# Patient Record
Sex: FEMALE | Race: WHITE | NOT HISPANIC OR LATINO | Employment: UNEMPLOYED | URBAN - METROPOLITAN AREA
[De-identification: names, ages, dates, MRNs, and addresses within clinical notes are randomized per-mention and may not be internally consistent; named-entity substitution may affect disease eponyms.]

---

## 2017-02-10 ENCOUNTER — HOSPITAL ENCOUNTER (EMERGENCY)
Facility: OTHER | Age: 57
Discharge: HOME | End: 2017-02-10
Attending: EMERGENCY MEDICINE

## 2017-02-10 VITALS
RESPIRATION RATE: 18 BRPM | HEART RATE: 70 BPM | DIASTOLIC BLOOD PRESSURE: 80 MMHG | OXYGEN SATURATION: 100 % | WEIGHT: 190 LBS | TEMPERATURE: 98 F | BODY MASS INDEX: 30.67 KG/M2 | SYSTOLIC BLOOD PRESSURE: 130 MMHG

## 2017-02-10 DIAGNOSIS — J32.1 FRONTAL SINUSITIS, UNSPECIFIED CHRONICITY: ICD-10-CM

## 2017-02-10 DIAGNOSIS — J01.00 ACUTE NON-RECURRENT MAXILLARY SINUSITIS: ICD-10-CM

## 2017-02-10 DIAGNOSIS — J06.9 VIRAL UPPER RESPIRATORY TRACT INFECTION: Primary | ICD-10-CM

## 2017-02-10 PROCEDURE — 96372 THER/PROPH/DIAG INJ SC/IM: CPT

## 2017-02-10 PROCEDURE — 63600175 PHARM REV CODE 636 W HCPCS: Performed by: EMERGENCY MEDICINE

## 2017-02-10 PROCEDURE — 99283 EMERGENCY DEPT VISIT LOW MDM: CPT

## 2017-02-10 RX ORDER — PROMETHAZINE HYDROCHLORIDE AND CODEINE PHOSPHATE 6.25; 1 MG/5ML; MG/5ML
5 SOLUTION ORAL EVERY 4 HOURS PRN
Qty: 118 ML | Refills: 0 | Status: SHIPPED | OUTPATIENT
Start: 2017-02-10 | End: 2017-02-10

## 2017-02-10 RX ORDER — AZITHROMYCIN 250 MG/1
TABLET, FILM COATED ORAL
Qty: 6 TABLET | Refills: 0 | Status: SHIPPED | OUTPATIENT
Start: 2017-02-10 | End: 2018-07-20

## 2017-02-10 RX ORDER — PROMETHAZINE HYDROCHLORIDE AND CODEINE PHOSPHATE 6.25; 1 MG/5ML; MG/5ML
5 SOLUTION ORAL EVERY 4 HOURS PRN
Qty: 118 ML | Refills: 0 | Status: SHIPPED | OUTPATIENT
Start: 2017-02-10 | End: 2017-02-20

## 2017-02-10 RX ORDER — DEXAMETHASONE SODIUM PHOSPHATE 4 MG/ML
12 INJECTION, SOLUTION INTRA-ARTICULAR; INTRALESIONAL; INTRAMUSCULAR; INTRAVENOUS; SOFT TISSUE
Status: COMPLETED | OUTPATIENT
Start: 2017-02-10 | End: 2017-02-10

## 2017-02-10 RX ADMIN — DEXAMETHASONE SODIUM PHOSPHATE 12 MG: 4 INJECTION, SOLUTION INTRAMUSCULAR; INTRAVENOUS at 03:02

## 2017-02-10 NOTE — DISCHARGE INSTRUCTIONS
Viral Upper Respiratory Illness (Adult)  You have a viral upper respiratory illness (URI), which is another term for the common cold. This illness is contagious during the first few days. It is spread through the air by coughing and sneezing. It may also be spread by direct contact (touching the sick person and then touching your own eyes, nose, or mouth). Frequent handwashing will decrease risk of spread. Most viral illnesses go away within 7 to 10 days with rest and simple home remedies. Sometimes the illness may last for several weeks. Antibiotics will not kill a virus, and they are generally not prescribed for this condition.    Home care  · If symptoms are severe, rest at home for the first 2 to 3 days. When you resume activity, don't let yourself get too tired.  · Avoid being exposed to cigarette smoke (yours or others).  · You may use acetaminophen or ibuprofen to control pain and fever, unless another medicine was prescribed. (Note: If you have chronic liver or kidney disease, have ever had a stomach ulcer or gastrointestinal bleeding, or are taking blood-thinning medicines, talk with your healthcare provider before using these medicines.) Aspirin should never be given to anyone under 18 years of age who is ill with a viral infection or fever. It may cause severe liver or brain damage.  · Your appetite may be poor, so a light diet is fine. Avoid dehydration by drinking 6 to 8 glasses of fluids per day (water, soft drinks, juices, tea, or soup). Extra fluids will help loosen secretions in the nose and lungs.  · Over-the-counter cold medicines will not shorten the length of time youre sick, but they may be helpful for the following symptoms: cough, sore throat, and nasal and sinus congestion. (Note: Do not use decongestants if you have high blood pressure.)  Follow-up care  Follow up with your healthcare provider, or as advised.  When to seek medical advice  Call your healthcare provider right away if  any of these occur:  · Cough with lots of colored sputum (mucus)  · Severe headache; face, neck, or ear pain  · Difficulty swallowing due to throat pain  · Fever of 100.4°F (38°C)  Call 911, or get immediate medical care  Call emergency services right away if any of these occur:  · Chest pain, shortness of breath, wheezing, or difficulty breathing  · Coughing up blood  · Inability to swallow due to throat pain  Date Last Reviewed: 9/13/2015 © 2000-2016 [employer]. [address] [place] [address]. All rights reserved. This information is not intended as a substitute for professional medical care. Always follow your healthcare professional's instructions.

## 2017-02-10 NOTE — ED AVS SNAPSHOT
[facility] EMERGENCY DEPARTMENT  [address] [place] [address]               Alyce CATES Book   2/10/2017 11:02 AM   ED    Description:  Female : 1960   Department:  [facility] Emergency Department           Your Care was Coordinated By:     Provider Role From To    Haleigh Brower MD Attending Provider 02/10/17 6667 --      Reason for Visit     URI           Diagnoses this Visit        Comments    Viral upper respiratory tract infection    -  Primary     Acute non-recurrent maxillary sinusitis         Frontal sinusitis, unspecified chronicity           ED Disposition     ED Disposition Condition Comment    Discharge             To Do List           Follow-up Information     Follow up with Primary Doctor No In 1 week(s).       These Medications        Disp Refills Start End    promethazine-codeine 6.25-10 mg/5 ml (PHENERGAN WITH CODEINE) 6.25-10 mg/5 mL syrup 118 mL 0 2/10/2017 2017    Take 5 mLs by mouth every 4 (four) hours as needed for Cough. - Oral    azithromycin (ZITHROMAX Z-LATONYA) 250 MG tablet 6 tablet 0 2/10/2017     2 tablets by mouth on day 1, then 1 tablet daily until gone.      [facility] On Call     [facility] On Call Nurse Care Line -  Assistance  Registered nurses in the [facility] On Call Center provide clinical advisement, health education, appointment booking, and other advisory services.  Call for this free service at [phone].             Medications           Message regarding Medications     Verify the changes and/or additions to your medication regime listed below are the same as discussed with your clinician today.  If any of these changes or additions are incorrect, please notify your healthcare provider.        START taking these NEW medications        Refills    promethazine-codeine 6.25-10 mg/5 ml (PHENERGAN WITH CODEINE) 6.25-10 mg/5 mL syrup 0    Sig: Take 5 mLs by mouth every 4 (four) hours as needed for Cough.    Class: Print    Route: Oral     azithromycin (ZITHROMAX Z-LATONYA) 250 MG tablet 0    Si tablets by mouth on day 1, then 1 tablet daily until gone.    Class: Print      These medications were administered today        Dose Freq    dexamethasone injection 12 mg 12 mg ED 1 Time    Sig: Inject 3 mLs (12 mg total) into the muscle ED 1 Time.    Class: Normal    Route: Intramuscular      STOP taking these medications     hydrocodone-acetaminophen 5-325mg (NORCO) 5-325 mg per tablet Take 1 tablet by mouth 4 (four) times daily as needed for Pain.           Verify that the below list of medications is an accurate representation of the medications you are currently taking.  If none reported, the list may be blank. If incorrect, please contact your healthcare provider. Carry this list with you in case of emergency.           Current Medications     azithromycin (ZITHROMAX Z-LATONYA) 250 MG tablet 2 tablets by mouth on day 1, then 1 tablet daily until gone.    gabapentin (NEURONTIN) 300 MG capsule Take 300 mg by mouth 3 (three) times daily.    ibuprofen (ADVIL,MOTRIN) 800 MG tablet Take 1 tablet (800 mg total) by mouth 3 (three) times daily as needed for Pain.    promethazine-codeine 6.25-10 mg/5 ml (PHENERGAN WITH CODEINE) 6.25-10 mg/5 mL syrup Take 5 mLs by mouth every 4 (four) hours as needed for Cough.           Clinical Reference Information           Your Vitals Were     BP Pulse Temp Resp Weight SpO2    122/87 111 98.1 °F (36.7 °C) (Temporal) 20 86.2 kg (190 lb) 97%    BMI                30.67 kg/m2          Allergies as of 2/10/2017     No Known Allergies      Immunizations Administered on Date of Encounter - 2/10/2017     None      ED Micro, Lab, POCT     None      ED Imaging Orders     None        Discharge Instructions           Viral Upper Respiratory Illness (Adult)  You have a viral upper respiratory illness (URI), which is another term for the common cold. This illness is contagious during the first few days. It is spread through the air by  coughing and sneezing. It may also be spread by direct contact (touching the sick person and then touching your own eyes, nose, or mouth). Frequent handwashing will decrease risk of spread. Most viral illnesses go away within 7 to 10 days with rest and simple home remedies. Sometimes the illness may last for several weeks. Antibiotics will not kill a virus, and they are generally not prescribed for this condition.    Home care  · If symptoms are severe, rest at home for the first 2 to 3 days. When you resume activity, don't let yourself get too tired.  · Avoid being exposed to cigarette smoke (yours or others).  · You may use acetaminophen or ibuprofen to control pain and fever, unless another medicine was prescribed. (Note: If you have chronic liver or kidney disease, have ever had a stomach ulcer or gastrointestinal bleeding, or are taking blood-thinning medicines, talk with your healthcare provider before using these medicines.) Aspirin should never be given to anyone under 18 years of age who is ill with a viral infection or fever. It may cause severe liver or brain damage.  · Your appetite may be poor, so a light diet is fine. Avoid dehydration by drinking 6 to 8 glasses of fluids per day (water, soft drinks, juices, tea, or soup). Extra fluids will help loosen secretions in the nose and lungs.  · Over-the-counter cold medicines will not shorten the length of time youre sick, but they may be helpful for the following symptoms: cough, sore throat, and nasal and sinus congestion. (Note: Do not use decongestants if you have high blood pressure.)  Follow-up care  Follow up with your healthcare provider, or as advised.  When to seek medical advice  Call your healthcare provider right away if any of these occur:  · Cough with lots of colored sputum (mucus)  · Severe headache; face, neck, or ear pain  · Difficulty swallowing due to throat pain  · Fever of 100.4°F (38°C)  Call 911, or get immediate medical care  Call  emergency services right away if any of these occur:  · Chest pain, shortness of breath, wheezing, or difficulty breathing  · Coughing up blood  · Inability to swallow due to throat pain  Date Last Reviewed: 9/13/2015  © 6999-9400 [employer]. [address] [place] [address], [place] [address]. All rights reserved. This information is not intended as a substitute for professional medical care. Always follow your healthcare professional's instructions.          [facility] Sign-Up     Activating your [facility] account is as easy as 1-2-3!     1) Visit [email or web address], select Sign Up Now, enter this activation code and your date of birth, then select Next.  [email or web address]  Expires: 3/27/2017  3:37 PM      2) Create a username and password to use when you visit [facility] in the future and select a security question in case you lose your password and select Next.    3) Enter your e-mail address and click Sign Up!    Additional Information  If you have questions, please e-mail [email or web address] ([email or web address]) or call [phone] to talk to our [facility] staff. Remember, [facility] is NOT to be used for urgent needs. For medical emergencies, dial 911.          [facility] Emergency Department complies with applicable Federal civil rights laws and does not discriminate on the basis of race, color, national origin, age, disability, or sex.        Language Assistance Services     ATTENTION: Language assistance services are available, free of charge. Please call [phone].      ATENCIÓN: Si habla español, tiene a gray disposición servicios gratuitos de asistencia lingüística. Llame al [phone].     CHÚ Ý: N?u b?n nói Ti?ng Vi?t, có các d?ch v? h? tr? ngôn ng? mi?n phí dành cho b?n. G?i s? [phone].

## 2017-02-10 NOTE — ED NOTES
Appearance: Pt awake, alert & oriented to person, place & time. Pt in no acute distress at present time.  Skin: Skin warm, dry & intact. Mucous membranes moist. Skin turgor normal.  Respiratory: Respirations even, non-labored. + dry coughing, expiratory wheezing noted to the left side   Neurologic: Pt moving all extremities without difficulty. Sensation intact.   Peripheral Vascular: All peripheral pulses present.

## 2017-02-10 NOTE — ED TRIAGE NOTES
Pt reports had heart cath where an ablation was done, at [facility] last week ago, pt reports has been having sore throat, cough, dry, and chest congestion since Friday, + loss of voice noted.

## 2017-02-11 NOTE — ED PROVIDER NOTES
Encounter Date: 2/10/2017       History     Chief Complaint   Patient presents with    URI     sore throat cough      Review of patient's allergies indicates:  No Known Allergies  HPI Comments: 56-year-old female reports runny nose, cough productive of sputum, sore throat, hoarseness of voice, does nasal drip, and sinus pressure for greater than 1 week.  Denies fever    The history is provided by the patient.     Past Medical History   Diagnosis Date    Hyperlipemia      No past medical history pertinent negatives.  Past Surgical History   Procedure Laterality Date    Hysterectomy      Appendectomy       History reviewed. No pertinent family history.  Social History   Substance Use Topics    Smoking status: Never Smoker    Smokeless tobacco: None    Alcohol use No     Review of Systems   Constitutional: Negative for chills and fever.   HENT: Positive for congestion, postnasal drip, rhinorrhea, sinus pressure and sore throat. Negative for trouble swallowing.    Respiratory: Positive for cough. Negative for shortness of breath.    Cardiovascular: Positive for chest pain (with cough). Negative for palpitations.   Gastrointestinal: Positive for nausea and vomiting (posttussive). Negative for abdominal pain.   Skin: Negative for color change and wound.       Physical Exam   Initial Vitals   BP Pulse Resp Temp SpO2   02/10/17 1104 02/10/17 1104 02/10/17 1104 02/10/17 1104 02/10/17 1104   125/73 96 18 98.1 °F (36.7 °C) 98 %     Physical Exam    Nursing note and vitals reviewed.  Constitutional: Vital signs are normal. She appears well-developed and well-nourished. She is cooperative.   HENT:   Head: Normocephalic and atraumatic.   Nose: Mucosal edema and rhinorrhea present.   Mouth/Throat: Uvula is midline and oropharynx is clear and moist.   Neck:   moderate hoarseness of voice noted    Cardiovascular: Normal rate, regular rhythm and normal heart sounds.   Pulses:       Radial pulses are 2+ on the right side, and  2+ on the left side.   Pulmonary/Chest: Effort normal and breath sounds normal. She has no wheezes.   Neurological: She is alert and oriented to person, place, and time. Gait normal.   Skin: Skin is warm and dry.         ED Course   Procedures  Labs Reviewed - No data to display         Patient was given Decadron 12 mg IM in the ED.                   ED Course     Clinical Impression:   The primary encounter diagnosis was Viral upper respiratory tract infection. Diagnoses of Acute non-recurrent maxillary sinusitis and Frontal sinusitis, unspecified chronicity were also pertinent to this visit.    Disposition:   Disposition: Discharged  Condition: Stable       Haleigh Brower MD  02/10/17 1956

## 2017-06-14 LAB
CHOL/HDLC RATIO: 4.2
CHOLESTEROL, TOTAL: 184
HDLC SERPL-MCNC: 44 MG/DL
LDLC SERPL CALC-MCNC: 116 MG/DL
TRIGL SERPL-MCNC: 120 MG/DL

## 2018-01-10 LAB — HIV: NEGATIVE

## 2018-07-20 ENCOUNTER — OFFICE VISIT (OUTPATIENT)
Dept: FAMILY MEDICINE | Facility: CLINIC | Age: 58
End: 2018-07-20
Payer: COMMERCIAL

## 2018-07-20 ENCOUNTER — TELEPHONE (OUTPATIENT)
Dept: FAMILY MEDICINE | Facility: CLINIC | Age: 58
End: 2018-07-20

## 2018-07-20 VITALS
BODY MASS INDEX: 31.82 KG/M2 | TEMPERATURE: 99 F | OXYGEN SATURATION: 96 % | HEIGHT: 66 IN | HEART RATE: 100 BPM | DIASTOLIC BLOOD PRESSURE: 80 MMHG | RESPIRATION RATE: 16 BRPM | WEIGHT: 198 LBS | SYSTOLIC BLOOD PRESSURE: 136 MMHG

## 2018-07-20 DIAGNOSIS — F41.9 ANXIETY: ICD-10-CM

## 2018-07-20 DIAGNOSIS — M25.562 ACUTE PAIN OF LEFT KNEE: Primary | ICD-10-CM

## 2018-07-20 DIAGNOSIS — I10 HYPERTENSION, WELL CONTROLLED: ICD-10-CM

## 2018-07-20 DIAGNOSIS — G56.02 LEFT CARPAL TUNNEL SYNDROME: ICD-10-CM

## 2018-07-20 PROCEDURE — 99999 PR PBB SHADOW E&M-EST. PATIENT-LVL III: CPT | Mod: PBBFAC,,, | Performed by: FAMILY MEDICINE

## 2018-07-20 PROCEDURE — 99204 OFFICE O/P NEW MOD 45 MIN: CPT | Mod: S$GLB,,, | Performed by: FAMILY MEDICINE

## 2018-07-20 PROCEDURE — 3008F BODY MASS INDEX DOCD: CPT | Mod: CPTII,S$GLB,, | Performed by: FAMILY MEDICINE

## 2018-07-20 RX ORDER — FLUOXETINE HYDROCHLORIDE 20 MG/1
20 CAPSULE ORAL EVERY MORNING
Qty: 30 CAPSULE | Refills: 2 | Status: SHIPPED | OUTPATIENT
Start: 2018-07-20 | End: 2018-08-20 | Stop reason: SDUPTHER

## 2018-07-20 RX ORDER — HYDROCHLOROTHIAZIDE 12.5 MG/1
12.5 TABLET ORAL DAILY
COMMUNITY
End: 2018-08-17 | Stop reason: SDUPTHER

## 2018-07-20 RX ORDER — GLUCOSAMINE/CHONDRO SU A 500-400 MG
1 TABLET ORAL DAILY
COMMUNITY
End: 2019-02-04

## 2018-07-20 RX ORDER — METHOCARBAMOL 500 MG/1
500 TABLET, FILM COATED ORAL
COMMUNITY
End: 2018-08-17 | Stop reason: SDUPTHER

## 2018-07-20 RX ORDER — FLUTICASONE PROPIONATE 50 MCG
2 SPRAY, SUSPENSION (ML) NASAL DAILY
COMMUNITY

## 2018-07-20 RX ORDER — MELOXICAM 15 MG/1
15 TABLET ORAL DAILY
Qty: 30 TABLET | Refills: 0 | Status: SHIPPED | OUTPATIENT
Start: 2018-07-20 | End: 2018-08-20 | Stop reason: SDUPTHER

## 2018-07-20 RX ORDER — AMITRIPTYLINE HYDROCHLORIDE 50 MG/1
50 TABLET, FILM COATED ORAL
COMMUNITY
End: 2018-08-17 | Stop reason: SDUPTHER

## 2018-07-20 RX ORDER — METOPROLOL TARTRATE 25 MG/1
25 TABLET, FILM COATED ORAL
COMMUNITY
End: 2018-11-20 | Stop reason: SDUPTHER

## 2018-07-20 NOTE — TELEPHONE ENCOUNTER
Pt seen today, needs return to work note, she is off on weekends, return on Monday; informed pt letter will be at

## 2018-07-20 NOTE — TELEPHONE ENCOUNTER
----- Message from Latasha Mix sent at 7/20/2018  2:57 PM CDT -----  Contact: self  Patient had an appointment today at 2:00 pm today and would like to get a doctors excuse. She said she can come in and pick it up later today. Please call back at [phone]

## 2018-07-20 NOTE — PROGRESS NOTES
Subjective:       Patient ID: Alyce Zurita is a 58 y.o. female.    Chief Complaint: Fluid Retention (follow up hctz est care )    HPI    Pt is here today to establish care    SVT s/p ablation ~ 2 years ago. Pt is sxs free since then, but her HR remains around 100-120bpm.    Htn - Chronic - stable on current medications.     AR - chronic - on flonase works well.    Sleep disturbance - a/w anxiety - pt has had chronic anxiety  That has been ongoing for years, and never had this evaluted.     R knee pain - Onset 1 month ago of tripping and falling over her small dog and landing on her L     L carpal tunnel - a/w tingling, pain and numbness x 1 year that is intermittent. A/w weakness. L hand dominant.     Anxiety - pt has chronic anxiety for years, which has never been assessed. She is interested in seeking out medication    She has tried CBT many years ago which was unsuccessful.        Outpatient Prescriptions Marked as Taking for the 7/20/18 encounter (Office Visit) with Bryson Davila MD   Medication Sig Dispense Refill    amitriptyline (ELAVIL) 50 MG tablet Take 50 mg by mouth. Take 3 tablets nightly      cholecalciferol, vitamin D3, (VITAMIN D3 ORAL) Take by mouth 2 (two) times daily.      esomeprazole magnesium (NEXIUM ORAL) Take by mouth once daily.      fluticasone (FLONASE) 50 mcg/actuation nasal spray 2 sprays by Each Nare route once daily.      gabapentin (NEURONTIN) 300 MG capsule Take 300 mg by mouth. Take 2 tablet in the morning and 2 tablet nightly      glucosamine-chondroitin 500-400 mg tablet Take 1 tablet by mouth once daily.      HYDROCHLOROTHIAZIDE ORAL Take 12.5 mg by mouth once daily.      methocarbamol (ROBAXIN) 500 MG Tab Take 500 mg by mouth. Take 3 tablet nightly      metoprolol tartrate (LOPRESSOR) 25 MG tablet Take 25 mg by mouth. Take 1/2 tablet daily      POTASSIUM ORAL Take 20 mg by mouth as needed.      [DISCONTINUED] ibuprofen (ADVIL,MOTRIN) 800 MG tablet Take 1 tablet  (800 mg total) by mouth 3 (three) times daily as needed for Pain. (Patient taking differently: Take 800 mg by mouth as needed for Pain. ) 20 tablet 0       Past Medical History:   Diagnosis Date    Allergy     Arthritis     GERD (gastroesophageal reflux disease)     Hyperlipemia     Kidney problem     Meningitis     3 months old    Tachycardia        Family History   Problem Relation Age of Onset    Arthritis Mother     Glaucoma Mother     Asthma Daughter         reports that she has never smoked. She has never used smokeless tobacco. She reports that she does not drink alcohol.    Review of Systems   Constitutional: Negative for chills and fever.   HENT: Negative for congestion, ear pain, hearing loss, rhinorrhea and sore throat.    Eyes: Negative for pain and discharge.   Respiratory: Negative for cough and shortness of breath.    Cardiovascular: Negative for chest pain and palpitations.   Gastrointestinal: Negative for diarrhea, nausea and vomiting.   Genitourinary: Negative for difficulty urinating, dysuria and frequency.   Musculoskeletal: Positive for arthralgias and joint swelling.   Allergic/Immunologic: Negative for environmental allergies and food allergies.   Neurological: Positive for weakness and numbness. Negative for seizures.   Psychiatric/Behavioral: Negative for dysphoric mood, self-injury and suicidal ideas. The patient is nervous/anxious.        Objective:     Vitals:    07/20/18 1345   BP: 136/80   Pulse: 100   Resp: 16   Temp: 98.6 °F (37 °C)        Physical Exam   Constitutional: She appears well-developed. No distress.   HENT:   Head: Normocephalic and atraumatic.   Eyes: Conjunctivae are normal. No scleral icterus.   Pulmonary/Chest: Effort normal.   Musculoskeletal:        Right knee: She exhibits normal range of motion, no swelling, no effusion, no ecchymosis and no deformity.        Left knee: She exhibits decreased range of motion, swelling and effusion. She exhibits no  ecchymosis, no deformity and no laceration. Tenderness found.   + Phalen's   Neurological: She is alert.   Skin: She is not diaphoretic.   Psychiatric: Her behavior is normal. Thought content normal. Her mood appears anxious. Her speech is rapid and/or pressured. Cognition and memory are normal.   Vitals reviewed.      Assessment:       1. Acute pain of left knee    2. Left carpal tunnel syndrome    3. Hypertension, well controlled    4. Anxiety        Plan:       Alyce was seen today for fluid retention.    Diagnoses and all orders for this visit:    Acute pain of left knee  -     meloxicam (MOBIC) 15 MG tablet; Take 1 tablet (15 mg total) by mouth once daily. For knee pain  With effusion. No infection suspected. Will try meloxicam. Will inject drain if not improved by time we f//u.    Left carpal tunnel syndrome  -     Nerve conduction test; Future  Pt needs official eval. Will order NCS.     Hypertension, well controlled  - Chronic - stable     Pt is doing well on current therapy and is requesting a refill. No side effects noted. Will continue current therapy.         Anxiety  -     FLUoxetine (PROZAC) 20 MG capsule; Take 1 capsule (20 mg total) by mouth every morning.  Pt counseled on potential avenues to treat their anxiety/depression including medication, CBT, and exercise.    Pt opted for trial of medications. I explained potential side effects including worsening mood or SI, and instructed the patient to stop the medication immediately and to contact our office if these sxs occur.    -  I also explained that SSRIs/SNRIs that are used to treat anxiety/depression, take up to 8 weeks for full efficacy and I encouraged compliance through this period.     - I also advised patient that once medication was started, that it should not be stopped abruptly. Pt asked to notify me if they want to stop themedication for any reason so that I can explain how to wean off of the medication safely.      Pt has declined a  trial of CBT.            Follow-up in about 4 weeks (around 8/17/2018) for anxiety, R knee pain with effusion.      Pt verbalized understanding and agreed with our plan.

## 2018-07-26 DIAGNOSIS — Z12.39 BREAST CANCER SCREENING: ICD-10-CM

## 2018-08-02 ENCOUNTER — TELEPHONE (OUTPATIENT)
Dept: ADMINISTRATIVE | Facility: HOSPITAL | Age: 58
End: 2018-08-02

## 2018-08-04 ENCOUNTER — PROCEDURE VISIT (OUTPATIENT)
Dept: NEUROLOGY | Facility: CLINIC | Age: 58
End: 2018-08-04
Payer: COMMERCIAL

## 2018-08-04 VITALS — TEMPERATURE: 95 F | BODY MASS INDEX: 31.66 KG/M2 | HEIGHT: 66 IN | WEIGHT: 197 LBS

## 2018-08-04 DIAGNOSIS — G56.02 LEFT CARPAL TUNNEL SYNDROME: ICD-10-CM

## 2018-08-04 PROCEDURE — 99205 OFFICE O/P NEW HI 60 MIN: CPT | Mod: 25,S$GLB,, | Performed by: NEUROLOGICAL SURGERY

## 2018-08-04 PROCEDURE — 95911 NRV CNDJ TEST 9-10 STUDIES: CPT | Mod: S$GLB,,, | Performed by: NEUROLOGICAL SURGERY

## 2018-08-04 PROCEDURE — 95886 MUSC TEST DONE W/N TEST COMP: CPT | Mod: S$GLB,,, | Performed by: NEUROLOGICAL SURGERY

## 2018-08-04 NOTE — PROCEDURES
Chief Complaint   Patient presents with    Other Misc     EMG        Carpal Tunnel Syndrome  Patient presents for evaluation of pain in hands, hand paresthesias and possible carpal tunnel syndrome. Onset of the symptoms was several years ago. Current symptoms include: pain involving the fingers, wrist, and shoulder, tingling/numbness involving the fingers and weakness involving the left thumb. Aggravating factors: repetitive activity: using hand tools and painting, work related repetitive activity: painting, worse at night or first thing in the morning and worse with activity. Symptoms have gradually worsened. Evaluation to date: none. Treatment to date: OTC analgesics, which has been not very effective and wrist splints used for several months, which has been not very effective.  She presents to clinic today for EMG/nerve conduction studies to begin evaluation of her symptoms.    PAST MEDICAL HISTORY:  Past Medical History:   Diagnosis Date    Allergy     Arthritis     GERD (gastroesophageal reflux disease)     Hyperlipemia     Kidney problem     Meningitis     3 months old    Tachycardia        PAST SURGICAL HISTORY:  Past Surgical History:   Procedure Laterality Date    APPENDECTOMY      Heart Procedure       HYSTERECTOMY      KIDNEY SURGERY         SOCIAL HISTORY:  Social History     Social History    Marital status:      Spouse name: N/A    Number of children: N/A    Years of education: N/A     Occupational History    Not on file.     Social History Main Topics    Smoking status: Never Smoker    Smokeless tobacco: Never Used    Alcohol use No    Drug use: Unknown    Sexual activity: Yes     Partners: Male     Birth control/ protection: None      Comment: 7/20/18  with same partner for 40 years      Other Topics Concern    Not on file     Social History Narrative    No narrative on file       FAMILY HISTORY:  Family History   Problem Relation Age of Onset    Arthritis Mother      Glaucoma Mother     Asthma Daughter        ALLERGIES AND MEDICATIONS: updated and reviewed.  Review of patient's allergies indicates:  No Known Allergies  Current Outpatient Prescriptions   Medication Sig Dispense Refill    amitriptyline (ELAVIL) 50 MG tablet Take 50 mg by mouth. Take 3 tablets nightly      cholecalciferol, vitamin D3, (VITAMIN D3 ORAL) Take by mouth 2 (two) times daily.      esomeprazole magnesium (NEXIUM ORAL) Take by mouth once daily.      FLUoxetine (PROZAC) 20 MG capsule Take 1 capsule (20 mg total) by mouth every morning. 30 capsule 2    fluticasone (FLONASE) 50 mcg/actuation nasal spray 2 sprays by Each Nare route once daily.      gabapentin (NEURONTIN) 300 MG capsule Take 300 mg by mouth. Take 2 tablet in the morning and 2 tablet nightly      glucosamine-chondroitin 500-400 mg tablet Take 1 tablet by mouth once daily.      HYDROCHLOROTHIAZIDE ORAL Take 12.5 mg by mouth once daily.      meloxicam (MOBIC) 15 MG tablet Take 1 tablet (15 mg total) by mouth once daily. For knee pain 30 tablet 0    methocarbamol (ROBAXIN) 500 MG Tab Take 500 mg by mouth. Take 3 tablet nightly      metoprolol tartrate (LOPRESSOR) 25 MG tablet Take 25 mg by mouth. Take 1/2 tablet daily      POTASSIUM ORAL Take 20 mg by mouth as needed.       No current facility-administered medications for this visit.        Review of Systems   Constitutional: Negative for activity change, appetite change, fever and unexpected weight change.   HENT: Negative for trouble swallowing and voice change.    Eyes: Negative for photophobia and visual disturbance.   Respiratory: Negative for apnea and shortness of breath.    Cardiovascular: Negative for chest pain and leg swelling.   Gastrointestinal: Negative for constipation and nausea.   Genitourinary: Negative for difficulty urinating.   Musculoskeletal: Positive for arthralgias. Negative for back pain, gait problem and neck pain.   Skin: Negative for color change and  pallor.   Neurological: Positive for weakness and numbness. Negative for dizziness, seizures and syncope.   Hematological: Negative for adenopathy.   Psychiatric/Behavioral: Negative for agitation, confusion and decreased concentration.       Neurologic Exam     Mental Status   Oriented to person, place, and time.   Registration: recalls 3 of 3 objects.   Attention: normal. Concentration: normal.   Speech: speech is normal   Level of consciousness: alert  Knowledge: good.     Cranial Nerves     CN II   Visual fields full to confrontation.   Right visual field deficit: none  Left visual field deficit: none     CN III, IV, VI   Pupils are equal, round, and reactive to light.  Extraocular motions are normal.   Right pupil: Size: 3 mm. Shape: regular. Accommodation: intact.   Left pupil: Size: 3 mm. Shape: regular. Accommodation: intact.   CN III: no CN III palsy  CN VI: no CN VI palsy  Nystagmus: none   Diplopia: none  Ophthalmoparesis: none  Upgaze: normal  Downgaze: normal  Conjugate gaze: present    CN V   Facial sensation intact.   Right facial sensation deficit: none  Left facial sensation deficit: none    CN VII   Facial expression full, symmetric.   Right facial weakness: none  Left facial weakness: none    CN VIII   CN VIII normal.     CN IX, X   CN IX normal.   CN X normal.   Palate: symmetric    CN XI   CN XI normal.   Right sternocleidomastoid strength: normal  Left sternocleidomastoid strength: normal  Right trapezius strength: normal  Left trapezius strength: normal    CN XII   CN XII normal.   Tongue deviation: none    Motor Exam   Muscle bulk: normal  Overall muscle tone: normal  Right arm tone: normal  Left arm tone: normal  Right leg tone: normal  Left leg tone: normal    Strength   Strength 5/5 except as noted.   Right interossei: 4/5  Left interossei: 4/5    Sensory Exam   Right arm light touch: decreased from fingers  Left arm light touch: decreased from fingers  Right leg light touch: normal  Left  leg light touch: normal  Right arm vibration: decreased from fingers  Left arm vibration: decreased from fingers  Right leg vibration: normal  Left leg vibration: normal  Right arm proprioception: normal  Left arm proprioception: normal  Right leg proprioception: normal  Left leg proprioception: normal  Right arm pinprick: decreased from fingers  Left arm pinprick: decreased from fingers  Right leg pinprick: normal  Left leg pinprick: normal  Sensory deficit distribution on right: median  Sensory deficit distribution on left: median    Gait, Coordination, and Reflexes     Gait  Gait: normal    Coordination   Romberg: negative  Finger to nose coordination: normal  Heel to shin coordination: normal  Tandem walking coordination: normal    Tremor   Resting tremor: absent    Reflexes   Right brachioradialis: 2+  Left brachioradialis: 2+  Right biceps: 2+  Left biceps: 2+  Right triceps: 2+  Left triceps: 2+  Right patellar: 2+  Left patellar: 2+  Right achilles: 2+  Left achilles: 2+  Right plantar: normal  Left plantar: normal      Physical Exam   Constitutional: She is oriented to person, place, and time. She appears well-developed and well-nourished.   HENT:   Head: Normocephalic and atraumatic.   Eyes: EOM are normal. Pupils are equal, round, and reactive to light.   Neck: Normal range of motion.   Cardiovascular: Normal rate and intact distal pulses.    Pulmonary/Chest: Effort normal. No apnea. No respiratory distress.   Musculoskeletal: Normal range of motion.   Neurological: She is alert and oriented to person, place, and time. She has a normal Finger-Nose-Finger Test, a normal Heel to Shin Test, a normal Romberg Test and a normal Tandem Gait Test. Gait normal.   Reflex Scores:       Tricep reflexes are 2+ on the right side and 2+ on the left side.       Bicep reflexes are 2+ on the right side and 2+ on the left side.       Brachioradialis reflexes are 2+ on the right side and 2+ on the left side.       Patellar  "reflexes are 2+ on the right side and 2+ on the left side.       Achilles reflexes are 2+ on the right side and 2+ on the left side.  Skin: Skin is warm and dry.   Psychiatric: She has a normal mood and affect. Her speech is normal and behavior is normal. Thought content normal.   Vitals reviewed.      Vitals:    08/04/18 0843   Temp: (!) 95.4 °F (35.2 °C)   Weight: 89.4 kg (197 lb)   Height: 5' 6" (1.676 m)       Assessment & Plan:    Problem List Items Addressed This Visit     None      Visit Diagnoses     Left carpal tunnel syndrome              Follow-up: Follow-up if symptoms worsen or fail to improve.  "

## 2018-08-17 ENCOUNTER — OFFICE VISIT (OUTPATIENT)
Dept: FAMILY MEDICINE | Facility: CLINIC | Age: 58
End: 2018-08-17
Payer: COMMERCIAL

## 2018-08-17 ENCOUNTER — TELEPHONE (OUTPATIENT)
Dept: FAMILY MEDICINE | Facility: CLINIC | Age: 58
End: 2018-08-17

## 2018-08-17 VITALS
OXYGEN SATURATION: 97 % | HEIGHT: 66 IN | BODY MASS INDEX: 31.71 KG/M2 | DIASTOLIC BLOOD PRESSURE: 80 MMHG | WEIGHT: 197.31 LBS | TEMPERATURE: 99 F | RESPIRATION RATE: 16 BRPM | HEART RATE: 82 BPM | SYSTOLIC BLOOD PRESSURE: 130 MMHG

## 2018-08-17 DIAGNOSIS — E66.9 OBESITY (BMI 30.0-34.9): ICD-10-CM

## 2018-08-17 DIAGNOSIS — G89.29 CHRONIC NECK PAIN: ICD-10-CM

## 2018-08-17 DIAGNOSIS — S81.012A KNEE LACERATION, LEFT, INITIAL ENCOUNTER: Primary | ICD-10-CM

## 2018-08-17 DIAGNOSIS — M54.2 CHRONIC NECK PAIN: ICD-10-CM

## 2018-08-17 DIAGNOSIS — F51.01 PRIMARY INSOMNIA: ICD-10-CM

## 2018-08-17 DIAGNOSIS — I10 HYPERTENSION, UNCONTROLLED: ICD-10-CM

## 2018-08-17 PROCEDURE — 99999 PR PBB SHADOW E&M-EST. PATIENT-LVL V: CPT | Mod: PBBFAC,,, | Performed by: FAMILY MEDICINE

## 2018-08-17 PROCEDURE — 12032 INTMD RPR S/A/T/EXT 2.6-7.5: CPT | Mod: S$GLB,,, | Performed by: FAMILY MEDICINE

## 2018-08-17 PROCEDURE — 3008F BODY MASS INDEX DOCD: CPT | Mod: CPTII,S$GLB,, | Performed by: FAMILY MEDICINE

## 2018-08-17 PROCEDURE — 99214 OFFICE O/P EST MOD 30 MIN: CPT | Mod: 25,S$GLB,, | Performed by: FAMILY MEDICINE

## 2018-08-17 RX ORDER — AMITRIPTYLINE HYDROCHLORIDE 150 MG/1
150 TABLET ORAL NIGHTLY
Qty: 90 TABLET | Refills: 3 | Status: SHIPPED | OUTPATIENT
Start: 2018-08-17 | End: 2018-08-17 | Stop reason: SDUPTHER

## 2018-08-17 RX ORDER — METHOCARBAMOL 500 MG/1
500 TABLET, FILM COATED ORAL 3 TIMES DAILY
Qty: 90 TABLET | Refills: 0 | Status: SHIPPED | OUTPATIENT
Start: 2018-08-17 | End: 2018-08-17 | Stop reason: SDUPTHER

## 2018-08-17 RX ORDER — AMITRIPTYLINE HYDROCHLORIDE 150 MG/1
150 TABLET ORAL NIGHTLY PRN
Qty: 90 TABLET | Refills: 3 | Status: SHIPPED | OUTPATIENT
Start: 2018-08-17 | End: 2018-10-26 | Stop reason: SDUPTHER

## 2018-08-17 RX ORDER — HYDROCHLOROTHIAZIDE 12.5 MG/1
12.5 CAPSULE ORAL DAILY
Qty: 90 CAPSULE | Refills: 0 | Status: SHIPPED | OUTPATIENT
Start: 2018-08-17 | End: 2018-11-20 | Stop reason: SDUPTHER

## 2018-08-17 RX ORDER — METHOCARBAMOL 500 MG/1
500 TABLET, FILM COATED ORAL NIGHTLY PRN
Qty: 90 TABLET | Refills: 0 | Status: SHIPPED | OUTPATIENT
Start: 2018-08-17 | End: 2018-10-26

## 2018-08-17 RX ORDER — IBUPROFEN 600 MG/1
600 TABLET ORAL
COMMUNITY
End: 2019-04-08 | Stop reason: SDUPTHER

## 2018-08-17 NOTE — TELEPHONE ENCOUNTER
----- Message from Lizbeth Correa sent at 8/17/2018  8:33 AM CDT -----  Contact: Daniel with [facility]/ [phone]  Daniel calling to verify directions for RX: [methocarbamol (ROBAXIN) 500 MG Tab] Thank you.  .  [facility] - [place], [place] - [address] [place] [address]  Phone: [phone] Fax: [phone]

## 2018-08-17 NOTE — PROGRESS NOTES
Health maintenance-  Health Maintenance     Hepatitis C Screening Sign [facility] with [facility]        - received from [facility] stating mammogram 2017, due 2018  - received from [facility] stating DEXA 2015 with osteoporosis  -received from [facility] stating colonoscopy done hx tubular adenoma, benign rectal polyp and diverticulitis- repeat 2021

## 2018-08-17 NOTE — TELEPHONE ENCOUNTER
Called pharmacy with directions of the robaxin as requested below, pharmacy inquired on script for elavil as well. Pharmacy(Daniel) requesting to have scripts for the elavil and robaxin resent with clear instructions they will discontinue the other scripts due to unclear directions.

## 2018-08-17 NOTE — PATIENT INSTRUCTIONS
Suture Care    Stitches (sutures) are used to close wounds. Sutures also help stop bleeding and speed healing. To help your wound heal, follow the tips on this handout.  Some sutures need to be removed by a healthcare provider. Others dissolve on their own. Sometimes strips of tape are used. Youll be told what kind of sutures you have.   Keep sutures clean  · Avoid doing things that could cause dirt or sweat to get on your sutures. If needed, cover your sutures with a bandage (dressing) to protect them.  · Dont pick at scabs. They help protect the wound.  · Dont wash the area around your sutures unless your healthcare provider says its OK. Then, follow his or her instructions for washing and drying.  Keep sutures dry  · Keep your sutures out of water.  · Take a sponge bath to avoid getting your sutures wound wet, unless your healthcare provider tells you otherwise.  · Ask your provider when can you take a shower or bathe.  · Ask your provider about the best way to keep your sutures dry when bathing or showering.  · If sutures get damp, pat them dry.  Changing your dressing  Leave the dressing in place until you are told to remove it or change it. Change it only as directed, using clean hands:  · After the first ___hours, change your dressing every ___hours.  · Change your dressing if it gets wet or dirty.  Other tips  · To help wounds on an arm or leg heal, use the affected limb as little as possible.  · To help reduce swelling and throbbing, raise the area with sutures above your heart.  · To help prevent itching, cover sutures with gauze. If sutures itch, try not to scratch them.  · For pain relief, try acetaminophen or ibuprofen. Dont use aspirin. It can increase bleeding.  When to seek medical care  Call your healthcare provider if you notice any of the following signs:  · Increased soreness, pain, or tenderness after 24 hours  · A red streak, increased redness, or puffiness near the wound  · White,  yellowish, or bad smelling discharge from the wound  · Bleeding that cant be stopped by applying pressure  · Steri-Strips fall off or stitches dissolve before the wound heals  · Fever over 100.4°F (38.0°C)   Date Last Reviewed: 7/1/2016  © 6005-4134 [employer]. [address] [place] [address], [place] [address]. All rights reserved. This information is not intended as a substitute for professional medical care. Always follow your healthcare professional's instructions.        Suture Care    Stitches (sutures) are used to close wounds. Sutures also help stop bleeding and speed healing. To help your wound heal, follow the tips on this handout.  Some sutures need to be removed by a healthcare provider. Others dissolve on their own. Sometimes strips of tape are used. Youll be told what kind of sutures you have.   Keep sutures clean  · Avoid doing things that could cause dirt or sweat to get on your sutures. If needed, cover your sutures with a bandage (dressing) to protect them.  · Dont pick at scabs. They help protect the wound.  · Dont wash the area around your sutures unless your healthcare provider says its OK. Then, follow his or her instructions for washing and drying.  Keep sutures dry  · Keep your sutures out of water.  · Take a sponge bath to avoid getting your sutures wound wet, unless your healthcare provider tells you otherwise.  · Ask your provider when can you take a shower or bathe.  · Ask your provider about the best way to keep your sutures dry when bathing or showering.  · If sutures get damp, pat them dry.  Changing your dressing  Leave the dressing in place until you are told to remove it or change it. Change it only as directed, using clean hands:  · After the first ___hours, change your dressing every ___hours.  · Change your dressing if it gets wet or dirty.  Other tips  · To help wounds on an arm or leg heal, use the affected limb as little as possible.  · To help reduce swelling and  throbbing, raise the area with sutures above your heart.  · To help prevent itching, cover sutures with gauze. If sutures itch, try not to scratch them.  · For pain relief, try acetaminophen or ibuprofen. Dont use aspirin. It can increase bleeding.  When to seek medical care  Call your healthcare provider if you notice any of the following signs:  · Increased soreness, pain, or tenderness after 24 hours  · A red streak, increased redness, or puffiness near the wound  · White, yellowish, or bad smelling discharge from the wound  · Bleeding that cant be stopped by applying pressure  · Steri-Strips fall off or stitches dissolve before the wound heals  · Fever over 100.4°F (38.0°C)   Date Last Reviewed: 7/1/2016  © 9136-6329 [employer]. [address] [place] [address], [place] [address]. All rights reserved. This information is not intended as a substitute for professional medical care. Always follow your healthcare professional's instructions.

## 2018-08-17 NOTE — LETTER
August 17, 2018      [facility] [address]  [place] [address]  Phone: [phone]  Fax: [phone]       Patient: Alyce Zurita   YOB: 1960  Date of Visit: 08/17/2018    To Whom It May Concern:    Adwoa Zurita  was at [facility] on 08/17/2018. She may return to work on 8/20/2018 with no restrictions. If you have any questions or concerns, or if I can be of further assistance, please do not hesitate to contact me.    Sincerely,    .

## 2018-08-17 NOTE — PROGRESS NOTES
Subjective:       Patient ID: Alyce Zurita is a 58 y.o. female.    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  Patient states that she washed the wound out immediately and applied Neosporin to the area.  Today she denies any fluid drainage surrounding erythema fever or chills    Htn - pts bp elevated today. She attributes this to pain which is mild over her L knee. No gait problems or knee swelling. Pt is also out of her hctz.     Pt is also requesting a refill of her amitriptyline for her chronic insomnia which works very well for her without side effects.     Chronic neck pain - pt is also requesting refill of her robaxin which she takes prn for neck spasm which occur almost daily. No side effects.     Outpatient Medications Marked as Taking for the 8/17/18 encounter (Office Visit) with Bryson Davila MD   Medication Sig Dispense Refill    cholecalciferol, vitamin D3, (VITAMIN D3 ORAL) Take by mouth 2 (two) times daily.      esomeprazole magnesium (NEXIUM ORAL) Take by mouth once daily.      FLUoxetine (PROZAC) 20 MG capsule Take 1 capsule (20 mg total) by mouth every morning. 30 capsule 2    fluticasone (FLONASE) 50 mcg/actuation nasal spray 2 sprays by Each Nare route once daily.      gabapentin (NEURONTIN) 300 MG capsule Take 300 mg by mouth. Take 2 tablet in the morning and 2 tablet nightly      glucosamine-chondroitin 500-400 mg tablet Take 1 tablet by mouth once daily.      ibuprofen (ADVIL,MOTRIN) 600 MG tablet Take 600 mg by mouth as needed for Pain.      meloxicam (MOBIC) 15 MG tablet Take 1 tablet (15 mg total) by mouth once daily. For knee pain 30 tablet 0    metoprolol tartrate (LOPRESSOR) 25 MG tablet Take 25 mg by mouth. Take 1/2 tablet daily      POTASSIUM ORAL Take 20 mg by mouth as needed.       [DISCONTINUED] amitriptyline (ELAVIL) 50 MG tablet Take 50 mg by mouth. Take 3 tablets nightly      [DISCONTINUED] hydroCHLOROthiazide (HYDRODIURIL) 12.5 MG Tab Take 12.5 mg by mouth once daily.      [DISCONTINUED] methocarbamol (ROBAXIN) 500 MG Tab Take 500 mg by mouth. Take 3 tablet nightly         Past Medical History:   Diagnosis Date    Allergy     Arthritis     GERD (gastroesophageal reflux disease)     Hyperlipemia     Hypertension, uncontrolled 8/17/2018    Kidney problem     Meningitis     3 months old    Tachycardia        Family History   Problem Relation Age of Onset    Arthritis Mother     Glaucoma Mother     Asthma Daughter         reports that  has never smoked. she has never used smokeless tobacco. She reports that she does not drink alcohol.    Review of Systems  see hpi  Objective:     Vitals:    08/17/18 0835   BP: 130/80   Pulse:    Resp:    Temp:         Physical Exam   Constitutional: She appears well-developed. No distress.   HENT:   Head: Normocephalic and atraumatic.   Eyes: Conjunctivae are normal. No scleral icterus.   Pulmonary/Chest: Effort normal.   Musculoskeletal:        Legs:  Neurological: She is alert.   Skin: She is not diaphoretic.   Psychiatric: She has a normal mood and affect. Her behavior is normal.   Vitals reviewed.      Assessment:       1. Knee laceration, left, initial encounter    2. Chronic neck pain    3. Primary insomnia    4. Hypertension, uncontrolled    5. Obesity (BMI 30.0-34.9)        Plan:       Alyce was seen today for anxiety, knee pain and sinus problem.    Diagnoses and all orders for this visit:    Knee laceration, left, initial encounter  And evaluated and no sign of infection found.  Patient agreed to have laceration repaired today.  The inferior portion was sutured with reasonable approximation.  The a vertical portion was left to heal by secondary intention has too much of her dermis and epidermis had been avulsed at this  location.    Chronic neck pain  Chronic - stable - pt takes robaxin prn for neck pain for many years without side effects. This works well for her.     Primary insomnia  Continue amitriptyline 150mg at night prn insomnia.     No side effects.     Hypertension, uncontrolled  -     hydroCHLOROthiazide (MICROZIDE) 12.5 mg capsule; Take 1 capsule (12.5 mg total) by mouth once daily.  Pts blood pressure is uncontrolled.          Pt asked to keep BP log with date/BP, taking BP at least 4 x a week. They will bring this with them to their next appointment.     Pt asked to call me if BPs consistently above 140/90.    Pts questions were answered.    The CVD Risk score (D'Agostino, et al., 2008) failed to calculate for the following reasons:    Cannot find a previous total cholesterol lab      Obesity (BMI 30.0-34.9)    Chronic - stable          Follow-up in about 1 week (around 8/24/2018) for suture removal.        Pt verbalized understanding and agreed with our plan.

## 2018-08-17 NOTE — PROCEDURES
Laceration Repair  Date/Time: 8/17/2018 1:44 PM  Performed by: Bryson Davila MD  Authorized by: Bryson Davila MD   Consent Done: Yes  Consent: Verbal consent obtained.  Risks and benefits: risks, benefits and alternatives were discussed  Consent given by: patient  Patient understanding: patient states understanding of the procedure being performed  Patient consent: the patient's understanding of the procedure matches consent given  Procedure consent: procedure consent matches procedure scheduled  Body area: lower extremity  Location details: left knee  Laceration length: 4 cm  Contamination: The wound is contaminated.  Foreign body present: asphalt.  Tendon involvement: none  Nerve involvement: none  Vascular damage: no  Anesthesia: local infiltration    Anesthesia:  Local Anesthetic: lidocaine 1% with epinephrine  Anesthetic total: 4.5 mL  Patient sedated: no  Preparation: Patient was prepped and draped in the usual sterile fashion.  Amount of cleaning: standard  Debridement: minimal  Degree of undermining: none  Skin closure: 4-0 nylon  Number of sutures: 5  Technique: simple  Approximation: close  Approximation difficulty: simple  Dressing: 4x4 sterile gauze, antibiotic ointment and non-stick sterile dressing (tegaderm)  Patient tolerance: Patient tolerated the procedure well with no immediate complications

## 2018-08-20 DIAGNOSIS — M25.562 ACUTE PAIN OF LEFT KNEE: ICD-10-CM

## 2018-08-20 DIAGNOSIS — F41.9 ANXIETY: ICD-10-CM

## 2018-08-20 NOTE — TELEPHONE ENCOUNTER
----- Message from Jeaneth Blum sent at 8/20/2018 12:49 PM CDT -----  Contact: Self  Pt is calling to get refill on medication  FLUoxetine (PROZAC) 20 MG capsule  And meloxicam (MOBIC) 15 MG tablet. Please call pt at [phone] if she needs to continue or not. If so please send refills over to      [facility] - [place], [place] - [address] [phone] (Phone)  [phone] (Fax)

## 2018-08-21 RX ORDER — FLUOXETINE HYDROCHLORIDE 20 MG/1
20 CAPSULE ORAL EVERY MORNING
Qty: 30 CAPSULE | Refills: 1 | Status: SHIPPED | OUTPATIENT
Start: 2018-08-21 | End: 2018-10-19 | Stop reason: SDUPTHER

## 2018-08-21 RX ORDER — MELOXICAM 15 MG/1
15 TABLET ORAL DAILY
Qty: 30 TABLET | Refills: 1 | Status: SHIPPED | OUTPATIENT
Start: 2018-08-21 | End: 2018-11-20 | Stop reason: SDUPTHER

## 2018-08-22 NOTE — TELEPHONE ENCOUNTER
Notified patient of Rx refill approval. Verbalized understanding    Patient stated have not received call regarding referral with [facility] scheduling for NEU47 - NERVE CONDUCTION TEST  13113 (CPT®) - [place] NERVE CONDUCTION STUDY; 1-2 STUDIES. Can you help call patient regarding scheduling? Thanks!

## 2018-08-23 ENCOUNTER — OFFICE VISIT (OUTPATIENT)
Dept: FAMILY MEDICINE | Facility: CLINIC | Age: 58
End: 2018-08-23
Payer: COMMERCIAL

## 2018-08-23 ENCOUNTER — LAB VISIT (OUTPATIENT)
Dept: LAB | Facility: HOSPITAL | Age: 58
End: 2018-08-23
Attending: FAMILY MEDICINE
Payer: COMMERCIAL

## 2018-08-23 ENCOUNTER — TELEPHONE (OUTPATIENT)
Dept: FAMILY MEDICINE | Facility: CLINIC | Age: 58
End: 2018-08-23

## 2018-08-23 VITALS
SYSTOLIC BLOOD PRESSURE: 134 MMHG | HEIGHT: 66 IN | RESPIRATION RATE: 16 BRPM | OXYGEN SATURATION: 95 % | HEART RATE: 94 BPM | DIASTOLIC BLOOD PRESSURE: 70 MMHG | WEIGHT: 197.56 LBS | BODY MASS INDEX: 31.75 KG/M2 | TEMPERATURE: 99 F

## 2018-08-23 DIAGNOSIS — S81.012D KNEE LACERATION, LEFT, SUBSEQUENT ENCOUNTER: ICD-10-CM

## 2018-08-23 DIAGNOSIS — G56.02 ACUTE CARPAL TUNNEL SYNDROME OF LEFT WRIST: Primary | ICD-10-CM

## 2018-08-23 DIAGNOSIS — F41.9 ANXIETY: ICD-10-CM

## 2018-08-23 DIAGNOSIS — J30.9 ALLERGIC RHINITIS, UNSPECIFIED SEASONALITY, UNSPECIFIED TRIGGER: ICD-10-CM

## 2018-08-23 DIAGNOSIS — Z00.00 ANNUAL PHYSICAL EXAM: Primary | ICD-10-CM

## 2018-08-23 DIAGNOSIS — Z00.00 ANNUAL PHYSICAL EXAM: ICD-10-CM

## 2018-08-23 DIAGNOSIS — I10 HYPERTENSION, WELL CONTROLLED: ICD-10-CM

## 2018-08-23 LAB
ALBUMIN SERPL BCP-MCNC: 3.9 G/DL
ALP SERPL-CCNC: 79 U/L
ALT SERPL W/O P-5'-P-CCNC: 20 U/L
ANION GAP SERPL CALC-SCNC: 12 MMOL/L
AST SERPL-CCNC: 24 U/L
BASOPHILS # BLD AUTO: 0.03 K/UL
BASOPHILS NFR BLD: 0.3 %
BILIRUB SERPL-MCNC: 0.3 MG/DL
BUN SERPL-MCNC: 14 MG/DL
CALCIUM SERPL-MCNC: 9.6 MG/DL
CHLORIDE SERPL-SCNC: 99 MMOL/L
CHOLEST SERPL-MCNC: 231 MG/DL
CHOLEST/HDLC SERPL: 5 {RATIO}
CO2 SERPL-SCNC: 30 MMOL/L
CREAT SERPL-MCNC: 0.9 MG/DL
DIFFERENTIAL METHOD: ABNORMAL
EOSINOPHIL # BLD AUTO: 0.1 K/UL
EOSINOPHIL NFR BLD: 1.4 %
ERYTHROCYTE [DISTWIDTH] IN BLOOD BY AUTOMATED COUNT: 14.8 %
EST. GFR  (AFRICAN AMERICAN): >60 ML/MIN/1.73 M^2
EST. GFR  (NON AFRICAN AMERICAN): >60 ML/MIN/1.73 M^2
ESTIMATED AVG GLUCOSE: 108 MG/DL
GLUCOSE SERPL-MCNC: 114 MG/DL
HBA1C MFR BLD HPLC: 5.4 %
HCT VFR BLD AUTO: 39.3 %
HDLC SERPL-MCNC: 46 MG/DL
HDLC SERPL: 19.9 %
HGB BLD-MCNC: 12 G/DL
IMM GRANULOCYTES # BLD AUTO: 0.02 K/UL
IMM GRANULOCYTES NFR BLD AUTO: 0.2 %
LDLC SERPL CALC-MCNC: 157 MG/DL
LYMPHOCYTES # BLD AUTO: 2.6 K/UL
LYMPHOCYTES NFR BLD: 28.6 %
MCH RBC QN AUTO: 27.8 PG
MCHC RBC AUTO-ENTMCNC: 30.5 G/DL
MCV RBC AUTO: 91 FL
MONOCYTES # BLD AUTO: 0.6 K/UL
MONOCYTES NFR BLD: 6.5 %
NEUTROPHILS # BLD AUTO: 5.7 K/UL
NEUTROPHILS NFR BLD: 63 %
NONHDLC SERPL-MCNC: 185 MG/DL
NRBC BLD-RTO: 0 /100 WBC
PLATELET # BLD AUTO: 305 K/UL
PMV BLD AUTO: 10.8 FL
POTASSIUM SERPL-SCNC: 3 MMOL/L
PROT SERPL-MCNC: 7.7 G/DL
RBC # BLD AUTO: 4.32 M/UL
SODIUM SERPL-SCNC: 141 MMOL/L
TRIGL SERPL-MCNC: 140 MG/DL
WBC # BLD AUTO: 9.12 K/UL

## 2018-08-23 PROCEDURE — 99999 PR PBB SHADOW E&M-EST. PATIENT-LVL III: CPT | Mod: PBBFAC,,, | Performed by: FAMILY MEDICINE

## 2018-08-23 PROCEDURE — 85025 COMPLETE CBC W/AUTO DIFF WBC: CPT

## 2018-08-23 PROCEDURE — 83036 HEMOGLOBIN GLYCOSYLATED A1C: CPT

## 2018-08-23 PROCEDURE — 86803 HEPATITIS C AB TEST: CPT

## 2018-08-23 PROCEDURE — 3008F BODY MASS INDEX DOCD: CPT | Mod: CPTII,S$GLB,, | Performed by: FAMILY MEDICINE

## 2018-08-23 PROCEDURE — 80061 LIPID PANEL: CPT

## 2018-08-23 PROCEDURE — 36415 COLL VENOUS BLD VENIPUNCTURE: CPT | Mod: PO

## 2018-08-23 PROCEDURE — 99214 OFFICE O/P EST MOD 30 MIN: CPT | Mod: 24,S$GLB,, | Performed by: FAMILY MEDICINE

## 2018-08-23 PROCEDURE — 80053 COMPREHEN METABOLIC PANEL: CPT

## 2018-08-23 NOTE — PROGRESS NOTES
Health Maintenance     Hepatitis C Screening Sign [facility]     Mammogram Sign [facility]

## 2018-08-23 NOTE — PROGRESS NOTES
Subjective:       Patient ID: Alyce Zurita is a 58 y.o. female.    Chief Complaint:Otalgia (both ears off and on for past month ), anxiety f/u, l knee laceration    HPI    L ear congestion - Pt has int pressure sensation in her R ear x 1 week.     flonase and claritin are somewhat effective    L knee laceration - significantly improved. Removed 5 sutures. No compalints of pain or drainage.     Anxiety - pt feel significant improvement fluoxetine like to continue current dose.  Patient denies any side effects.    CT - pt had a NCS and we reviewed her results together which showed severe left-sided carpal tunnel and mild right-sided carpal tunnel syndrome.  Patient is interested in intervention for her left side and requested a referral to Neurosurgery.    Current Outpatient Medications on File Prior to Visit   Medication Sig Dispense Refill    amitriptyline (ELAVIL) 150 MG Tab Take 1 tablet (150 mg total) by mouth nightly as needed. 90 tablet 3    cholecalciferol, vitamin D3, (VITAMIN D3 ORAL) Take by mouth 2 (two) times daily.      esomeprazole magnesium (NEXIUM ORAL) Take by mouth once daily.      FLUoxetine (PROZAC) 20 MG capsule Take 1 capsule (20 mg total) by mouth every morning. 30 capsule 1    fluticasone (FLONASE) 50 mcg/actuation nasal spray 2 sprays by Each Nare route once daily.      gabapentin (NEURONTIN) 300 MG capsule Take 300 mg by mouth. Take 2 tablet in the morning and 2 tablet nightly      glucosamine-chondroitin 500-400 mg tablet Take 1 tablet by mouth once daily.      hydroCHLOROthiazide (MICROZIDE) 12.5 mg capsule Take 1 capsule (12.5 mg total) by mouth once daily. 90 capsule 0    ibuprofen (ADVIL,MOTRIN) 600 MG tablet Take 600 mg by mouth as needed for Pain.      meloxicam (MOBIC) 15 MG tablet Take 1 tablet (15 mg total) by mouth once daily. For knee pain 30 tablet 1    methocarbamol (ROBAXIN) 500 MG Tab Take 1 tablet (500 mg total) by mouth nightly as needed. 90 tablet 0     metoprolol tartrate (LOPRESSOR) 25 MG tablet Take 25 mg by mouth. Take 1/2 tablet daily      POTASSIUM ORAL Take 20 mg by mouth as needed.       No current facility-administered medications on file prior to visit.        Past Medical History:   Diagnosis Date    Allergy     Arthritis     GERD (gastroesophageal reflux disease)     Hyperlipemia     Hypertension, uncontrolled 8/17/2018    Kidney problem     Meningitis     3 months old    Tachycardia        Family History   Problem Relation Age of Onset    Arthritis Mother     Glaucoma Mother     Asthma Daughter         reports that  has never smoked. she has never used smokeless tobacco. She reports that she does not drink alcohol.    Review of Systems   Constitutional: Negative for chills and fever.   Respiratory: Negative for cough and shortness of breath.        Objective:     Vitals:    08/23/18 1316   BP: 134/70   Pulse: 94   Resp: 16   Temp: 99 °F (37.2 °C)        Physical Exam   Constitutional: She appears well-developed. No distress.   HENT:   Head: Normocephalic and atraumatic.   Eyes: Conjunctivae are normal. No scleral icterus.   Pulmonary/Chest: Effort normal.   Neurological: She is alert.   Skin: She is not diaphoretic.        Psychiatric: She has a normal mood and affect. Her speech is normal and behavior is normal.   Vitals reviewed.      Assessment:       1. Acute carpal tunnel syndrome of left wrist    2. Hypertension, well controlled    3. Anxiety    4. Knee laceration, left, subsequent encounter    5. Allergic rhinitis, unspecified seasonality, unspecified trigger        Plan:       Alyce was seen today for suture / staple removal and otalgia.    Diagnoses and all orders for this visit:    Acute carpal tunnel syndrome of left wrist  -     Ambulatory referral to Neurosurgery  New dx - pt educated on disease etiology, prognosis and treatment. Answered pts questions.    Hypertension, well controlled  - Chronic - stable     Pt is doing well  on current therapy. No side effects noted. Will continue current therapy.    Anxiety  - Chronic - stable     Pt is doing well on current therapy. No side effects noted. Will continue current therapy.    Knee laceration, left, subsequent encounter  5 sutures removed. Healing well.     Allergic rhinitis, unspecified seasonality, unspecified trigger  Pt to continue flonase and change claritin to zyrtec. If no improvement in 1 week pt will notify me.             Follow-up in about 2 months (around 10/23/2018) for Annual Physical.      Pt verbalized understanding and agreed with our plan.

## 2018-08-24 ENCOUNTER — TELEPHONE (OUTPATIENT)
Dept: FAMILY MEDICINE | Facility: CLINIC | Age: 58
End: 2018-08-24

## 2018-08-24 LAB — HCV AB SERPL QL IA: NEGATIVE

## 2018-08-24 NOTE — TELEPHONE ENCOUNTER
----- Message from Bryson Davila MD sent at 8/24/2018 12:26 PM CDT -----  Please notify patient results are abnormal. Nothing emergent needs to be done. Please have the patient follow up with me in 3-4 weeks to discuss if not already scheduled in this time frame. Thanks.

## 2018-08-28 ENCOUNTER — TELEPHONE (OUTPATIENT)
Dept: ADMINISTRATIVE | Facility: HOSPITAL | Age: 58
End: 2018-08-28

## 2018-09-05 ENCOUNTER — TELEPHONE (OUTPATIENT)
Dept: FAMILY MEDICINE | Facility: CLINIC | Age: 58
End: 2018-09-05

## 2018-09-05 DIAGNOSIS — H93.8X9 EAR CONGESTION, UNSPECIFIED LATERALITY: Primary | ICD-10-CM

## 2018-09-05 RX ORDER — PREDNISONE 20 MG/1
20 TABLET ORAL DAILY
Qty: 3 TABLET | Refills: 0 | Status: SHIPPED | OUTPATIENT
Start: 2018-09-05 | End: 2018-09-08

## 2018-09-05 NOTE — TELEPHONE ENCOUNTER
Pt seen on 8/23/18; states was told by Dr Davila that she had fluid behind her ears and to use flonase nasal spray; she has been using with no relief; would like another medication sent to pharmacy

## 2018-09-05 NOTE — TELEPHONE ENCOUNTER
----- Message from Jeaneth Blum sent at 9/5/2018 10:05 AM CDT -----  Contact: Self  Pt is calling to state nasal spray isnt working and would like something called in. Please call pt at [phone].

## 2018-09-07 ENCOUNTER — TELEPHONE (OUTPATIENT)
Dept: NEUROSURGERY | Facility: CLINIC | Age: 58
End: 2018-09-07

## 2018-09-10 ENCOUNTER — INITIAL CONSULT (OUTPATIENT)
Dept: NEUROSURGERY | Facility: CLINIC | Age: 58
End: 2018-09-10
Payer: COMMERCIAL

## 2018-09-10 VITALS
BODY MASS INDEX: 32.1 KG/M2 | HEART RATE: 84 BPM | HEIGHT: 66 IN | SYSTOLIC BLOOD PRESSURE: 142 MMHG | WEIGHT: 199.75 LBS | DIASTOLIC BLOOD PRESSURE: 77 MMHG

## 2018-09-10 DIAGNOSIS — M25.532 WRIST PAIN, CHRONIC, LEFT: ICD-10-CM

## 2018-09-10 DIAGNOSIS — G89.29 WRIST PAIN, CHRONIC, LEFT: ICD-10-CM

## 2018-09-10 DIAGNOSIS — G56.02 CARPAL TUNNEL SYNDROME OF LEFT WRIST: Primary | ICD-10-CM

## 2018-09-10 PROCEDURE — 3008F BODY MASS INDEX DOCD: CPT | Mod: CPTII,S$GLB,, | Performed by: NEUROLOGICAL SURGERY

## 2018-09-10 PROCEDURE — 99204 OFFICE O/P NEW MOD 45 MIN: CPT | Mod: S$GLB,,, | Performed by: NEUROLOGICAL SURGERY

## 2018-09-10 PROCEDURE — 99999 PR PBB SHADOW E&M-EST. PATIENT-LVL IV: CPT | Mod: PBBFAC,,, | Performed by: NEUROLOGICAL SURGERY

## 2018-09-10 RX ORDER — MUPIROCIN 20 MG/G
OINTMENT TOPICAL
Status: CANCELLED | OUTPATIENT
Start: 2018-09-10

## 2018-09-10 RX ORDER — MUPIROCIN 20 MG/G
1 OINTMENT TOPICAL
Status: CANCELLED | OUTPATIENT
Start: 2018-09-10

## 2018-09-10 RX ORDER — SODIUM CHLORIDE 9 MG/ML
20 INJECTION, SOLUTION INTRAVENOUS CONTINUOUS
Status: CANCELLED | OUTPATIENT
Start: 2018-09-10

## 2018-09-10 NOTE — LETTER
September 10, 2018      Bryson Davila MD  [address] [facility] [address]           [facility]  [address] [facility] [address]  [place] [address]  Phone: [phone]  Fax: [phone]          Patient: Alyce Zurita   MR Number: [id number]   YOB: 1960   Date of Visit: 9/10/2018       Dear Dr. Bryson Davila:    Thank you for referring Alyce Zurita to me for evaluation. Attached you will find relevant portions of my assessment and plan of care.    If you have questions, please do not hesitate to call me. I look forward to following Alyce Zurita along with you.    Sincerely,    Thomas Colbert, DO    Enclosure  CC:  No Recipients    If you would like to receive this communication electronically, please contact [email or web address] or [phone] to request more information on [facility] access.    For providers and/or their staff who would like to refer a patient to [facility], please contact us through our one-stop-shop provider referral line, [facility], at [phone].    If you feel you have received this communication in error or would no longer like to receive these types of communications, please e-mail [email or web address]

## 2018-09-10 NOTE — PROGRESS NOTES
"CHIEF COMPLAINT:  Chief Complaint   Patient presents with    Carpal Tunnel     left hand       HPI:  lAyce Zurita is a 58 y.o.  female with below listed PMH, who is referred by PCP and neurology for evaluation of left carpal tunnel syndrome.  She has worked for many years as a  and a . She reports that she has pain particularly in the 1st through 3rd digits of her left hand.  It has been ongoing for years and she has been wearing braces at night.  If the pain is bad she will wear the brace during the day. The pain wakes her up at night and she finds herself holding her arm up in the air to regain feeling.  She does not notice any particular weakness except when the numbness and discomfort is at its worst.  She does report occasionally she will have pain that starts at the base of her neck on the left which wraps around her shoulder and descends down to the 1st 3 digits of her hand.  She also has pain in the wrist and palm when she is over using that arm.  The right upper extremity is okay.  She does feel like she has always been a "clots" and trips easily over things.  She has never done physical therapy or had any injections in the hand.  She recently had an EMG by Dr. Little which showed mild carpal tunnel on the right and moderate to severe carpal tunnel on the left.    Review of patient's allergies indicates:  No Known Allergies    Past Medical History:   Diagnosis Date    Allergy     Arthritis     Carpal tunnel syndrome of left wrist 9/10/2018    GERD (gastroesophageal reflux disease)     Hyperlipemia     Hypertension, uncontrolled 8/17/2018    Kidney problem     Meningitis     3 months old    Tachycardia      Past Surgical History:   Procedure Laterality Date    APPENDECTOMY      Heart Procedure       HYSTERECTOMY      KIDNEY SURGERY       Family History   Problem Relation Age of Onset    Arthritis Mother     Glaucoma Mother     Asthma Daughter      Social History "     Tobacco Use    Smoking status: Never Smoker    Smokeless tobacco: Never Used   Substance Use Topics    Alcohol use: No    Drug use: Not on file        Review of Systems   Constitutional: Negative.    Respiratory: Negative for cough and shortness of breath.    Cardiovascular: Negative for chest pain, palpitations, claudication and leg swelling.   Gastrointestinal: Negative for abdominal pain, constipation and diarrhea.   Genitourinary: Negative for flank pain, frequency and urgency.   Musculoskeletal: Positive for joint pain (Left wrist with overuse) and neck pain (Left neck and skull base). Negative for back pain and falls.   Skin: Negative.    Neurological: Positive for tingling and sensory change. Negative for dizziness, tremors, speech change, focal weakness, seizures, loss of consciousness and headaches.   Psychiatric/Behavioral: Negative.        OBJECTIVE:   Vital Signs:  Pulse: 84 (09/10/18 0811)  BP: (!) 142/77 (09/10/18 0811)    Physical Exam:  Constitutional: Patient sitting comfortably in chair. Appears well developed and well nourished.  Skin: Exposed areas are intact without abnormal markings, rashes or other lesions.  HEENT: Normocephalic. Normal conjunctivae.  Cardiovascular: Normal rate and regular rhythm.  Respiratory: Chest wall rises and falls symmetrically, without signs of respiratory distress.  Abdomen: Soft and non-tender.  Extremities: Warm and without edema. Calves supple, non-tender.  Psych/Behavior: Normal affect.    Neurological:    Mental status: Alert and oriented. Conversational and appropriate.       Cranial Nerves: VFF to confrontation. PERRL. EOMI without nystagmus. Facial STLT normal and symmetric. Strong, symmetric muscles of mastication. Facial strength full and symmetric. Hearing equal bilaterally to finger rub. Palate and uvula rise and fall normally in midline. Shoulder shrug 5/5 strength. Tongue midline.     Motor:    Upper:  Deltoids Triceps Biceps WE WF  FA    R  5/5 5/5 5/5 5/5 5/5 5/5 5/5    L 5/5 5/5 5/5 5/5 5/5 5/5 5/5      Lower:  HF KE KF DF PF EHL    R 5/5 5/5 5/5 5/5 5/5 5/5    L 5/5 5/5 5/5 5/5 5/5 5/5     Sensory: diminished light touch and pinprick in lateral 3 digits of left hand (about 80% compared to right).     Reflexes:      DTR: 2+ knees and biceps symmetrically.     Cope's: Negative.     Babinski's: Negative.     Clonus: Negative.    Cerebellar: Finger-to-nose and rapid alternating movements normal.     Gait:  Stable, fluid.    Spine:  Cervical:      ROM: Full with flexion, extension, lateral rotation and ear-to-shoulder bend.      Midline TTP: Negative.     Spurling's test: Negative.     Lhermitte's: Negative.    Hand:  Phalens: positive  Tinels: positive     Diagnostic Results:  All imaging was independently reviewed by me.    EMG/Nerve conduction study, dated 8/4/18:  1. Moderate to severe CTS on LEFT  2. Mild CTS on RIGHT      ASSESSMENT/PLAN:     Problem List Items Addressed This Visit        Neuro    Carpal tunnel syndrome of left wrist - Primary    Relevant Orders    Case Request Operating Room: RELEASE, CARPAL TUNNEL (Completed)       Orthopedic    Wrist pain, chronic, left          VISIT SUMMARY:  Patient has both clinical and EMG evidence of CTS on left (moderate to severe).  She has additional areas of pain in the wrist and base of thumb, which could be related to tendonitis or tenosynovitis due to overuse.  I explained that she would be a good candidate for carpal tunnel release surgery on the left which would address the carpal tunnel syndrome symptoms in the lateral 3 digits of her hand but would not likely address the other areas of pain as they are due to a different process.  She also has some radicular type pain but explained that it would be reasonable to do the carpal tunnel release 1st and if her pain or sensory changes persisted then we would work up further with an MRI to rule out spine involvement.    PATIENT EDUCATION:  More than  half the clinic visit was spent showing with patient the pertinent findings on imaging and educating the patient about natural history of the pathology.   We discussed options for treatment as well as the risks and benefits of each option.  All questions were answered.     The patient understands and agrees with the following plan of care.    1. Left carpal tunnel release surgery scheduled for 10/24/18 at [facility]  2. Will need preop clearance from PCP (has appt soon)  3. Preop PAT visit requested  4. Preop labs and studies ordered                .

## 2018-09-10 NOTE — PATIENT INSTRUCTIONS
1. Carpal tunnel release surgery scheduled for 10/24/18 at [facility]  2. Will need preop clearance from PCP  3. Preop PAT visit requested  4. Labs and studies ordered

## 2018-09-10 NOTE — LETTER
September 10, 2018                 [facility] - Neurosurgery  Neurosurgery  [address]  [place] [address]  Phone: [phone]  Fax: [phone]   September 10, 2018     Patient: Alyce Zurita   YOB: 1960   Date of Visit: 9/10/2018       To Whom it May Concern:    Alyce Zurita was seen in my clinic on 9/10/2018. She may return to work on 9/11/18.    If you have any questions or concerns, please don't hesitate to call.    Sincerely,         Thomas Colbert, DO

## 2018-09-14 ENCOUNTER — OFFICE VISIT (OUTPATIENT)
Dept: FAMILY MEDICINE | Facility: CLINIC | Age: 58
End: 2018-09-14
Payer: COMMERCIAL

## 2018-09-14 VITALS
DIASTOLIC BLOOD PRESSURE: 80 MMHG | RESPIRATION RATE: 20 BRPM | HEIGHT: 66 IN | TEMPERATURE: 99 F | SYSTOLIC BLOOD PRESSURE: 134 MMHG | BODY MASS INDEX: 31.78 KG/M2 | HEART RATE: 81 BPM | OXYGEN SATURATION: 96 % | WEIGHT: 197.75 LBS

## 2018-09-14 DIAGNOSIS — M54.2 CHRONIC NECK PAIN: ICD-10-CM

## 2018-09-14 DIAGNOSIS — M65.332 TRIGGER FINGER, LEFT MIDDLE FINGER: ICD-10-CM

## 2018-09-14 DIAGNOSIS — E78.5 HYPERLIPIDEMIA, UNSPECIFIED HYPERLIPIDEMIA TYPE: ICD-10-CM

## 2018-09-14 DIAGNOSIS — J01.00 ACUTE MAXILLARY SINUSITIS, RECURRENCE NOT SPECIFIED: Primary | ICD-10-CM

## 2018-09-14 DIAGNOSIS — G56.03 BILATERAL CARPAL TUNNEL SYNDROME: ICD-10-CM

## 2018-09-14 DIAGNOSIS — Z23 NEED FOR INFLUENZA VACCINATION: ICD-10-CM

## 2018-09-14 DIAGNOSIS — G89.29 CHRONIC NECK PAIN: ICD-10-CM

## 2018-09-14 PROCEDURE — 90686 IIV4 VACC NO PRSV 0.5 ML IM: CPT | Mod: S$GLB,,, | Performed by: FAMILY MEDICINE

## 2018-09-14 PROCEDURE — 99999 PR PBB SHADOW E&M-EST. PATIENT-LVL III: CPT | Mod: PBBFAC,,, | Performed by: FAMILY MEDICINE

## 2018-09-14 PROCEDURE — 90471 IMMUNIZATION ADMIN: CPT | Mod: S$GLB,,, | Performed by: FAMILY MEDICINE

## 2018-09-14 PROCEDURE — 3008F BODY MASS INDEX DOCD: CPT | Mod: CPTII,S$GLB,, | Performed by: FAMILY MEDICINE

## 2018-09-14 PROCEDURE — 99214 OFFICE O/P EST MOD 30 MIN: CPT | Mod: 25,S$GLB,, | Performed by: FAMILY MEDICINE

## 2018-09-14 RX ORDER — GABAPENTIN 300 MG/1
CAPSULE ORAL
Qty: 360 CAPSULE | Refills: 3 | Status: SHIPPED | OUTPATIENT
Start: 2018-09-14 | End: 2018-12-13 | Stop reason: SDUPTHER

## 2018-09-14 RX ORDER — FLUCONAZOLE 150 MG/1
150 TABLET ORAL ONCE
Qty: 1 TABLET | Refills: 0 | Status: SHIPPED | OUTPATIENT
Start: 2018-09-14 | End: 2018-09-14

## 2018-09-14 RX ORDER — AMOXICILLIN AND CLAVULANATE POTASSIUM 875; 125 MG/1; MG/1
1 TABLET, FILM COATED ORAL 2 TIMES DAILY
Qty: 14 TABLET | Refills: 0 | Status: SHIPPED | OUTPATIENT
Start: 2018-09-14 | End: 2018-09-21

## 2018-09-14 NOTE — PROGRESS NOTES
Health Maintenance     Mammogram Orders in for mammogram - pt will call back the schedule mammogram    Influenza Vaccine Pending orders ; ok to get flu vaccine today

## 2018-09-14 NOTE — PROGRESS NOTES
Subjective:       Patient ID: Alyce Zurita is a 58 y.o. female.    Chief Complaint: Results (recent labs )    HPI        Sinus pressure - Worsening and Pt was on steroids given to her by another provider, but after the medication course was over her symptoms came back and were much worse than they were before.  Patient sinus pressures intense at times associated with postnasal drip rhinorrhea that has been going on now for more than 4 weeks    High Cholesterol - reviewed lab results and cholesterol of 231. ASCVD 5.1%     htn - well controlled on hctz and metoprolol without side effect. Bp better control today.     Chronic neck neck - pt is doing well with gabapentin. No SE, requesting refill.      Carpal Tunnel - patient is scheduled to have surgery a few weeks on her left wrist.  Current Outpatient Medications on File Prior to Visit   Medication Sig Dispense Refill    amitriptyline (ELAVIL) 150 MG Tab Take 1 tablet (150 mg total) by mouth nightly as needed. 90 tablet 3    cholecalciferol, vitamin D3, (VITAMIN D3 ORAL) Take by mouth 2 (two) times daily.      esomeprazole magnesium (NEXIUM ORAL) Take by mouth once daily.      FLUoxetine (PROZAC) 20 MG capsule Take 1 capsule (20 mg total) by mouth every morning. 30 capsule 1    fluticasone (FLONASE) 50 mcg/actuation nasal spray 2 sprays by Each Nare route once daily.      glucosamine-chondroitin 500-400 mg tablet Take 1 tablet by mouth once daily.      hydroCHLOROthiazide (MICROZIDE) 12.5 mg capsule Take 1 capsule (12.5 mg total) by mouth once daily. 90 capsule 0    ibuprofen (ADVIL,MOTRIN) 600 MG tablet Take 600 mg by mouth as needed for Pain.      meloxicam (MOBIC) 15 MG tablet Take 1 tablet (15 mg total) by mouth once daily. For knee pain 30 tablet 1    methocarbamol (ROBAXIN) 500 MG Tab Take 1 tablet (500 mg total) by mouth nightly as needed. 90 tablet 0    metoprolol tartrate (LOPRESSOR) 25 MG tablet Take 25 mg by mouth. Take 1/2 tablet daily       POTASSIUM ORAL Take 20 mg by mouth as needed.      [DISCONTINUED] gabapentin (NEURONTIN) 300 MG capsule Take 300 mg by mouth. Take 2 tablet in the morning and 2 tablet nightly       No current facility-administered medications on file prior to visit.        Past Medical History:   Diagnosis Date    Allergy     Arthritis     Carpal tunnel syndrome of left wrist 9/10/2018    GERD (gastroesophageal reflux disease)     Hyperlipemia     Hypertension, uncontrolled 8/17/2018    Kidney problem     Meningitis     3 months old    Tachycardia        Family History   Problem Relation Age of Onset    Arthritis Mother     Glaucoma Mother     Asthma Daughter         reports that  has never smoked. she has never used smokeless tobacco. She reports that she does not drink alcohol.    Review of Systems   HENT: Positive for postnasal drip and sinus pressure.    Neurological: Positive for weakness and numbness.       Objective:     Vitals:    09/14/18 1105   BP: 134/80   Pulse: 81   Resp: 20   Temp: 98.7 °F (37.1 °C)        Physical Exam   Constitutional: She appears well-developed. No distress.   O   HENT:   Head: Normocephalic and atraumatic.   Eyes: Conjunctivae are normal. No scleral icterus.   Pulmonary/Chest: Effort normal.   Musculoskeletal:   Tenderness along middle finger flexor tendon with locking.    Neurological: She is alert.   Skin: She is not diaphoretic.   Psychiatric: She has a normal mood and affect. Her behavior is normal.   Vitals reviewed.      Assessment:       1. Acute maxillary sinusitis, recurrence not specified    2. Hyperlipidemia, unspecified hyperlipidemia type    3. Trigger finger, left middle finger    4. Bilateral carpal tunnel syndrome    5. Need for influenza vaccination    6. Chronic neck pain        Plan:       Alyce was seen today for results.    Diagnoses and all orders for this visit:    Acute maxillary sinusitis, recurrence not specified  -     amoxicillin-clavulanate 875-125mg  (AUGMENTIN) 875-125 mg per tablet; Take 1 tablet by mouth 2 (two) times daily. for 7 days  -     fluconazole (DIFLUCAN) 150 MG Tab; Take 1 tablet (150 mg total) by mouth once. for 1 dose  Pts sxs and pex suggest acute sinusitis. Duration of illness is greater than 10 days. Will treat as above. Pt to call back or notify me if sxs worsen. Rec'd pt to eat yogurt daily If appropriate.    Hyperlipidemia, unspecified hyperlipidemia type  ASCVD risk of 5.1% - advised dietary changes. Reduce red meat and fired foods.     Trigger finger, left middle finger  New dx - pt educated on disease etiology, prognosis and treatment. Answered pts questions.    Bilateral carpal tunnel syndrome  Patient with upcoming surgery on the left side.  Followed by Neurosurgery.    Need for influenza vaccination  -     Influenza - Quadrivalent (3 years & older) (PF)            Follow-up in about 1 day (around 9/15/2018) for trigger finger.        Pt verbalized understanding and agreed with our plan.

## 2018-09-17 ENCOUNTER — OFFICE VISIT (OUTPATIENT)
Dept: FAMILY MEDICINE | Facility: CLINIC | Age: 58
End: 2018-09-17
Payer: COMMERCIAL

## 2018-09-17 VITALS
HEIGHT: 66 IN | TEMPERATURE: 99 F | WEIGHT: 196.44 LBS | SYSTOLIC BLOOD PRESSURE: 126 MMHG | BODY MASS INDEX: 31.57 KG/M2 | OXYGEN SATURATION: 95 % | HEART RATE: 85 BPM | RESPIRATION RATE: 16 BRPM | DIASTOLIC BLOOD PRESSURE: 74 MMHG

## 2018-09-17 DIAGNOSIS — M65.332 TRIGGER FINGER, LEFT MIDDLE FINGER: Primary | ICD-10-CM

## 2018-09-17 PROCEDURE — 99499 UNLISTED E&M SERVICE: CPT | Mod: S$GLB,,, | Performed by: FAMILY MEDICINE

## 2018-09-17 PROCEDURE — 20550 NJX 1 TENDON SHEATH/LIGAMENT: CPT | Mod: F2,S$GLB,, | Performed by: FAMILY MEDICINE

## 2018-09-17 PROCEDURE — 99999 PR PBB SHADOW E&M-EST. PATIENT-LVL IV: CPT | Mod: PBBFAC,,, | Performed by: FAMILY MEDICINE

## 2018-09-17 NOTE — PROCEDURES
Procedures   Patient informed and written consent obtained after discussing R/B/A, all patients questions answered, patient voiced understanding.  Indications: trigger finger L middle finger    Pertinent lab values: NA    Type of anesthesia: Gebauer's anesthetic cold spray  Procedure:  Patient prepped in sterile fashion, trigger points identified at base of L thumb, 20 mg (0.5cc) of kenalog and 0.5 cc of lidocaine 1% without epi injected into trigger finger location.      Complications: none  Estimated blood loss: none  Patient tolerated procedure well.  Given wound care instructions and instructions on activity and when to return to full activity.    NDC from [id number]    CPT: [id number]       for kenalog

## 2018-10-19 DIAGNOSIS — F41.9 ANXIETY: ICD-10-CM

## 2018-10-19 RX ORDER — FLUOXETINE HYDROCHLORIDE 20 MG/1
20 CAPSULE ORAL EVERY MORNING
Qty: 90 CAPSULE | Refills: 1 | Status: SHIPPED | OUTPATIENT
Start: 2018-10-19 | End: 2018-10-26 | Stop reason: SDUPTHER

## 2018-10-22 ENCOUNTER — TELEPHONE (OUTPATIENT)
Dept: NEUROSURGERY | Facility: CLINIC | Age: 58
End: 2018-10-22

## 2018-10-22 NOTE — TELEPHONE ENCOUNTER
----- Message from Meka Ford sent at 10/22/2018  2:26 PM CDT -----  Contact: self  Pt requesting to cancel 10.24.18 procedure. Contact pt at [phone]

## 2018-10-22 NOTE — TELEPHONE ENCOUNTER
Returned call patient states something has come up she wants to cancel carpal tunnel surgery, she will call back when she is ready to re-schedule.

## 2018-10-26 ENCOUNTER — OFFICE VISIT (OUTPATIENT)
Dept: FAMILY MEDICINE | Facility: CLINIC | Age: 58
End: 2018-10-26
Payer: COMMERCIAL

## 2018-10-26 VITALS
HEIGHT: 66 IN | SYSTOLIC BLOOD PRESSURE: 128 MMHG | DIASTOLIC BLOOD PRESSURE: 78 MMHG | HEART RATE: 101 BPM | WEIGHT: 202.19 LBS | OXYGEN SATURATION: 95 % | RESPIRATION RATE: 20 BRPM | BODY MASS INDEX: 32.49 KG/M2 | TEMPERATURE: 99 F

## 2018-10-26 DIAGNOSIS — G56.02 CARPAL TUNNEL SYNDROME OF LEFT WRIST: ICD-10-CM

## 2018-10-26 DIAGNOSIS — F41.9 ANXIETY: ICD-10-CM

## 2018-10-26 DIAGNOSIS — F51.01 PRIMARY INSOMNIA: ICD-10-CM

## 2018-10-26 DIAGNOSIS — J30.89 NON-SEASONAL ALLERGIC RHINITIS DUE TO OTHER ALLERGIC TRIGGER: Primary | ICD-10-CM

## 2018-10-26 PROCEDURE — 99214 OFFICE O/P EST MOD 30 MIN: CPT | Mod: S$GLB,,, | Performed by: FAMILY MEDICINE

## 2018-10-26 PROCEDURE — 99999 PR PBB SHADOW E&M-EST. PATIENT-LVL IV: CPT | Mod: PBBFAC,,, | Performed by: FAMILY MEDICINE

## 2018-10-26 PROCEDURE — 3008F BODY MASS INDEX DOCD: CPT | Mod: CPTII,S$GLB,, | Performed by: FAMILY MEDICINE

## 2018-10-26 RX ORDER — AMITRIPTYLINE HYDROCHLORIDE 150 MG/1
150 TABLET ORAL NIGHTLY PRN
Qty: 90 TABLET | Refills: 3 | Status: SHIPPED | OUTPATIENT
Start: 2018-10-26 | End: 2018-11-16 | Stop reason: SDUPTHER

## 2018-10-26 RX ORDER — MONTELUKAST SODIUM 10 MG/1
10 TABLET ORAL NIGHTLY
Qty: 30 TABLET | Refills: 0 | Status: SHIPPED | OUTPATIENT
Start: 2018-10-26 | End: 2018-11-20 | Stop reason: SDUPTHER

## 2018-10-26 RX ORDER — FLUOXETINE HYDROCHLORIDE 40 MG/1
40 CAPSULE ORAL EVERY MORNING
Qty: 90 CAPSULE | Refills: 1 | Status: SHIPPED | OUTPATIENT
Start: 2018-10-26 | End: 2019-04-08 | Stop reason: SDUPTHER

## 2018-10-26 RX ORDER — METHOCARBAMOL 500 MG/1
500 TABLET, FILM COATED ORAL NIGHTLY PRN
Qty: 90 TABLET | Refills: 0 | Status: CANCELLED | OUTPATIENT
Start: 2018-10-26

## 2018-10-26 NOTE — PROGRESS NOTES
Subjective:       Patient ID: Alyce Zurita is a 58 y.o. female.    Chief Complaint: Sinus Problem and Sore Throat    HPI    Acute sinus infection - pt has recurrent sinus issues s/p complete resolution of her last sinus infection 1 month ago. Over the last week, she had issues with nasal congestion, PND, sore throat x 1 week.     She is adherent with zyrtec, flonase.     Carpal tunnel syndrome - L side - pt was unable to afford the surgery, so she is holding it off.    Anxiety - pt states that fluoxetine helps her sxs by about 30%, but she has had increase stress at work, and she is wondering if we cna increase her dose of fluoxetine.          Current Outpatient Medications on File Prior to Visit   Medication Sig Dispense Refill    cholecalciferol, vitamin D3, (VITAMIN D3 ORAL) Take by mouth 2 (two) times daily.      esomeprazole magnesium (NEXIUM ORAL) Take by mouth once daily.      fluticasone (FLONASE) 50 mcg/actuation nasal spray 2 sprays by Each Nare route once daily.      gabapentin (NEURONTIN) 300 MG capsule Take 2 tablet in the morning and 2 tablet nightly 360 capsule 3    glucosamine-chondroitin 500-400 mg tablet Take 1 tablet by mouth once daily.      hydroCHLOROthiazide (MICROZIDE) 12.5 mg capsule Take 1 capsule (12.5 mg total) by mouth once daily. 90 capsule 0    ibuprofen (ADVIL,MOTRIN) 600 MG tablet Take 600 mg by mouth as needed for Pain.      meloxicam (MOBIC) 15 MG tablet Take 1 tablet (15 mg total) by mouth once daily. For knee pain 30 tablet 1    metoprolol tartrate (LOPRESSOR) 25 MG tablet Take 25 mg by mouth. Take 1/2 tablet daily      POTASSIUM ORAL Take 20 mg by mouth as needed.      [DISCONTINUED] amitriptyline (ELAVIL) 150 MG Tab Take 1 tablet (150 mg total) by mouth nightly as needed. 90 tablet 3    [DISCONTINUED] FLUoxetine (PROZAC) 20 MG capsule Take 1 capsule (20 mg total) by mouth every morning. 90 capsule 1    [DISCONTINUED] methocarbamol (ROBAXIN) 500 MG Tab Take 1 tablet  (500 mg total) by mouth nightly as needed. 90 tablet 0     No current facility-administered medications on file prior to visit.        Past Medical History:   Diagnosis Date    Allergy     Arthritis     Carpal tunnel syndrome of left wrist 9/10/2018    GERD (gastroesophageal reflux disease)     Hyperlipemia     Hypertension, uncontrolled 8/17/2018    Kidney problem     Meningitis     3 months old    Tachycardia        Family History   Problem Relation Age of Onset    Arthritis Mother     Glaucoma Mother     Asthma Daughter         reports that  has never smoked. she has never used smokeless tobacco. She reports that she does not drink alcohol.    Review of Systems   Constitutional: Negative for chills and fever.   Cardiovascular: Negative for chest pain and palpitations.   Psychiatric/Behavioral: Negative for suicidal ideas. The patient is nervous/anxious.        Objective:     Vitals:    10/26/18 0834   BP: 128/78   Pulse:    Resp:    Temp:         Physical Exam   Constitutional: She appears well-developed. No distress.   MO   HENT:   Head: Normocephalic and atraumatic.   Eyes: Conjunctivae are normal. No scleral icterus.   Pulmonary/Chest: Effort normal.   Neurological: She is alert.   Skin: She is not diaphoretic.   Psychiatric: She has a normal mood and affect. Her behavior is normal.   Vitals reviewed.      Assessment:       1. Non-seasonal allergic rhinitis due to other allergic trigger    2. Carpal tunnel syndrome of left wrist    3. Anxiety    4. Primary insomnia        Plan:       Alyce was seen today for sinus problem and sore throat.    Diagnoses and all orders for this visit:    Non-seasonal allergic rhinitis due to other allergic trigger  -     montelukast (SINGULAIR) 10 mg tablet; Take 1 tablet (10 mg total) by mouth every evening.  Patient continues to have chronic current allergy symptoms.  I will place her on Singulair in addition to her Flonase and Zyrtec.  This does not improve her  symptoms I will send her to ENT or allergist.    Carpal tunnel syndrome of left wrist  Chronic - stable - patient had the put off carpal tunnel surgery due to the expense.    Anxiety  -     FLUoxetine (PROZAC) 40 MG capsule; Take 1 capsule (40 mg total) by mouth every morning.  Patient states that fluoxetine has improved her symptoms by about 30%.  She would like to try higher dose all advised her to take 2 of the 20 mg capsules that she has and  the 40 mg when she has completed the 20 mg x2 per day.  Primary insomnia  -     amitriptyline (ELAVIL) 150 MG Tab; Take 1 tablet (150 mg total) by mouth nightly as needed.  - Chronic - stable     Pt is doing well on current therapy. Refilled. No side effects noted. Will continue current therapy.    Other orders  -     Cancel: methocarbamol (ROBAXIN) 500 MG Tab; Take 1 tablet (500 mg total) by mouth nightly as needed.  Patient was taking both Robaxin and amitriptyline help her sleep.  I advised her that taking both of these was not advised she opted to continue the amitriptyline instead.          Follow-up in about 3 months (around 1/26/2019) for Hypertension, hypokalemia.        Pt verbalized understanding and agreed with our plan.

## 2018-10-29 ENCOUNTER — HOSPITAL ENCOUNTER (OUTPATIENT)
Dept: RADIOLOGY | Facility: HOSPITAL | Age: 58
Discharge: HOME | End: 2018-10-29
Attending: FAMILY MEDICINE
Payer: COMMERCIAL

## 2018-10-29 DIAGNOSIS — Z12.39 BREAST CANCER SCREENING: ICD-10-CM

## 2018-10-29 PROCEDURE — 77063 BREAST TOMOSYNTHESIS BI: CPT | Mod: TC

## 2018-10-29 PROCEDURE — 77067 SCR MAMMO BI INCL CAD: CPT | Mod: 26,,, | Performed by: RADIOLOGY

## 2018-10-29 PROCEDURE — 77063 BREAST TOMOSYNTHESIS BI: CPT | Mod: 26,,, | Performed by: RADIOLOGY

## 2018-11-14 ENCOUNTER — TELEPHONE (OUTPATIENT)
Dept: FAMILY MEDICINE | Facility: CLINIC | Age: 58
End: 2018-11-14

## 2018-11-14 NOTE — TELEPHONE ENCOUNTER
Patient calling stated she is having pain in middle of leg and spasm between leg. Patient requesting what PCP recommend. Notified patient may need OV to advise of leg. Next opening is tomorrow at 2. Ok override if patient would like to come in? Patient would like to notified PCP spouse surgery went well. Patient next OV is 11/27/18

## 2018-11-14 NOTE — TELEPHONE ENCOUNTER
----- Message from Craig Younger sent at 11/14/2018  1:12 PM CST -----  Contact: self/[phone]  The patient would like to speak to the staff regarding her health.        Thank you

## 2018-11-14 NOTE — TELEPHONE ENCOUNTER
----- Message from Lizbeth Correa sent at 11/14/2018  1:35 PM CST -----  Contact: Self/ [phone]  Pt returning call to office. Thank you.

## 2018-11-16 ENCOUNTER — OFFICE VISIT (OUTPATIENT)
Dept: FAMILY MEDICINE | Facility: CLINIC | Age: 58
End: 2018-11-16
Payer: COMMERCIAL

## 2018-11-16 VITALS
HEART RATE: 86 BPM | HEIGHT: 66 IN | BODY MASS INDEX: 32.17 KG/M2 | OXYGEN SATURATION: 98 % | RESPIRATION RATE: 16 BRPM | WEIGHT: 200.19 LBS | SYSTOLIC BLOOD PRESSURE: 132 MMHG | DIASTOLIC BLOOD PRESSURE: 80 MMHG | TEMPERATURE: 99 F

## 2018-11-16 DIAGNOSIS — Z86.79 H/O SUPRAVENTRICULAR TACHYCARDIA: ICD-10-CM

## 2018-11-16 DIAGNOSIS — F41.9 ANXIETY: ICD-10-CM

## 2018-11-16 DIAGNOSIS — F51.01 PRIMARY INSOMNIA: ICD-10-CM

## 2018-11-16 DIAGNOSIS — M76.32 ILIOTIBIAL BAND SYNDROME OF LEFT SIDE: Primary | ICD-10-CM

## 2018-11-16 DIAGNOSIS — R06.09 DOE (DYSPNEA ON EXERTION): ICD-10-CM

## 2018-11-16 PROCEDURE — 99214 OFFICE O/P EST MOD 30 MIN: CPT | Mod: S$GLB,,, | Performed by: FAMILY MEDICINE

## 2018-11-16 PROCEDURE — 3008F BODY MASS INDEX DOCD: CPT | Mod: CPTII,S$GLB,, | Performed by: FAMILY MEDICINE

## 2018-11-16 PROCEDURE — 99999 PR PBB SHADOW E&M-EST. PATIENT-LVL IV: CPT | Mod: PBBFAC,,, | Performed by: FAMILY MEDICINE

## 2018-11-16 RX ORDER — AMITRIPTYLINE HYDROCHLORIDE 150 MG/1
150 TABLET ORAL NIGHTLY PRN
Qty: 90 TABLET | Refills: 3 | Status: SHIPPED | OUTPATIENT
Start: 2018-11-16 | End: 2019-07-12

## 2018-11-16 RX ORDER — METHOCARBAMOL 500 MG/1
500 TABLET, FILM COATED ORAL 2 TIMES DAILY PRN
Qty: 180 TABLET | Refills: 0 | Status: SHIPPED | OUTPATIENT
Start: 2018-11-16 | End: 2019-01-28 | Stop reason: SDUPTHER

## 2018-11-16 NOTE — PROGRESS NOTES
Subjective:       Patient ID: Alyce Zurita is a 58 y.o. female.    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     Sitting Salvadorean style helps.     Feels like a stabbing pain and is a 6/10.     Anxiety - pt went up to 80mg on fluoxetine and had a period of time where she did not remember what happened for a bout 2 hours.     MOORE - pt has intermittent MOORE when she is climbing stairs x 6 months. She climbs stairs all day long with her job, so this is unusual. She has a h/o of uncontrolled SVT.       Current Outpatient Medications on File Prior to Visit   Medication Sig Dispense Refill    cholecalciferol, vitamin D3, (VITAMIN D3 ORAL) Take by mouth 2 (two) times daily.      esomeprazole magnesium (NEXIUM ORAL) Take by mouth once daily.      FLUoxetine (PROZAC) 40 MG capsule Take 1 capsule (40 mg total) by mouth every morning. 90 capsule 1    fluticasone (FLONASE) 50 mcg/actuation nasal spray 2 sprays by Each Nare route once daily.      gabapentin (NEURONTIN) 300 MG capsule Take 2 tablet in the morning and 2 tablet nightly 360 capsule 3    glucosamine-chondroitin 500-400 mg tablet Take 1 tablet by mouth once daily.      hydroCHLOROthiazide (MICROZIDE) 12.5 mg capsule Take 1 capsule (12.5 mg total) by mouth once daily. 90 capsule 0    ibuprofen (ADVIL,MOTRIN) 600 MG tablet Take 600 mg by mouth as needed for Pain.      meloxicam (MOBIC) 15 MG tablet Take 1 tablet (15 mg total) by mouth once daily. For knee pain 30 tablet 1    metoprolol tartrate (LOPRESSOR) 25 MG tablet Take 25 mg by mouth. Take 1/2 tablet daily      montelukast (SINGULAIR) 10 mg tablet Take 1 tablet (10 mg total) by mouth every evening. 30 tablet 0    POTASSIUM ORAL Take 20 mg by mouth as needed.      [DISCONTINUED] amitriptyline (ELAVIL) 150 MG Tab Take 1 tablet (150 mg total) by  mouth nightly as needed. 90 tablet 3     No current facility-administered medications on file prior to visit.        Past Medical History:   Diagnosis Date    Allergy     Anxiety 11/16/2018    Arthritis     Breast injury     left 10 yrs ago/ hit in chest    Carpal tunnel syndrome of left wrist 9/10/2018    GERD (gastroesophageal reflux disease)     Hyperlipemia     Hypertension, uncontrolled 8/17/2018    Kidney problem     Meningitis     3 months old    Tachycardia        Family History   Problem Relation Age of Onset    Arthritis Mother     Glaucoma Mother     Breast cancer Mother     Asthma Daughter     Breast cancer Maternal Aunt     Breast cancer Maternal Grandmother     Breast cancer Paternal Grandmother     Breast cancer Maternal Aunt         reports that  has never smoked. she has never used smokeless tobacco. She reports that she does not drink alcohol.    Review of Systems   Musculoskeletal: Positive for arthralgias and back pain. Negative for gait problem and joint swelling.   Psychiatric/Behavioral: Negative for sleep disturbance. The patient is not nervous/anxious.        Objective:     Vitals:    11/16/18 1123   BP: 132/80   Pulse: 86   Resp: 16   Temp: 98.7 °F (37.1 °C)        Physical Exam   Constitutional: She appears well-developed. No distress.   O   HENT:   Head: Normocephalic and atraumatic.   Eyes: Conjunctivae are normal. No scleral icterus.   Pulmonary/Chest: Effort normal.   Musculoskeletal:        Left hip: She exhibits tenderness. She exhibits normal range of motion, normal strength, no bony tenderness, no swelling, no crepitus, no deformity and no laceration.        Legs:  Tight IT band   Neurological: She is alert.   Skin: She is not diaphoretic.   Psychiatric: She has a normal mood and affect. Her behavior is normal.   Vitals reviewed.      Assessment:       1. Iliotibial band syndrome of left side    2. MOORE (dyspnea on exertion)    3. H/O supraventricular tachycardia     4. Primary insomnia    5. Anxiety        Plan:       Alyce was seen today for leg pain.    Diagnoses and all orders for this visit:    Iliotibial band syndrome of left side  -     methocarbamol (ROBAXIN) 500 MG Tab; Take 1 tablet (500 mg total) by mouth 2 (two) times daily as needed (pain with L leg).  Above diagnosis best fits patient's presenting symptoms and physical exam.  She does also have some lower back pain which means there could be some radicular symptoms confounding these findings but she definitely had tenderness along the length of the ITB band which would not be expected with radicular symptoms alone.  Patient was advised to continue anti-inflammatories to perform stretches that I showed her and to use a muscle relaxer as needed.      MOORE (dyspnea on exertion)  -     Ambulatory referral to Cardiology  Patient states that she has slowly developed dyspnea on exertion when she is climbing stairs over the last 6 months which occurs intermittently.  She does have a history of SVT and with these symptoms being mention toward the end of the visit I think is best that she given see Cardiology for full evaluation and visit the can be dedicated to this 1 complaint.    H/O supraventricular tachycardia  -     Ambulatory referral to Cardiology    Primary insomnia  -     amitriptyline (ELAVIL) 150 MG Tab; Take 1 tablet (150 mg total) by mouth nightly as needed.  - Chronic - stable     Pt is doing well on current therapy. No side effects noted. Will continue current therapy.    Anxiety  Patient had may have had potential side effects with fluoxetine but it is hard to say.  She has cut her dose back from 80-40 mg but feels that her symptoms are well controlled.  Will continue her on the 40 mg dose and she will let me know if she develops any new or worsening symptoms.          Follow-up for f/u as scheduled.        Pt verbalized understanding and agreed with our plan.

## 2018-11-19 ENCOUNTER — PATIENT MESSAGE (OUTPATIENT)
Dept: FAMILY MEDICINE | Facility: CLINIC | Age: 58
End: 2018-11-19

## 2018-11-19 DIAGNOSIS — I10 HYPERTENSION, UNCONTROLLED: ICD-10-CM

## 2018-11-20 DIAGNOSIS — M25.562 ACUTE PAIN OF LEFT KNEE: ICD-10-CM

## 2018-11-20 DIAGNOSIS — J30.89 NON-SEASONAL ALLERGIC RHINITIS DUE TO OTHER ALLERGIC TRIGGER: ICD-10-CM

## 2018-11-20 DIAGNOSIS — I10 HYPERTENSION, UNCONTROLLED: ICD-10-CM

## 2018-11-20 DIAGNOSIS — M76.32 ILIOTIBIAL BAND SYNDROME OF LEFT SIDE: ICD-10-CM

## 2018-11-20 RX ORDER — HYDROCHLOROTHIAZIDE 12.5 MG/1
12.5 CAPSULE ORAL DAILY
Qty: 90 CAPSULE | Refills: 1 | Status: SHIPPED | OUTPATIENT
Start: 2018-11-20 | End: 2019-02-04

## 2018-11-20 RX ORDER — MONTELUKAST SODIUM 10 MG/1
10 TABLET ORAL NIGHTLY
Qty: 30 TABLET | Refills: 0 | Status: SHIPPED | OUTPATIENT
Start: 2018-11-20 | End: 2018-12-20

## 2018-11-20 RX ORDER — HYDROCHLOROTHIAZIDE 12.5 MG/1
12.5 CAPSULE ORAL DAILY
Qty: 90 CAPSULE | Refills: 0 | Status: SHIPPED | OUTPATIENT
Start: 2018-11-20 | End: 2018-11-27 | Stop reason: SDUPTHER

## 2018-11-20 RX ORDER — METHOCARBAMOL 500 MG/1
500 TABLET, FILM COATED ORAL 2 TIMES DAILY PRN
Qty: 180 TABLET | Refills: 0 | Status: CANCELLED | OUTPATIENT
Start: 2018-11-20 | End: 2018-11-30

## 2018-11-20 RX ORDER — METOPROLOL TARTRATE 25 MG/1
25 TABLET, FILM COATED ORAL DAILY
Qty: 90 TABLET | Refills: 1 | Status: SHIPPED | OUTPATIENT
Start: 2018-11-20 | End: 2019-06-07 | Stop reason: SDUPTHER

## 2018-11-20 RX ORDER — MELOXICAM 15 MG/1
15 TABLET ORAL DAILY
Qty: 30 TABLET | Refills: 1 | Status: SHIPPED | OUTPATIENT
Start: 2018-11-20 | End: 2019-02-20 | Stop reason: SDUPTHER

## 2018-11-20 NOTE — TELEPHONE ENCOUNTER
Pt states was told by pharmacy that they did not receive any prescriptions; I placed call to pharmacy and was told prescriptions are ready for ; pt also needs hctz and metoprolol refilled

## 2018-11-27 ENCOUNTER — LAB VISIT (OUTPATIENT)
Dept: LAB | Facility: HOSPITAL | Age: 58
End: 2018-11-27
Attending: FAMILY MEDICINE
Payer: COMMERCIAL

## 2018-11-27 ENCOUNTER — OFFICE VISIT (OUTPATIENT)
Dept: FAMILY MEDICINE | Facility: CLINIC | Age: 58
End: 2018-11-27
Payer: COMMERCIAL

## 2018-11-27 VITALS
HEART RATE: 82 BPM | SYSTOLIC BLOOD PRESSURE: 130 MMHG | RESPIRATION RATE: 16 BRPM | DIASTOLIC BLOOD PRESSURE: 80 MMHG | WEIGHT: 201.06 LBS | OXYGEN SATURATION: 96 % | HEIGHT: 66 IN | TEMPERATURE: 99 F | BODY MASS INDEX: 32.31 KG/M2

## 2018-11-27 DIAGNOSIS — M76.32 IT BAND SYNDROME, LEFT: ICD-10-CM

## 2018-11-27 DIAGNOSIS — R68.89 ABNORMAL WEIGHT: ICD-10-CM

## 2018-11-27 DIAGNOSIS — M25.562 ACUTE PAIN OF LEFT KNEE: Primary | ICD-10-CM

## 2018-11-27 LAB
ALBUMIN SERPL BCP-MCNC: 3.6 G/DL
ALP SERPL-CCNC: 81 U/L
ALT SERPL W/O P-5'-P-CCNC: 19 U/L
ANION GAP SERPL CALC-SCNC: 9 MMOL/L
AST SERPL-CCNC: 20 U/L
BILIRUB SERPL-MCNC: 0.3 MG/DL
BUN SERPL-MCNC: 12 MG/DL
CALCIUM SERPL-MCNC: 9.3 MG/DL
CHLORIDE SERPL-SCNC: 103 MMOL/L
CO2 SERPL-SCNC: 30 MMOL/L
CREAT SERPL-MCNC: 0.8 MG/DL
EST. GFR  (AFRICAN AMERICAN): >60 ML/MIN/1.73 M^2
EST. GFR  (NON AFRICAN AMERICAN): >60 ML/MIN/1.73 M^2
GLUCOSE SERPL-MCNC: 82 MG/DL
POTASSIUM SERPL-SCNC: 3.3 MMOL/L
PROT SERPL-MCNC: 7.2 G/DL
SODIUM SERPL-SCNC: 142 MMOL/L
TSH SERPL DL<=0.005 MIU/L-ACNC: 1.32 UIU/ML

## 2018-11-27 PROCEDURE — 99999 PR PBB SHADOW E&M-EST. PATIENT-LVL III: CPT | Mod: PBBFAC,,, | Performed by: FAMILY MEDICINE

## 2018-11-27 PROCEDURE — 20610 DRAIN/INJ JOINT/BURSA W/O US: CPT | Mod: LT,S$GLB,, | Performed by: FAMILY MEDICINE

## 2018-11-27 PROCEDURE — 36415 COLL VENOUS BLD VENIPUNCTURE: CPT | Mod: PO

## 2018-11-27 PROCEDURE — 99214 OFFICE O/P EST MOD 30 MIN: CPT | Mod: 25,S$GLB,, | Performed by: FAMILY MEDICINE

## 2018-11-27 PROCEDURE — 3008F BODY MASS INDEX DOCD: CPT | Mod: CPTII,S$GLB,, | Performed by: FAMILY MEDICINE

## 2018-11-27 PROCEDURE — 84443 ASSAY THYROID STIM HORMONE: CPT

## 2018-11-27 PROCEDURE — 80053 COMPREHEN METABOLIC PANEL: CPT

## 2018-11-27 RX ORDER — TRIAMCINOLONE ACETONIDE 40 MG/ML
40 INJECTION, SUSPENSION INTRA-ARTICULAR; INTRAMUSCULAR
Status: DISCONTINUED | OUTPATIENT
Start: 2018-11-27 | End: 2018-11-27 | Stop reason: HOSPADM

## 2018-11-27 RX ADMIN — TRIAMCINOLONE ACETONIDE 40 MG: 40 INJECTION, SUSPENSION INTRA-ARTICULAR; INTRAMUSCULAR at 05:11

## 2018-11-27 NOTE — PROCEDURES
Large Joint Aspiration/Injection: L knee  Date/Time: 11/27/2018 5:49 PM  Performed by: Bryson Davila MD  Authorized by: Bryson Davila MD     Consent Done?:  Yes (Verbal)  Indications:  Pain  Procedure site marked: Yes    Timeout: Prior to procedure the correct patient, procedure, and site was verified      Location:  Knee  Site:  L knee  Prep: Patient was prepped and draped in usual sterile fashion    Needle size:  22 G  Approach:  Medial  Medications:  40 mg triamcinolone acetonide 40 mg/mL  Patient tolerance:  Patient tolerated the procedure well with no immediate complications

## 2018-11-27 NOTE — PROGRESS NOTES
Subjective:       Patient ID: Alyce Zurita is a 58 y.o. female.    Chief Complaint: Leg Pain (follow up )    HPI    L IT syndrome - stretches have been very helpful to reduce her hip pain.     Essential tremor - chronc - pt is worried about her hand shaking as she is worried that she may be developing alzheiemrs is in her family.     L knee pain- patient's L-sided knee pain can continues to bother her despite the resolution of other pains on the left side secondary to her ITP syndrome.  This is significantly reducing her quality of life and inhibiting her ability to work. After discussing the risks and benefits patient opted for injection today.   Current Outpatient Medications on File Prior to Visit   Medication Sig Dispense Refill    amitriptyline (ELAVIL) 150 MG Tab Take 1 tablet (150 mg total) by mouth nightly as needed. 90 tablet 3    cholecalciferol, vitamin D3, (VITAMIN D3 ORAL) Take by mouth 2 (two) times daily.      esomeprazole magnesium (NEXIUM ORAL) Take by mouth once daily.      FLUoxetine (PROZAC) 40 MG capsule Take 1 capsule (40 mg total) by mouth every morning. (Patient taking differently: Take 20 mg by mouth every morning. ) 90 capsule 1    fluticasone (FLONASE) 50 mcg/actuation nasal spray 2 sprays by Each Nare route once daily.      gabapentin (NEURONTIN) 300 MG capsule Take 2 tablet in the morning and 2 tablet nightly 360 capsule 3    glucosamine-chondroitin 500-400 mg tablet Take 1 tablet by mouth once daily.      hydroCHLOROthiazide (MICROZIDE) 12.5 mg capsule Take 1 capsule (12.5 mg total) by mouth once daily. 90 capsule 1    ibuprofen (ADVIL,MOTRIN) 600 MG tablet Take 600 mg by mouth as needed for Pain.      meloxicam (MOBIC) 15 MG tablet Take 1 tablet (15 mg total) by mouth once daily. For knee pain 30 tablet 1    metoprolol tartrate (LOPRESSOR) 25 MG tablet Take 1 tablet (25 mg total) by mouth once daily. 90 tablet 1    montelukast (SINGULAIR) 10 mg tablet Take 1 tablet (10 mg  total) by mouth every evening. 30 tablet 0    POTASSIUM ORAL Take 20 mg by mouth as needed.      [DISCONTINUED] hydroCHLOROthiazide (MICROZIDE) 12.5 mg capsule Take 1 capsule (12.5 mg total) by mouth once daily. 90 capsule 0    [] methocarbamol (ROBAXIN) 500 MG Tab Take 1 tablet (500 mg total) by mouth 2 (two) times daily as needed (pain with L leg). 180 tablet 0     No current facility-administered medications on file prior to visit.        Past Medical History:   Diagnosis Date    Allergy     Anxiety 2018    Arthritis     Breast injury     left 10 yrs ago/ hit in chest    Carpal tunnel syndrome of left wrist 9/10/2018    GERD (gastroesophageal reflux disease)     Hyperlipemia     Hypertension, uncontrolled 2018    Kidney problem     Meningitis     3 months old    Tachycardia        Family History   Problem Relation Age of Onset    Arthritis Mother     Glaucoma Mother     Breast cancer Mother     Asthma Daughter     Breast cancer Maternal Aunt     Breast cancer Maternal Grandmother     Breast cancer Paternal Grandmother     Breast cancer Maternal Aunt         reports that  has never smoked. she has never used smokeless tobacco. She reports that she does not drink alcohol.    Review of Systems   Constitutional: Negative for chills and fever.   Neurological: Positive for tremors. Negative for dizziness, facial asymmetry, light-headedness and numbness.       Objective:     Vitals:    18 1303   BP: 130/80   Pulse: 82   Resp: 16   Temp: 99.1 °F (37.3 °C)        Physical Exam   Constitutional: She appears well-developed. No distress.   MO   HENT:   Head: Normocephalic and atraumatic.   Eyes: Conjunctivae are normal. No scleral icterus.   Pulmonary/Chest: Effort normal.   Neurological: She is alert.   Skin: She is not diaphoretic.   Psychiatric: She has a normal mood and affect. Her behavior is normal.   Vitals reviewed.      Assessment:       1. Acute pain of left knee    2.  Abnormal weight    3. It band syndrome, left        Plan:       Alyce was seen today for leg pain.    Diagnoses and all orders for this visit:    Acute pain of left knee  As above, patient's symptoms did not improve with supportive care and a home exercise program.  After discussing the risks and benefits patient opted for knee injection which she tolerated well.  Patient had immediate pain relief after injection.    Abnormal weight  -     TSH; Future  -     Comprehensive metabolic panel; Future  Patient has struggle with her weight for years.  She is very active and states that she is not a big sweet eater and avoids other, but falls.  Patient and I will continue this discussion but she believes that there may be thyroid abnormalities to blame.  We will check this today.    It band syndrome, left  Improved with HEP!          Follow-up in about 6 weeks (around 1/8/2019) for weight and dietary changes, L knee pain, anxiety.        Pt verbalized understanding and agreed with our plan.

## 2018-11-29 DIAGNOSIS — J30.89 NON-SEASONAL ALLERGIC RHINITIS DUE TO OTHER ALLERGIC TRIGGER: ICD-10-CM

## 2018-11-30 RX ORDER — MONTELUKAST SODIUM 10 MG/1
TABLET ORAL
Qty: 90 TABLET | Refills: 1 | Status: SHIPPED | OUTPATIENT
Start: 2018-11-30 | End: 2019-07-31 | Stop reason: SDUPTHER

## 2018-12-05 DIAGNOSIS — M25.562 ACUTE PAIN OF LEFT KNEE: ICD-10-CM

## 2018-12-05 DIAGNOSIS — J30.89 NON-SEASONAL ALLERGIC RHINITIS DUE TO OTHER ALLERGIC TRIGGER: ICD-10-CM

## 2018-12-05 RX ORDER — MONTELUKAST SODIUM 10 MG/1
10 TABLET ORAL NIGHTLY
Qty: 90 TABLET | Refills: 1 | Status: CANCELLED | OUTPATIENT
Start: 2018-12-05

## 2018-12-05 RX ORDER — MELOXICAM 15 MG/1
15 TABLET ORAL DAILY
Qty: 30 TABLET | Refills: 1 | Status: CANCELLED | OUTPATIENT
Start: 2018-12-05

## 2018-12-13 ENCOUNTER — TELEPHONE (OUTPATIENT)
Dept: FAMILY MEDICINE | Facility: CLINIC | Age: 58
End: 2018-12-13

## 2018-12-13 DIAGNOSIS — M54.2 CHRONIC NECK PAIN: ICD-10-CM

## 2018-12-13 DIAGNOSIS — G89.29 CHRONIC NECK PAIN: ICD-10-CM

## 2018-12-13 RX ORDER — POTASSIUM CHLORIDE 20 MEQ/1
20 TABLET, EXTENDED RELEASE ORAL 2 TIMES DAILY
Qty: 30 TABLET | Refills: 1 | Status: SHIPPED | OUTPATIENT
Start: 2018-12-13 | End: 2019-01-03 | Stop reason: SDUPTHER

## 2018-12-13 RX ORDER — GABAPENTIN 300 MG/1
CAPSULE ORAL
Qty: 360 CAPSULE | Refills: 3 | Status: SHIPPED | OUTPATIENT
Start: 2018-12-13 | End: 2019-07-12 | Stop reason: SDUPTHER

## 2018-12-13 NOTE — TELEPHONE ENCOUNTER
----- Message from Niru Santos sent at 12/13/2018  8:35 AM CST -----  Contact: Self   Patient need a refill on her medication. Please call patient at [phone]      [facility] [place] [address] [address]

## 2019-01-03 RX ORDER — POTASSIUM CHLORIDE 20 MEQ/1
20 TABLET, EXTENDED RELEASE ORAL 2 TIMES DAILY
Qty: 90 TABLET | Refills: 0 | Status: SHIPPED | OUTPATIENT
Start: 2019-01-03 | End: 2019-05-08 | Stop reason: SDUPTHER

## 2019-01-03 NOTE — TELEPHONE ENCOUNTER
----- Message from Francine Montesinos sent at 1/3/2019  2:15 PM CST -----  Contact: self [phone]  Requesting a refill on potassium chloride SA (K-DUR,KLOR-CON) 20 MEQ tablet  .  [id number] - [place], [place] - [address] [place] [address]  Phone: [phone] Fax: [phone]

## 2019-01-18 ENCOUNTER — OFFICE VISIT (OUTPATIENT)
Dept: FAMILY MEDICINE | Facility: CLINIC | Age: 59
End: 2019-01-18
Payer: COMMERCIAL

## 2019-01-18 VITALS
SYSTOLIC BLOOD PRESSURE: 108 MMHG | OXYGEN SATURATION: 95 % | DIASTOLIC BLOOD PRESSURE: 70 MMHG | TEMPERATURE: 98 F | BODY MASS INDEX: 32.88 KG/M2 | HEART RATE: 84 BPM | RESPIRATION RATE: 20 BRPM | WEIGHT: 204.56 LBS | HEIGHT: 66 IN

## 2019-01-18 DIAGNOSIS — R07.81 RIB PAIN ON RIGHT SIDE: ICD-10-CM

## 2019-01-18 DIAGNOSIS — M79.641 PAIN OF RIGHT HAND: ICD-10-CM

## 2019-01-18 DIAGNOSIS — I10 HYPERTENSION, WELL CONTROLLED: Primary | ICD-10-CM

## 2019-01-18 DIAGNOSIS — M25.562 PAIN IN LATERAL PORTION OF LEFT KNEE: ICD-10-CM

## 2019-01-18 DIAGNOSIS — J01.00 ACUTE NON-RECURRENT MAXILLARY SINUSITIS: ICD-10-CM

## 2019-01-18 PROCEDURE — 99999 PR PBB SHADOW E&M-EST. PATIENT-LVL IV: CPT | Mod: PBBFAC,,, | Performed by: FAMILY MEDICINE

## 2019-01-18 PROCEDURE — 99214 OFFICE O/P EST MOD 30 MIN: CPT | Mod: S$GLB,,, | Performed by: FAMILY MEDICINE

## 2019-01-18 PROCEDURE — 99214 PR OFFICE/OUTPT VISIT, EST, LEVL IV, 30-39 MIN: ICD-10-PCS | Mod: S$GLB,,, | Performed by: FAMILY MEDICINE

## 2019-01-18 PROCEDURE — 3074F PR MOST RECENT SYSTOLIC BLOOD PRESSURE < 130 MM HG: ICD-10-PCS | Mod: CPTII,S$GLB,, | Performed by: FAMILY MEDICINE

## 2019-01-18 PROCEDURE — 3008F BODY MASS INDEX DOCD: CPT | Mod: CPTII,S$GLB,, | Performed by: FAMILY MEDICINE

## 2019-01-18 PROCEDURE — 99999 PR PBB SHADOW E&M-EST. PATIENT-LVL IV: ICD-10-PCS | Mod: PBBFAC,,, | Performed by: FAMILY MEDICINE

## 2019-01-18 PROCEDURE — 3078F PR MOST RECENT DIASTOLIC BLOOD PRESSURE < 80 MM HG: ICD-10-PCS | Mod: CPTII,S$GLB,, | Performed by: FAMILY MEDICINE

## 2019-01-18 PROCEDURE — 3074F SYST BP LT 130 MM HG: CPT | Mod: CPTII,S$GLB,, | Performed by: FAMILY MEDICINE

## 2019-01-18 PROCEDURE — 3008F PR BODY MASS INDEX (BMI) DOCUMENTED: ICD-10-PCS | Mod: CPTII,S$GLB,, | Performed by: FAMILY MEDICINE

## 2019-01-18 PROCEDURE — 3078F DIAST BP <80 MM HG: CPT | Mod: CPTII,S$GLB,, | Performed by: FAMILY MEDICINE

## 2019-01-18 RX ORDER — FLUCONAZOLE 150 MG/1
150 TABLET ORAL ONCE
Qty: 1 TABLET | Refills: 0 | Status: SHIPPED | OUTPATIENT
Start: 2019-01-18 | End: 2019-01-18

## 2019-01-18 RX ORDER — AMOXICILLIN AND CLAVULANATE POTASSIUM 875; 125 MG/1; MG/1
1 TABLET, FILM COATED ORAL 2 TIMES DAILY
Qty: 14 TABLET | Refills: 0 | Status: SHIPPED | OUTPATIENT
Start: 2019-01-18 | End: 2019-01-25

## 2019-01-18 RX ORDER — DICLOFENAC SODIUM 10 MG/G
2 GEL TOPICAL 4 TIMES DAILY
Qty: 100 G | Refills: 4 | Status: SHIPPED | OUTPATIENT
Start: 2019-01-18 | End: 2019-10-11

## 2019-01-18 NOTE — PROGRESS NOTES
"Subjective:       Patient ID: Alyce Zurita is a 58 y.o. female.    Chief Complaint: Knee Pain (follow up ); Hypertension (follow up ); Weight Gain (follow up ); and Sinus Problem (sore throat, and cough past 3 weeks )    HPI    Sinus drainage onset 3 weeks ago of sinus congestion, PND, dry cough, and sore throat. Some chills, no fevers.   Pt is on Flonase and singular without much benefit.     Htn- bp under good control. She is adherent with meds but is having some dry mouth.     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 Pain worse with deep inspiration. Pt suffers from chronic intermittent "pleurisy". No otc therapy tried.     Knee pain - resolved after injection! She is very pleased.     R hand pain - Over 1st mtp primarily - onset 6 months ago but has been worsening recently. Worsens with activity.            Current Outpatient Medications on File Prior to Visit   Medication Sig Dispense Refill    amitriptyline (ELAVIL) 150 MG Tab Take 1 tablet (150 mg total) by mouth nightly as needed. 90 tablet 3    cholecalciferol, vitamin D3, (VITAMIN D3 ORAL) Take by mouth 2 (two) times daily.      esomeprazole magnesium (NEXIUM ORAL) Take by mouth once daily.      FLUoxetine (PROZAC) 40 MG capsule Take 1 capsule (40 mg total) by mouth every morning. (Patient taking differently: Take 20 mg by mouth every morning. ) 90 capsule 1    fluticasone (FLONASE) 50 mcg/actuation nasal spray 2 sprays by Each Nare route once daily.      gabapentin (NEURONTIN) 300 MG capsule Take 2 tablet in the morning and 2 tablet nightly 360 capsule 3    glucosamine-chondroitin 500-400 mg tablet Take 1 tablet by mouth once daily.      hydroCHLOROthiazide (MICROZIDE) 12.5 mg capsule Take 1 capsule (12.5 mg total) by mouth once daily. 90 capsule 1    ibuprofen (ADVIL,MOTRIN) 600 MG tablet Take 600 mg by mouth as needed for Pain.      meloxicam (MOBIC) 15 MG tablet Take 1 tablet (15 mg total) " by mouth once daily. For knee pain 30 tablet 1    metoprolol tartrate (LOPRESSOR) 25 MG tablet Take 1 tablet (25 mg total) by mouth once daily. 90 tablet 1    montelukast (SINGULAIR) 10 mg tablet TAKE 1 TABLET BY MOUTH ONCE DAILY IN THE EVENING 90 tablet 1    potassium chloride SA (K-DUR,KLOR-CON) 20 MEQ tablet Take 1 tablet (20 mEq total) by mouth 2 (two) times daily. 90 tablet 0    [DISCONTINUED] POTASSIUM ORAL Take 20 mg by mouth as needed.       No current facility-administered medications on file prior to visit.        Past Medical History:   Diagnosis Date    Allergy     Anxiety 11/16/2018    Arthritis     Breast injury     left 10 yrs ago/ hit in chest    Carpal tunnel syndrome of left wrist 9/10/2018    GERD (gastroesophageal reflux disease)     Hyperlipemia     Hypertension, uncontrolled 8/17/2018    Kidney problem     Meningitis     3 months old    Tachycardia        Family History   Problem Relation Age of Onset    Arthritis Mother     Glaucoma Mother     Breast cancer Mother     Asthma Daughter     Breast cancer Maternal Aunt     Breast cancer Maternal Grandmother     Breast cancer Paternal Grandmother     Breast cancer Maternal Aunt         reports that  has never smoked. she has never used smokeless tobacco. She reports that she does not drink alcohol.    Review of Systems   Constitutional: Positive for chills. Negative for fever.   Musculoskeletal: Positive for arthralgias and myalgias.       Objective:     Vitals:    01/18/19 0857   BP: 108/70   Pulse: 84   Resp: 20   Temp: 98.4 °F (36.9 °C)        Physical Exam   Constitutional: She appears well-developed. No distress.   HENT:   Head: Normocephalic and atraumatic.   Nose: Right sinus exhibits maxillary sinus tenderness. Right sinus exhibits no frontal sinus tenderness. Left sinus exhibits maxillary sinus tenderness. Left sinus exhibits no frontal sinus tenderness.   Eyes: Conjunctivae are normal. No scleral icterus.    Pulmonary/Chest: Effort normal.   Neurological: She is alert.   Skin: She is not diaphoretic.   Psychiatric: She has a normal mood and affect. Her behavior is normal.   Vitals reviewed.      Assessment:       1. Hypertension, well controlled    2. Pain of right hand    3. Rib pain on right side    4. Pain in lateral portion of left knee    5. Acute non-recurrent maxillary sinusitis        Plan:       Alyce was seen today for knee pain, hypertension, weight gain and sinus problem.    Diagnoses and all orders for this visit:    Hypertension, well controlled  - Chronic - stable     Pt is doing well on current therapy. No side effects noted. Will continue current therapy.    Pain of right hand  From primary OA - will try Voltaren and epson salt soaks + activity modification.    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    Pain in lateral portion of left knee  Resolved s/p injection!    Acute non-recurrent maxillary sinusitis  -     amoxicillin-clavulanate 875-125mg (AUGMENTIN) 875-125 mg per tablet; Take 1 tablet by mouth 2 (two) times daily. for 7 days  -     fluconazole (DIFLUCAN) 150 MG Tab; Take 1 tablet (150 mg total) by mouth once. for 1 dose  Pts sxs and pex suggest acute sinusitis. Duration of illness is greater than 10 days. Will treat as above. Pt to call back or notify me if sxs worsen. Rec'd pt to eat yogurt daily If appropriate.    Other orders  -     diclofenac sodium (VOLTAREN) 1 % Gel; Apply 2 g topically 4 (four) times daily.            Follow-up in about 6 weeks (around 3/1/2019).        Pt verbalized understanding and agreed with our plan.

## 2019-01-21 ENCOUNTER — TELEPHONE (OUTPATIENT)
Dept: FAMILY MEDICINE | Facility: CLINIC | Age: 59
End: 2019-01-21

## 2019-01-23 ENCOUNTER — OFFICE VISIT (OUTPATIENT)
Dept: CARDIOLOGY | Facility: CLINIC | Age: 59
End: 2019-01-23
Payer: COMMERCIAL

## 2019-01-23 VITALS
DIASTOLIC BLOOD PRESSURE: 64 MMHG | BODY MASS INDEX: 33.17 KG/M2 | HEART RATE: 83 BPM | RESPIRATION RATE: 15 BRPM | OXYGEN SATURATION: 98 % | WEIGHT: 206.38 LBS | HEIGHT: 66 IN | SYSTOLIC BLOOD PRESSURE: 126 MMHG

## 2019-01-23 DIAGNOSIS — R07.9 CHEST PAIN, UNSPECIFIED TYPE: ICD-10-CM

## 2019-01-23 DIAGNOSIS — R06.09 DOE (DYSPNEA ON EXERTION): Primary | ICD-10-CM

## 2019-01-23 DIAGNOSIS — R94.31 ABNORMAL EKG: ICD-10-CM

## 2019-01-23 DIAGNOSIS — I47.19 AVNRT (AV NODAL RE-ENTRY TACHYCARDIA): ICD-10-CM

## 2019-01-23 DIAGNOSIS — I10 ESSENTIAL HYPERTENSION: ICD-10-CM

## 2019-01-23 DIAGNOSIS — R06.02 SOB (SHORTNESS OF BREATH): ICD-10-CM

## 2019-01-23 DIAGNOSIS — E66.9 NON MORBID OBESITY, UNSPECIFIED OBESITY TYPE: ICD-10-CM

## 2019-01-23 PROCEDURE — 99244 OFF/OP CNSLTJ NEW/EST MOD 40: CPT | Mod: S$GLB,,, | Performed by: INTERNAL MEDICINE

## 2019-01-23 PROCEDURE — 93000 ELECTROCARDIOGRAM COMPLETE: CPT | Mod: S$GLB,,, | Performed by: INTERNAL MEDICINE

## 2019-01-23 PROCEDURE — 93000 EKG 12-LEAD: ICD-10-PCS | Mod: S$GLB,,, | Performed by: INTERNAL MEDICINE

## 2019-01-23 PROCEDURE — 99244 PR OFFICE CONSULTATION,LEVEL IV: ICD-10-PCS | Mod: S$GLB,,, | Performed by: INTERNAL MEDICINE

## 2019-01-23 PROCEDURE — 99999 PR PBB SHADOW E&M-EST. PATIENT-LVL III: CPT | Mod: PBBFAC,,, | Performed by: INTERNAL MEDICINE

## 2019-01-23 PROCEDURE — 99999 PR PBB SHADOW E&M-EST. PATIENT-LVL III: ICD-10-PCS | Mod: PBBFAC,,, | Performed by: INTERNAL MEDICINE

## 2019-01-23 NOTE — LETTER
January 23, 2019      Bryson Davila MD  [address] [facility] [place] [address]           [facility] - Cardiology  [address] [facility] [address]  [place] [address]  Phone: [phone]          Patient: Alyce Zurita   MR Number: [id number]   YOB: 1960   Date of Visit: 1/23/2019       Dear Dr. Bryson Davila:    Thank you for referring Alyce Zurita to me for evaluation. Attached you will find relevant portions of my assessment and plan of care.    If you have questions, please do not hesitate to call me. I look forward to following Alyce Zurita along with you.    Sincerely,    Chaitanya Angela MD    Enclosure  CC:  No Recipients    If you would like to receive this communication electronically, please contact [email or web address] or [phone] to request more information on [employer] access.    For providers and/or their staff who would like to refer a patient to [facility], please contact us through our one-stop-shop provider referral line, [facility], at [phone].    If you feel you have received this communication in error or would no longer like to receive these types of communications, please e-mail [email or web address]

## 2019-01-23 NOTE — PROGRESS NOTES
CARDIOVASCULAR CONSULTATION    REASON FOR CONSULT:   Alyce Zurita is a 58 y.o. female who presents for eval of MOORE.    Req/PCP: Giovanni  HISTORY OF PRESENT ILLNESS:   The patient is a very pleasant 58-year-old  woman referred at the request of her primary care physician for evaluation of dyspnea on exertion and atypical chest pain.  The patient describes progressive shortness of breath with activity, as well as right-sided inframammary pain which she relates to her under wire.  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  She otherwise has had no lightheadedness, dizziness, or syncope.  There has been no PND, orthopnea, or lower extremity edema.  She denies melena, hematuria, or claudicant symptoms.    Family history is notable for mother with myocardial infarction in her 60s.    The patient is a nonsmoker.    CARDIOVASCULAR HISTORY:   AVNRT s/p ablation 2/2017 ([facility])    PAST MEDICAL HISTORY:     Past Medical History:   Diagnosis Date    Allergy     Anxiety 11/16/2018    Arthritis     Breast injury     left 10 yrs ago/ hit in chest    Carpal tunnel syndrome of left wrist 9/10/2018    GERD (gastroesophageal reflux disease)     Hyperlipemia     Hypertension, uncontrolled 8/17/2018    Kidney problem     Meningitis     3 months old    Tachycardia        PAST SURGICAL HISTORY:     Past Surgical History:   Procedure Laterality Date    APPENDECTOMY      Heart Procedure       HYSTERECTOMY  1993    KIDNEY SURGERY      OOPHORECTOMY Bilateral 1993       ALLERGIES AND MEDICATION:     Review of patient's allergies indicates:   Allergen Reactions    Rofecoxib Nausea And Vomiting    Other omega-3s Nausea And Vomiting     chlorine        Medication List           Accurate as of 1/23/19  1:22 PM. If you have any questions, ask your nurse or doctor.               CHANGE how you take these medications     FLUoxetine 40 MG capsule  Take 1 capsule (40 mg total) by mouth every morning.  What changed:  how much to take        CONTINUE taking these medications    amitriptyline 150 MG Tab  Commonly known as:  ELAVIL  Take 1 tablet (150 mg total) by mouth nightly as needed.     amoxicillin-clavulanate 875-125mg 875-125 mg per tablet  Commonly known as:  AUGMENTIN  Take 1 tablet by mouth 2 (two) times daily. for 7 days     diclofenac sodium 1 % Gel  Commonly known as:  VOLTAREN  Apply 2 g topically 4 (four) times daily.     fluticasone 50 mcg/actuation nasal spray  Commonly known as:  FLONASE     gabapentin 300 MG capsule  Commonly known as:  NEURONTIN  Take 2 tablet in the morning and 2 tablet nightly     glucosamine-chondroitin 500-400 mg tablet     hydroCHLOROthiazide 12.5 mg capsule  Commonly known as:  MICROZIDE  Take 1 capsule (12.5 mg total) by mouth once daily.     ibuprofen 600 MG tablet  Commonly known as:  ADVIL,MOTRIN     meloxicam 15 MG tablet  Commonly known as:  MOBIC  Take 1 tablet (15 mg total) by mouth once daily. For knee pain     metoprolol tartrate 25 MG tablet  Commonly known as:  LOPRESSOR  Take 1 tablet (25 mg total) by mouth once daily.     montelukast 10 mg tablet  Commonly known as:  SINGULAIR  TAKE 1 TABLET BY MOUTH ONCE DAILY IN THE EVENING     NEXIUM ORAL     potassium chloride SA 20 MEQ tablet  Commonly known as:  K-DUR,KLOR-CON  Take 1 tablet (20 mEq total) by mouth 2 (two) times daily.     VITAMIN D3 ORAL            SOCIAL HISTORY:     Social History     Socioeconomic History    Marital status:      Spouse name: Not on file    Number of children: Not on file    Years of education: Not on file    Highest education level: Not on file   Social Needs    Financial resource strain: Not on file    Food insecurity - worry: Not on file    Food insecurity - inability: Not on file    Transportation needs - medical: Not on file    Transportation needs - non-medical: Not on file  "  Occupational History    Not on file   Tobacco Use    Smoking status: Never Smoker    Smokeless tobacco: Never Used   Substance and Sexual Activity    Alcohol use: No    Drug use: Not on file    Sexual activity: Yes     Partners: Male     Birth control/protection: None     Comment: 7/20/18  with same partner for 40 years    Other Topics Concern    Not on file   Social History Narrative    Not on file       FAMILY HISTORY:     Family History   Problem Relation Age of Onset    Arthritis Mother     Glaucoma Mother     Breast cancer Mother     Asthma Daughter     Breast cancer Maternal Aunt     Breast cancer Maternal Grandmother     Breast cancer Paternal Grandmother     Breast cancer Maternal Aunt        REVIEW OF SYSTEMS:   Review of Systems   Constitutional: Negative for chills, diaphoresis and fever.   HENT: Negative for nosebleeds.    Eyes: Negative for blurred vision, double vision and photophobia.   Respiratory: Positive for shortness of breath. Negative for hemoptysis and wheezing.    Cardiovascular: Positive for chest pain and palpitations. Negative for orthopnea, claudication, leg swelling and PND.   Gastrointestinal: Negative for abdominal pain, blood in stool, heartburn, melena, nausea and vomiting.   Genitourinary: Negative for flank pain and hematuria.   Musculoskeletal: Negative for falls, myalgias and neck pain.   Skin: Negative for rash.   Neurological: Negative for dizziness, seizures, loss of consciousness, weakness and headaches.   Endo/Heme/Allergies: Negative for polydipsia. Does not bruise/bleed easily.   Psychiatric/Behavioral: Negative for depression and memory loss. The patient is not nervous/anxious.        PHYSICAL EXAM:     Vitals:    01/23/19 1301   BP: 126/64   Pulse: 83   Resp: 15    Body mass index is 33.31 kg/m².  Weight: 93.6 kg (206 lb 5.6 oz)   Height: 5' 6" (167.6 cm)     Physical Exam   Constitutional: She is oriented to person, place, and time. She appears " well-developed and well-nourished. She is cooperative.  Non-toxic appearance. No distress.   HENT:   Head: Normocephalic and atraumatic.   Eyes: Conjunctivae and EOM are normal. Pupils are equal, round, and reactive to light. No scleral icterus.   Neck: Trachea normal and normal range of motion. Neck supple. Normal carotid pulses and no JVD present. Carotid bruit is not present. No neck rigidity. No tracheal deviation and no edema present. No thyromegaly present.   Cardiovascular: Normal rate, regular rhythm, S1 normal and S2 normal. PMI is not displaced. Exam reveals no gallop and no friction rub.   No murmur heard.  Pulses:       Carotid pulses are 2+ on the right side, and 2+ on the left side.  Pulmonary/Chest: Effort normal and breath sounds normal. No stridor. No respiratory distress. She has no wheezes. She has no rales. She exhibits no tenderness.   Abdominal: Soft. She exhibits no distension. There is no hepatosplenomegaly.   obese   Musculoskeletal: She exhibits no edema or tenderness.   Feet:   Right Foot:   Skin Integrity: Negative for ulcer.   Left Foot:   Skin Integrity: Negative for ulcer.   Neurological: She is alert and oriented to person, place, and time. No cranial nerve deficit.   Skin: Skin is warm and dry. No rash noted. No erythema.   Psychiatric: She has a normal mood and affect. Her speech is normal and behavior is normal.   Vitals reviewed.      DATA:   EKG: (personally reviewed tracing)  1/23/19 SR 83, AWMI/IMI age indet    Laboratory:  CBC:  Recent Labs   Lab 08/23/18  1407   WHITE BLOOD CELL COUNT 9.12   HEMOGLOBIN 12.0   HEMATOCRIT 39.3   PLATELETS 305       CHEMISTRIES:  Recent Labs   Lab 08/23/18  1407 11/27/18  1400   GLUCOSE 114 H 82   SODIUM 141 142   POTASSIUM 3.0 L 3.3 L   BUN BLD 14 12   CREATININE 0.9 0.8   EGFR IF AFRICAN AMERICAN >60.0 >60.0   EGFR IF NON- >60.0 >60.0   CALCIUM 9.6 9.3       CARDIAC BIOMARKERS:        COAGS:        LIPIDS/LFTS:  Recent Labs    Lab 06/14/17 08/23/18  1407 11/27/18  1400   CHOLESTEROL  --  231 H  --    TRIGLYCERIDES 120 140  --    HDL 44 46  --    LDL CHOLESTEROL 116 157.0  --    NON-HDL CHOLESTEROL  --  185  --    AST  --  24 20   ALT  --  20 19     Lab Results   Component Value Date    TSH 1.319 11/27/2018     The 10-year ASCVD risk score (Lovely YANG Jr., et al., 2013) is: 4.6%    Values used to calculate the score:      Age: 58 years      Sex: Female      Is Non- : No      Diabetic: No      Tobacco smoker: No      Systolic Blood Pressure: 126 mmHg      Is BP treated: Yes      HDL Cholesterol: 46 mg/dL      Total Cholesterol: 231 mg/dL      Cardiovascular Testing:  Echo 11/10/16 (Care everywhere)  1. Normal left ventricular systolic function.    Stress echo (Care everywhere) 9/8/14  1. No clinical, electrocardiographic, or echocardiographic evidence for ischemia at the workload achieved on treadmill testing (Fer 9min, 10 METS).    ASSESSMENT:   # MOORE  # atyp CP  # abnl EKG  # HTN, controlled  # palps  # hx AVNRT s/p ablation  2/1/17 ([facility])  # BMI 33    PLAN:   Cont med rx  Exho  Ex MPI  Holter  RTC 1 month    Chaitanya Angela MD, FACC

## 2019-01-27 ENCOUNTER — PATIENT MESSAGE (OUTPATIENT)
Dept: FAMILY MEDICINE | Facility: CLINIC | Age: 59
End: 2019-01-27

## 2019-01-27 DIAGNOSIS — M76.32 ILIOTIBIAL BAND SYNDROME OF LEFT SIDE: ICD-10-CM

## 2019-01-27 RX ORDER — METHOCARBAMOL 500 MG/1
TABLET, FILM COATED ORAL
Qty: 180 TABLET | Refills: 0 | Status: CANCELLED | OUTPATIENT
Start: 2019-01-27

## 2019-01-28 RX ORDER — METHOCARBAMOL 500 MG/1
500 TABLET, FILM COATED ORAL 2 TIMES DAILY PRN
Qty: 180 TABLET | Refills: 0 | Status: SHIPPED | OUTPATIENT
Start: 2019-01-28 | End: 2019-02-07

## 2019-02-01 ENCOUNTER — PATIENT MESSAGE (OUTPATIENT)
Dept: FAMILY MEDICINE | Facility: CLINIC | Age: 59
End: 2019-02-01

## 2019-02-01 ENCOUNTER — HOSPITAL ENCOUNTER (OUTPATIENT)
Dept: CARDIOLOGY | Facility: HOSPITAL | Age: 59
Discharge: HOME | End: 2019-02-01
Attending: INTERNAL MEDICINE
Payer: COMMERCIAL

## 2019-02-01 ENCOUNTER — HOSPITAL ENCOUNTER (OUTPATIENT)
Dept: RADIOLOGY | Facility: HOSPITAL | Age: 59
Discharge: HOME | End: 2019-02-01
Attending: INTERNAL MEDICINE
Payer: COMMERCIAL

## 2019-02-01 VITALS — WEIGHT: 206 LBS | HEART RATE: 80 BPM | HEIGHT: 66 IN | BODY MASS INDEX: 33.11 KG/M2

## 2019-02-01 DIAGNOSIS — I47.19 AVNRT (AV NODAL RE-ENTRY TACHYCARDIA): ICD-10-CM

## 2019-02-01 DIAGNOSIS — R06.09 DOE (DYSPNEA ON EXERTION): ICD-10-CM

## 2019-02-01 DIAGNOSIS — R07.9 CHEST PAIN, UNSPECIFIED TYPE: ICD-10-CM

## 2019-02-01 DIAGNOSIS — R94.31 ABNORMAL EKG: ICD-10-CM

## 2019-02-01 LAB
AORTIC ROOT ANNULUS: 3.54 CM
AORTIC VALVE CUSP SEPERATION: 2.43 CM
ASCENDING AORTA: 3.5 CM
AV INDEX (PROSTH): 0.76
AV MEAN GRADIENT: 5.12 MMHG
AV PEAK GRADIENT: 8.29 MMHG
AV VALVE AREA: 2.31 CM2
AV VELOCITY RATIO: 0.76
BSA FOR ECHO PROCEDURE: 2.09 M2
CV ECHO LV RWT: 0.76 CM
CV STRESS BASE HR: 84 BPM
DIASTOLIC BLOOD PRESSURE: 87 MMHG
DOP CALC AO PEAK VEL: 1.44 M/S
DOP CALC AO VTI: 32.4 CM
DOP CALC LVOT AREA: 3.05 CM2
DOP CALC LVOT DIAMETER: 1.97 CM
DOP CALC LVOT PEAK VEL: 1.1 M/S
DOP CALC LVOT STROKE VOLUME: 74.97 CM3
DOP CALCLVOT PEAK VEL VTI: 24.61 CM
E WAVE DECELERATION TIME: 204.96 MSEC
E/A RATIO: 1.05
ECHO LV POSTERIOR WALL: 1.54 CM (ref 0.6–1.1)
FRACTIONAL SHORTENING: 35 % (ref 28–44)
INTERVENTRICULAR SEPTUM: 1.44 CM (ref 0.6–1.1)
IVRT: 0.13 MSEC
LA MAJOR: 4.85 CM
LA MINOR: 4.43 CM
LA WIDTH: 3.2 CM
LEFT ATRIUM SIZE: 3.5 CM
LEFT ATRIUM VOLUME INDEX: 21.8 ML/M2
LEFT ATRIUM VOLUME: 44.08 CM3
LEFT INTERNAL DIMENSION IN SYSTOLE: 2.65 CM (ref 2.1–4)
LEFT VENTRICLE DIASTOLIC VOLUME INDEX: 35.83 ML/M2
LEFT VENTRICLE DIASTOLIC VOLUME: 72.57 ML
LEFT VENTRICLE MASS INDEX: 116.1 G/M2
LEFT VENTRICLE SYSTOLIC VOLUME INDEX: 12.7 ML/M2
LEFT VENTRICLE SYSTOLIC VOLUME: 25.76 ML
LEFT VENTRICULAR INTERNAL DIMENSION IN DIASTOLE: 4.06 CM (ref 3.5–6)
LEFT VENTRICULAR MASS: 235.22 G
MV PEAK A VEL: 0.86 M/S
MV PEAK E VEL: 0.9 M/S
NUC REST EJECTION FRACTION: 67
OHS CV CPX 1 MINUTE RECOVERY HEART RATE: 137 BPM
OHS CV CPX 85 PERCENT MAX PREDICTED HEART RATE MALE: 132
OHS CV CPX ESTIMATED METS: 9
OHS CV CPX MAX PREDICTED HEART RATE: 155
OHS CV CPX PATIENT IS FEMALE: 1
OHS CV CPX PATIENT IS MALE: 0
OHS CV CPX PEAK DIASTOLIC BLOOD PRESSURE: 58 MMHG
OHS CV CPX PEAK HEAR RATE: 142 BPM
OHS CV CPX PEAK RATE PRESSURE PRODUCT: NORMAL
OHS CV CPX PEAK SYSTOLIC BLOOD PRESSURE: 135 MMHG
OHS CV CPX PERCENT MAX PREDICTED HEART RATE ACHIEVED: 92
OHS CV CPX PERCENT TARGET HEART RATE ACHIEVED: 103.65
OHS CV CPX RATE PRESSURE PRODUCT PRESENTING: NORMAL
OHS CV CPX TARGET HEART RATE: 137
PISA TR MAX VEL: 2.65 M/S
PULM VEIN S/D RATIO: 1.5
PV PEAK D VEL: 0.46 M/S
PV PEAK S VEL: 0.69 M/S
PV PEAK VELOCITY: 1.08 CM/S
RA MAJOR: 4.54 CM
RA PRESSURE: 3 MMHG
RA WIDTH: 3.54 CM
RIGHT VENTRICULAR END-DIASTOLIC DIMENSION: 3.07 CM
RV TISSUE DOPPLER FREE WALL SYSTOLIC VELOCITY 1 (APICAL 4 CHAMBER VIEW): 9.74 M/S
SINUS: 3.41 CM
STJ: 3.09 CM
STRESS ANGINA INDEX: 0
STRESS DUKE TREADMILL SCORE: 3
STRESS ECHO POST EXERCISE DUR MIN: 7 MIN
STRESS ECHO POST EXERCISE DUR SEC: 39
STRESS ST DEPRESSION: 1 MM
SYSTOLIC BLOOD PRESSURE: 161 MMHG
TR MAX PG: 28.09 MMHG
TRICUSPID ANNULAR PLANE SYSTOLIC EXCURSION: 2.14 CM
TV REST PULMONARY ARTERY PRESSURE: 31 MMHG

## 2019-02-01 PROCEDURE — 93225 XTRNL ECG REC<48 HRS REC: CPT

## 2019-02-01 PROCEDURE — 93018 CV STRESS TEST I&R ONLY: CPT | Mod: ,,, | Performed by: INTERNAL MEDICINE

## 2019-02-01 PROCEDURE — 93306 TRANSTHORACIC ECHO (TTE) COMPLETE (CUPID ONLY): ICD-10-PCS | Mod: 26,,, | Performed by: INTERNAL MEDICINE

## 2019-02-01 PROCEDURE — 78452 HT MUSCLE IMAGE SPECT MULT: CPT | Mod: 26,,, | Performed by: INTERNAL MEDICINE

## 2019-02-01 PROCEDURE — 93306 TTE W/DOPPLER COMPLETE: CPT | Mod: 26,,, | Performed by: INTERNAL MEDICINE

## 2019-02-01 PROCEDURE — 93227 HOLTER MONITOR - 24 HOUR (CUPID ONLY): ICD-10-PCS | Mod: ,,, | Performed by: INTERNAL MEDICINE

## 2019-02-01 PROCEDURE — 93016 CV STRESS TEST SUPVJ ONLY: CPT | Mod: ,,, | Performed by: INTERNAL MEDICINE

## 2019-02-01 PROCEDURE — 93016 STRESS TEST WITH MYOCARDIAL PERFUSION (CUPID ONLY): ICD-10-PCS | Mod: ,,, | Performed by: INTERNAL MEDICINE

## 2019-02-01 PROCEDURE — 93018 STRESS TEST WITH MYOCARDIAL PERFUSION (CUPID ONLY): ICD-10-PCS | Mod: ,,, | Performed by: INTERNAL MEDICINE

## 2019-02-01 PROCEDURE — 78452 STRESS TEST WITH MYOCARDIAL PERFUSION (CUPID ONLY): ICD-10-PCS | Mod: 26,,, | Performed by: INTERNAL MEDICINE

## 2019-02-01 PROCEDURE — 93017 CV STRESS TEST TRACING ONLY: CPT

## 2019-02-01 PROCEDURE — 93227 XTRNL ECG REC<48 HR R&I: CPT | Mod: ,,, | Performed by: INTERNAL MEDICINE

## 2019-02-01 PROCEDURE — A9502 TC99M TETROFOSMIN: HCPCS

## 2019-02-01 PROCEDURE — 93306 TTE W/DOPPLER COMPLETE: CPT

## 2019-02-04 ENCOUNTER — OFFICE VISIT (OUTPATIENT)
Dept: CARDIOLOGY | Facility: CLINIC | Age: 59
End: 2019-02-04
Payer: COMMERCIAL

## 2019-02-04 VITALS
SYSTOLIC BLOOD PRESSURE: 138 MMHG | BODY MASS INDEX: 33.34 KG/M2 | HEIGHT: 66 IN | DIASTOLIC BLOOD PRESSURE: 80 MMHG | OXYGEN SATURATION: 96 % | HEART RATE: 99 BPM | RESPIRATION RATE: 15 BRPM | WEIGHT: 207.44 LBS

## 2019-02-04 DIAGNOSIS — R94.39 ABNORMAL NUCLEAR STRESS TEST: ICD-10-CM

## 2019-02-04 DIAGNOSIS — I10 ESSENTIAL HYPERTENSION: ICD-10-CM

## 2019-02-04 DIAGNOSIS — I47.19 AVNRT (AV NODAL RE-ENTRY TACHYCARDIA): ICD-10-CM

## 2019-02-04 DIAGNOSIS — R00.2 HEART PALPITATIONS: ICD-10-CM

## 2019-02-04 DIAGNOSIS — I25.10 CORONARY ARTERY DISEASE INVOLVING NATIVE CORONARY ARTERY OF NATIVE HEART WITHOUT ANGINA PECTORIS: Primary | ICD-10-CM

## 2019-02-04 PROCEDURE — 3079F DIAST BP 80-89 MM HG: CPT | Mod: CPTII,S$GLB,, | Performed by: INTERNAL MEDICINE

## 2019-02-04 PROCEDURE — 3008F BODY MASS INDEX DOCD: CPT | Mod: CPTII,S$GLB,, | Performed by: INTERNAL MEDICINE

## 2019-02-04 PROCEDURE — 3079F PR MOST RECENT DIASTOLIC BLOOD PRESSURE 80-89 MM HG: ICD-10-PCS | Mod: CPTII,S$GLB,, | Performed by: INTERNAL MEDICINE

## 2019-02-04 PROCEDURE — 3075F PR MOST RECENT SYSTOLIC BLOOD PRESS GE 130-139MM HG: ICD-10-PCS | Mod: CPTII,S$GLB,, | Performed by: INTERNAL MEDICINE

## 2019-02-04 PROCEDURE — 99215 PR OFFICE/OUTPT VISIT, EST, LEVL V, 40-54 MIN: ICD-10-PCS | Mod: S$GLB,,, | Performed by: INTERNAL MEDICINE

## 2019-02-04 PROCEDURE — 99215 OFFICE O/P EST HI 40 MIN: CPT | Mod: S$GLB,,, | Performed by: INTERNAL MEDICINE

## 2019-02-04 PROCEDURE — 99999 PR PBB SHADOW E&M-EST. PATIENT-LVL III: CPT | Mod: PBBFAC,,, | Performed by: INTERNAL MEDICINE

## 2019-02-04 PROCEDURE — 3008F PR BODY MASS INDEX (BMI) DOCUMENTED: ICD-10-PCS | Mod: CPTII,S$GLB,, | Performed by: INTERNAL MEDICINE

## 2019-02-04 PROCEDURE — 99999 PR PBB SHADOW E&M-EST. PATIENT-LVL III: ICD-10-PCS | Mod: PBBFAC,,, | Performed by: INTERNAL MEDICINE

## 2019-02-04 PROCEDURE — 3075F SYST BP GE 130 - 139MM HG: CPT | Mod: CPTII,S$GLB,, | Performed by: INTERNAL MEDICINE

## 2019-02-04 RX ORDER — AMLODIPINE BESYLATE 5 MG/1
5 TABLET ORAL DAILY
Qty: 90 TABLET | Refills: 3 | Status: SHIPPED | OUTPATIENT
Start: 2019-02-04 | End: 2019-03-04 | Stop reason: SDUPTHER

## 2019-02-04 RX ORDER — SODIUM CHLORIDE 9 MG/ML
3 INJECTION, SOLUTION INTRAVENOUS CONTINUOUS
Status: CANCELLED | OUTPATIENT
Start: 2019-02-25 | End: 2019-02-25

## 2019-02-04 RX ORDER — CLOPIDOGREL BISULFATE 75 MG/1
75 TABLET ORAL DAILY
Qty: 30 TABLET | Refills: 11 | Status: ON HOLD | OUTPATIENT
Start: 2019-02-04 | End: 2019-02-25 | Stop reason: HOSPADM

## 2019-02-04 RX ORDER — ASPIRIN 81 MG/1
81 TABLET ORAL DAILY
Qty: 90 TABLET | Refills: 3
Start: 2019-02-04

## 2019-02-04 NOTE — H&P (VIEW-ONLY)
CARDIOVASCULAR PROGRESS NOTE    REASON FOR CONSULT:   Alyce Zurita is a 58 y.o. female who presents for f/u of MOORE, testing.    PCP: Giovanni  HISTORY OF PRESENT ILLNESS:   The patient returns for follow-up of her testing.  While she does not describe typical chest discomfort, she does describe concerning throat discomfort which occurs with exercise and is relieved with drinking water/at rest.  She had the symptoms during her treadmill stress test for her nuclear study.  EKG was abnormal, and the myocardial perfusion imaging suggest anterior ischemia.  The patient has had no shortness of breath, further palpitations, nor any lightheadedness, dizziness, or syncope.  There has been no PND, orthopnea, or lower extremity edema.  She denies melena, hematuria, or claudicant symptoms.    I reviewed the results of her nuclear stress test and echo.  The echo was normal.  The nuclear stress test did reveal EKG changes at a moderate work load with non limiting throat discomfort concerning for angina.  Perfusion noted the anterior ischemia.  The patient tells me she just turned in her Holter, and this has not yet been read.    CARDIOVASCULAR HISTORY:   ?CAD (MPI 1/2019 with ant isch)  AVNRT s/p ablation 2/2017 ([facility])    PAST MEDICAL HISTORY:     Past Medical History:   Diagnosis Date    Allergy     Anxiety 11/16/2018    Arthritis     Breast injury     left 10 yrs ago/ hit in chest    Carpal tunnel syndrome of left wrist 9/10/2018    GERD (gastroesophageal reflux disease)     Hyperlipemia     Hypertension, uncontrolled 8/17/2018    Kidney problem     Meningitis     3 months old    Tachycardia        PAST SURGICAL HISTORY:     Past Surgical History:   Procedure Laterality Date    APPENDECTOMY      Heart Procedure       HYSTERECTOMY  1993    KIDNEY SURGERY      OOPHORECTOMY Bilateral 1993       ALLERGIES AND MEDICATION:     Review of patient's allergies indicates:   Allergen Reactions    Rofecoxib Nausea And  Vomiting    Other omega-3s Nausea And Vomiting     chlorine        Medication List           Accurate as of 2/4/19  3:42 PM. If you have any questions, ask your nurse or doctor.               CHANGE how you take these medications    FLUoxetine 40 MG capsule  Take 1 capsule (40 mg total) by mouth every morning.  What changed:  how much to take        CONTINUE taking these medications    amitriptyline 150 MG Tab  Commonly known as:  ELAVIL  Take 1 tablet (150 mg total) by mouth nightly as needed.     diclofenac sodium 1 % Gel  Commonly known as:  VOLTAREN  Apply 2 g topically 4 (four) times daily.     fluticasone 50 mcg/actuation nasal spray  Commonly known as:  FLONASE     gabapentin 300 MG capsule  Commonly known as:  NEURONTIN  Take 2 tablet in the morning and 2 tablet nightly     hydroCHLOROthiazide 12.5 mg capsule  Commonly known as:  MICROZIDE  Take 1 capsule (12.5 mg total) by mouth once daily.     ibuprofen 600 MG tablet  Commonly known as:  ADVIL,MOTRIN     meloxicam 15 MG tablet  Commonly known as:  MOBIC  Take 1 tablet (15 mg total) by mouth once daily. For knee pain     methocarbamol 500 MG Tab  Commonly known as:  ROBAXIN  Take 1 tablet (500 mg total) by mouth 2 (two) times daily as needed (pain with L leg).     metoprolol tartrate 25 MG tablet  Commonly known as:  LOPRESSOR  Take 1 tablet (25 mg total) by mouth once daily.     montelukast 10 mg tablet  Commonly known as:  SINGULAIR  TAKE 1 TABLET BY MOUTH ONCE DAILY IN THE EVENING     NEXIUM ORAL     potassium chloride SA 20 MEQ tablet  Commonly known as:  K-DUR,KLOR-CON  Take 1 tablet (20 mEq total) by mouth 2 (two) times daily.     VITAMIN D3 ORAL        STOP taking these medications    glucosamine-chondroitin 500-400 mg tablet  Stopped by:  Chaitanya Angela MD            SOCIAL HISTORY:     Social History     Socioeconomic History    Marital status:      Spouse name: Not on file    Number of children: Not on file    Years of education:  Not on file    Highest education level: Not on file   Social Needs    Financial resource strain: Not on file    Food insecurity - worry: Not on file    Food insecurity - inability: Not on file    Transportation needs - medical: Not on file    Transportation needs - non-medical: Not on file   Occupational History    Not on file   Tobacco Use    Smoking status: Never Smoker    Smokeless tobacco: Never Used   Substance and Sexual Activity    Alcohol use: No    Drug use: Not on file    Sexual activity: Yes     Partners: Male     Birth control/protection: None     Comment: 7/20/18  with same partner for 40 years    Other Topics Concern    Not on file   Social History Narrative    Not on file       FAMILY HISTORY:     Family History   Problem Relation Age of Onset    Arthritis Mother     Glaucoma Mother     Breast cancer Mother     Asthma Daughter     Breast cancer Maternal Aunt     Breast cancer Maternal Grandmother     Breast cancer Paternal Grandmother     Breast cancer Maternal Aunt        REVIEW OF SYSTEMS:   Review of Systems   Constitutional: Negative for chills, diaphoresis and fever.   HENT: Negative for nosebleeds.    Eyes: Negative for blurred vision, double vision and photophobia.   Respiratory: Positive for shortness of breath. Negative for hemoptysis and wheezing.    Cardiovascular: Positive for chest pain and palpitations. Negative for orthopnea, claudication, leg swelling and PND.   Gastrointestinal: Negative for abdominal pain, blood in stool, heartburn, melena, nausea and vomiting.   Genitourinary: Negative for flank pain and hematuria.   Musculoskeletal: Negative for falls, myalgias and neck pain.   Skin: Negative for rash.   Neurological: Negative for dizziness, seizures, loss of consciousness, weakness and headaches.   Endo/Heme/Allergies: Negative for polydipsia. Does not bruise/bleed easily.   Psychiatric/Behavioral: Negative for depression and memory loss. The patient  "is not nervous/anxious.        PHYSICAL EXAM:     Vitals:    02/04/19 1533   BP: 138/80   Pulse: 99   Resp: 15    Body mass index is 33.48 kg/m².  Weight: 94.1 kg (207 lb 7.3 oz)   Height: 5' 6" (167.6 cm)     Physical Exam   Constitutional: She is oriented to person, place, and time. She appears well-developed and well-nourished. She is cooperative.  Non-toxic appearance. No distress.   HENT:   Head: Normocephalic and atraumatic.   Eyes: Conjunctivae and EOM are normal. Pupils are equal, round, and reactive to light. No scleral icterus.   Neck: Trachea normal and normal range of motion. Neck supple. Normal carotid pulses and no JVD present. Carotid bruit is not present. No neck rigidity. No tracheal deviation and no edema present. No thyromegaly present.   Cardiovascular: Normal rate, regular rhythm, S1 normal and S2 normal. PMI is not displaced. Exam reveals no gallop and no friction rub.   No murmur heard.  Pulses:       Carotid pulses are 2+ on the right side, and 2+ on the left side.  Pulmonary/Chest: Effort normal and breath sounds normal. No stridor. No respiratory distress. She has no wheezes. She has no rales. She exhibits no tenderness.   Abdominal: Soft. She exhibits no distension. There is no hepatosplenomegaly.   obese   Musculoskeletal: She exhibits no edema or tenderness.   Feet:   Right Foot:   Skin Integrity: Negative for ulcer.   Left Foot:   Skin Integrity: Negative for ulcer.   Neurological: She is alert and oriented to person, place, and time. No cranial nerve deficit.   Skin: Skin is warm and dry. No rash noted. No erythema.   Psychiatric: She has a normal mood and affect. Her speech is normal and behavior is normal.   Vitals reviewed.      DATA:   EKG: (personally reviewed tracing)  1/23/19 SR 83, AWMI/IMI age indet    Laboratory:  CBC:  Recent Labs   Lab 08/23/18  1407   WHITE BLOOD CELL COUNT 9.12   HEMOGLOBIN 12.0   HEMATOCRIT 39.3   PLATELETS 305       CHEMISTRIES:  Recent Labs   Lab " 08/23/18  1407 11/27/18  1400   GLUCOSE 114 H 82   SODIUM 141 142   POTASSIUM 3.0 L 3.3 L   BUN BLD 14 12   CREATININE 0.9 0.8   EGFR IF AFRICAN AMERICAN >60.0 >60.0   EGFR IF NON- >60.0 >60.0   CALCIUM 9.6 9.3       CARDIAC BIOMARKERS:        COAGS:        LIPIDS/LFTS:  Recent Labs   Lab 06/14/17 08/23/18  1407 11/27/18  1400   CHOLESTEROL  --  231 H  --    TRIGLYCERIDES 120 140  --    HDL 44 46  --    LDL CHOLESTEROL 116 157.0  --    NON-HDL CHOLESTEROL  --  185  --    AST  --  24 20   ALT  --  20 19     Lab Results   Component Value Date    TSH 1.319 11/27/2018     The 10-year ASCVD risk score (Lovely YANG Jr., et al., 2013) is: 5.5%    Values used to calculate the score:      Age: 58 years      Sex: Female      Is Non- : No      Diabetic: No      Tobacco smoker: No      Systolic Blood Pressure: 138 mmHg      Is BP treated: Yes      HDL Cholesterol: 46 mg/dL      Total Cholesterol: 231 mg/dL      Cardiovascular Testing:  Ex MPI 2/1/19 (images pers rev)  There is a mild, ischemia defect(s) in the anteroapical wall(s),  An ejection fraction of 67 % at rest  The EKG portion of this study is positive for myocardial ischemia. (Fer 7:39, 9 METS, 92% MPHR, -CP, +EKG (1mm downsloping ST depression))    Echo 2/1/19  Normal left ventricular systolic function. The estimated ejection fraction is 65%  Concentric left ventricular hypertrophy.  Normal LV diastolic function.  Normal right ventricular systolic function.  Mild pulmonic regurgitation.    ASSESSMENT:   # MOORE/CP, todays description of throat tightness more concerning for angina.  MPI 2/2019 with anterior ischemia.  # abnl EKG  # HTN, controlled  # palps  # hx AVNRT s/p ablation  2/1/17 ([facility])  # BMI 33, stable vs last OV    PLAN:   Cont med rx  Holter report pending  Add ASA 81mg qd  Add Plavix 75mg qd  Add amlod 5mg qd as a second antianginal  Stop HCTZ  Cath 2/25/18 12 pm, R rad access  RTC after cath  Start statin pending cath  results    Risks, benefits and alternatives of the catheterization procedure were discussed with the patient and her  in attendance, which include but are not limited to: bleeding, infection, death, heart attack, arrhythmia, kidney failure, stroke, need for emergency surgery, etc.  Patient understands and and agrees to proceed.  Consent was placed on the chart.      Chaitanya Angela MD, FACC

## 2019-02-04 NOTE — PROGRESS NOTES
CARDIOVASCULAR PROGRESS NOTE    REASON FOR CONSULT:   Alyce Zurita is a 58 y.o. female who presents for f/u of MOORE, testing.    PCP: Giovanni  HISTORY OF PRESENT ILLNESS:   The patient returns for follow-up of her testing.  While she does not describe typical chest discomfort, she does describe concerning throat discomfort which occurs with exercise and is relieved with drinking water/at rest.  She had the symptoms during her treadmill stress test for her nuclear study.  EKG was abnormal, and the myocardial perfusion imaging suggest anterior ischemia.  The patient has had no shortness of breath, further palpitations, nor any lightheadedness, dizziness, or syncope.  There has been no PND, orthopnea, or lower extremity edema.  She denies melena, hematuria, or claudicant symptoms.    I reviewed the results of her nuclear stress test and echo.  The echo was normal.  The nuclear stress test did reveal EKG changes at a moderate work load with non limiting throat discomfort concerning for angina.  Perfusion noted the anterior ischemia.  The patient tells me she just turned in her Holter, and this has not yet been read.    CARDIOVASCULAR HISTORY:   ?CAD (MPI 1/2019 with ant isch)  AVNRT s/p ablation 2/2017 ([facility])    PAST MEDICAL HISTORY:     Past Medical History:   Diagnosis Date    Allergy     Anxiety 11/16/2018    Arthritis     Breast injury     left 10 yrs ago/ hit in chest    Carpal tunnel syndrome of left wrist 9/10/2018    GERD (gastroesophageal reflux disease)     Hyperlipemia     Hypertension, uncontrolled 8/17/2018    Kidney problem     Meningitis     3 months old    Tachycardia        PAST SURGICAL HISTORY:     Past Surgical History:   Procedure Laterality Date    APPENDECTOMY      Heart Procedure       HYSTERECTOMY  1993    KIDNEY SURGERY      OOPHORECTOMY Bilateral 1993       ALLERGIES AND MEDICATION:     Review of patient's allergies indicates:   Allergen Reactions    Rofecoxib Nausea And  Vomiting    Other omega-3s Nausea And Vomiting     chlorine        Medication List           Accurate as of 2/4/19  3:42 PM. If you have any questions, ask your nurse or doctor.               CHANGE how you take these medications    FLUoxetine 40 MG capsule  Take 1 capsule (40 mg total) by mouth every morning.  What changed:  how much to take        CONTINUE taking these medications    amitriptyline 150 MG Tab  Commonly known as:  ELAVIL  Take 1 tablet (150 mg total) by mouth nightly as needed.     diclofenac sodium 1 % Gel  Commonly known as:  VOLTAREN  Apply 2 g topically 4 (four) times daily.     fluticasone 50 mcg/actuation nasal spray  Commonly known as:  FLONASE     gabapentin 300 MG capsule  Commonly known as:  NEURONTIN  Take 2 tablet in the morning and 2 tablet nightly     hydroCHLOROthiazide 12.5 mg capsule  Commonly known as:  MICROZIDE  Take 1 capsule (12.5 mg total) by mouth once daily.     ibuprofen 600 MG tablet  Commonly known as:  ADVIL,MOTRIN     meloxicam 15 MG tablet  Commonly known as:  MOBIC  Take 1 tablet (15 mg total) by mouth once daily. For knee pain     methocarbamol 500 MG Tab  Commonly known as:  ROBAXIN  Take 1 tablet (500 mg total) by mouth 2 (two) times daily as needed (pain with L leg).     metoprolol tartrate 25 MG tablet  Commonly known as:  LOPRESSOR  Take 1 tablet (25 mg total) by mouth once daily.     montelukast 10 mg tablet  Commonly known as:  SINGULAIR  TAKE 1 TABLET BY MOUTH ONCE DAILY IN THE EVENING     NEXIUM ORAL     potassium chloride SA 20 MEQ tablet  Commonly known as:  K-DUR,KLOR-CON  Take 1 tablet (20 mEq total) by mouth 2 (two) times daily.     VITAMIN D3 ORAL        STOP taking these medications    glucosamine-chondroitin 500-400 mg tablet  Stopped by:  Chaitanya Angela MD            SOCIAL HISTORY:     Social History     Socioeconomic History    Marital status:      Spouse name: Not on file    Number of children: Not on file    Years of education:  Not on file    Highest education level: Not on file   Social Needs    Financial resource strain: Not on file    Food insecurity - worry: Not on file    Food insecurity - inability: Not on file    Transportation needs - medical: Not on file    Transportation needs - non-medical: Not on file   Occupational History    Not on file   Tobacco Use    Smoking status: Never Smoker    Smokeless tobacco: Never Used   Substance and Sexual Activity    Alcohol use: No    Drug use: Not on file    Sexual activity: Yes     Partners: Male     Birth control/protection: None     Comment: 7/20/18  with same partner for 40 years    Other Topics Concern    Not on file   Social History Narrative    Not on file       FAMILY HISTORY:     Family History   Problem Relation Age of Onset    Arthritis Mother     Glaucoma Mother     Breast cancer Mother     Asthma Daughter     Breast cancer Maternal Aunt     Breast cancer Maternal Grandmother     Breast cancer Paternal Grandmother     Breast cancer Maternal Aunt        REVIEW OF SYSTEMS:   Review of Systems   Constitutional: Negative for chills, diaphoresis and fever.   HENT: Negative for nosebleeds.    Eyes: Negative for blurred vision, double vision and photophobia.   Respiratory: Positive for shortness of breath. Negative for hemoptysis and wheezing.    Cardiovascular: Positive for chest pain and palpitations. Negative for orthopnea, claudication, leg swelling and PND.   Gastrointestinal: Negative for abdominal pain, blood in stool, heartburn, melena, nausea and vomiting.   Genitourinary: Negative for flank pain and hematuria.   Musculoskeletal: Negative for falls, myalgias and neck pain.   Skin: Negative for rash.   Neurological: Negative for dizziness, seizures, loss of consciousness, weakness and headaches.   Endo/Heme/Allergies: Negative for polydipsia. Does not bruise/bleed easily.   Psychiatric/Behavioral: Negative for depression and memory loss. The patient  "is not nervous/anxious.        PHYSICAL EXAM:     Vitals:    02/04/19 1533   BP: 138/80   Pulse: 99   Resp: 15    Body mass index is 33.48 kg/m².  Weight: 94.1 kg (207 lb 7.3 oz)   Height: 5' 6" (167.6 cm)     Physical Exam   Constitutional: She is oriented to person, place, and time. She appears well-developed and well-nourished. She is cooperative.  Non-toxic appearance. No distress.   HENT:   Head: Normocephalic and atraumatic.   Eyes: Conjunctivae and EOM are normal. Pupils are equal, round, and reactive to light. No scleral icterus.   Neck: Trachea normal and normal range of motion. Neck supple. Normal carotid pulses and no JVD present. Carotid bruit is not present. No neck rigidity. No tracheal deviation and no edema present. No thyromegaly present.   Cardiovascular: Normal rate, regular rhythm, S1 normal and S2 normal. PMI is not displaced. Exam reveals no gallop and no friction rub.   No murmur heard.  Pulses:       Carotid pulses are 2+ on the right side, and 2+ on the left side.  Pulmonary/Chest: Effort normal and breath sounds normal. No stridor. No respiratory distress. She has no wheezes. She has no rales. She exhibits no tenderness.   Abdominal: Soft. She exhibits no distension. There is no hepatosplenomegaly.   obese   Musculoskeletal: She exhibits no edema or tenderness.   Feet:   Right Foot:   Skin Integrity: Negative for ulcer.   Left Foot:   Skin Integrity: Negative for ulcer.   Neurological: She is alert and oriented to person, place, and time. No cranial nerve deficit.   Skin: Skin is warm and dry. No rash noted. No erythema.   Psychiatric: She has a normal mood and affect. Her speech is normal and behavior is normal.   Vitals reviewed.      DATA:   EKG: (personally reviewed tracing)  1/23/19 SR 83, AWMI/IMI age indet    Laboratory:  CBC:  Recent Labs   Lab 08/23/18  1407   WHITE BLOOD CELL COUNT 9.12   HEMOGLOBIN 12.0   HEMATOCRIT 39.3   PLATELETS 305       CHEMISTRIES:  Recent Labs   Lab " 08/23/18  1407 11/27/18  1400   GLUCOSE 114 H 82   SODIUM 141 142   POTASSIUM 3.0 L 3.3 L   BUN BLD 14 12   CREATININE 0.9 0.8   EGFR IF AFRICAN AMERICAN >60.0 >60.0   EGFR IF NON- >60.0 >60.0   CALCIUM 9.6 9.3       CARDIAC BIOMARKERS:        COAGS:        LIPIDS/LFTS:  Recent Labs   Lab 06/14/17 08/23/18  1407 11/27/18  1400   CHOLESTEROL  --  231 H  --    TRIGLYCERIDES 120 140  --    HDL 44 46  --    LDL CHOLESTEROL 116 157.0  --    NON-HDL CHOLESTEROL  --  185  --    AST  --  24 20   ALT  --  20 19     Lab Results   Component Value Date    TSH 1.319 11/27/2018     The 10-year ASCVD risk score (Lovely YANG Jr., et al., 2013) is: 5.5%    Values used to calculate the score:      Age: 58 years      Sex: Female      Is Non- : No      Diabetic: No      Tobacco smoker: No      Systolic Blood Pressure: 138 mmHg      Is BP treated: Yes      HDL Cholesterol: 46 mg/dL      Total Cholesterol: 231 mg/dL      Cardiovascular Testing:  Ex MPI 2/1/19 (images pers rev)  There is a mild, ischemia defect(s) in the anteroapical wall(s),  An ejection fraction of 67 % at rest  The EKG portion of this study is positive for myocardial ischemia. (Fer 7:39, 9 METS, 92% MPHR, -CP, +EKG (1mm downsloping ST depression))    Echo 2/1/19  Normal left ventricular systolic function. The estimated ejection fraction is 65%  Concentric left ventricular hypertrophy.  Normal LV diastolic function.  Normal right ventricular systolic function.  Mild pulmonic regurgitation.    ASSESSMENT:   # MOORE/CP, todays description of throat tightness more concerning for angina.  MPI 2/2019 with anterior ischemia.  # abnl EKG  # HTN, controlled  # palps  # hx AVNRT s/p ablation  2/1/17 ([facility])  # BMI 33, stable vs last OV    PLAN:   Cont med rx  Holter report pending  Add ASA 81mg qd  Add Plavix 75mg qd  Add amlod 5mg qd as a second antianginal  Stop HCTZ  Cath 2/25/18 12 pm, R rad access  RTC after cath  Start statin pending cath  results    Risks, benefits and alternatives of the catheterization procedure were discussed with the patient and her  in attendance, which include but are not limited to: bleeding, infection, death, heart attack, arrhythmia, kidney failure, stroke, need for emergency surgery, etc.  Patient understands and and agrees to proceed.  Consent was placed on the chart.      Chaitanya Angela MD, FACC

## 2019-02-11 LAB
OHS CV EVENT MONITOR DAY: 1
OHS CV HOLTER LENGTH DECIMAL HOURS: 47.98
OHS CV HOLTER LENGTH HOURS: 23
OHS CV HOLTER LENGTH MINUTES: 59

## 2019-02-20 ENCOUNTER — HOSPITAL ENCOUNTER (OUTPATIENT)
Dept: PREADMISSION TESTING | Facility: HOSPITAL | Age: 59
Discharge: HOME | End: 2019-02-20
Attending: INTERNAL MEDICINE
Payer: COMMERCIAL

## 2019-02-20 VITALS
WEIGHT: 207.06 LBS | HEART RATE: 93 BPM | RESPIRATION RATE: 20 BRPM | SYSTOLIC BLOOD PRESSURE: 135 MMHG | OXYGEN SATURATION: 95 % | BODY MASS INDEX: 34.5 KG/M2 | TEMPERATURE: 98 F | HEIGHT: 65 IN | DIASTOLIC BLOOD PRESSURE: 85 MMHG

## 2019-02-20 DIAGNOSIS — I25.10 CORONARY ARTERY DISEASE INVOLVING NATIVE CORONARY ARTERY OF NATIVE HEART WITHOUT ANGINA PECTORIS: ICD-10-CM

## 2019-02-20 DIAGNOSIS — R94.39 ABNORMAL NUCLEAR STRESS TEST: ICD-10-CM

## 2019-02-20 DIAGNOSIS — M25.562 ACUTE PAIN OF LEFT KNEE: ICD-10-CM

## 2019-02-20 LAB
ANION GAP SERPL CALC-SCNC: 8 MMOL/L
BASOPHILS # BLD AUTO: 0.01 K/UL
BASOPHILS NFR BLD: 0.2 %
BUN SERPL-MCNC: 10 MG/DL
CALCIUM SERPL-MCNC: 9.1 MG/DL
CHLORIDE SERPL-SCNC: 106 MMOL/L
CO2 SERPL-SCNC: 24 MMOL/L
CREAT SERPL-MCNC: 0.8 MG/DL
DIFFERENTIAL METHOD: ABNORMAL
EOSINOPHIL # BLD AUTO: 0.1 K/UL
EOSINOPHIL NFR BLD: 2.2 %
ERYTHROCYTE [DISTWIDTH] IN BLOOD BY AUTOMATED COUNT: 14 %
EST. GFR  (AFRICAN AMERICAN): >60 ML/MIN/1.73 M^2
EST. GFR  (NON AFRICAN AMERICAN): >60 ML/MIN/1.73 M^2
GLUCOSE SERPL-MCNC: 131 MG/DL
HCT VFR BLD AUTO: 38.9 %
HGB BLD-MCNC: 12.1 G/DL
INR PPP: 1
LYMPHOCYTES # BLD AUTO: 1.7 K/UL
LYMPHOCYTES NFR BLD: 26.4 %
MCH RBC QN AUTO: 28.7 PG
MCHC RBC AUTO-ENTMCNC: 31.1 G/DL
MCV RBC AUTO: 92 FL
MONOCYTES # BLD AUTO: 0.5 K/UL
MONOCYTES NFR BLD: 7.8 %
NEUTROPHILS # BLD AUTO: 4.1 K/UL
NEUTROPHILS NFR BLD: 63.4 %
PLATELET # BLD AUTO: 352 K/UL
PMV BLD AUTO: 9.6 FL
POTASSIUM SERPL-SCNC: 3.8 MMOL/L
PROTHROMBIN TIME: 10.1 SEC
RBC # BLD AUTO: 4.22 M/UL
SODIUM SERPL-SCNC: 138 MMOL/L
WBC # BLD AUTO: 6.45 K/UL

## 2019-02-20 PROCEDURE — 85610 PROTHROMBIN TIME: CPT

## 2019-02-20 PROCEDURE — 36415 COLL VENOUS BLD VENIPUNCTURE: CPT

## 2019-02-20 PROCEDURE — 80048 BASIC METABOLIC PNL TOTAL CA: CPT

## 2019-02-20 PROCEDURE — 85025 COMPLETE CBC W/AUTO DIFF WBC: CPT

## 2019-02-20 RX ORDER — MELOXICAM 15 MG/1
15 TABLET ORAL DAILY
Qty: 30 TABLET | Refills: 1 | Status: SHIPPED | OUTPATIENT
Start: 2019-02-20 | End: 2019-04-22 | Stop reason: SDUPTHER

## 2019-02-20 NOTE — DISCHARGE INSTRUCTIONS
"Your angiogram is scheduled for__MONDAY 2/25_____________    Call [phone] between 2pm and 5pm on _Friday 2/22___________to find out your arrival time for the day of your procedure.    Report to Same Day Surgery Unit at ____ AM on the 2nd floor of the hospital.  Use the front entrance of the hospital.  The front doors of the hospital open promptly at 5:30am.  If you need wheelchair assistance, call [phone] from your cell phone, or call "0" from the courtesy phone in the lobby.    IMPORTANT INSTRUCTIONS:  Do not eat or drink anything after midnight.  This includes water and coffee.  It is okay to brush your teeth.  Do not chew gum or have hard candy or mints.    Take your morning medications as instructed with small sips of water.        Wash both WRISTS with Hibiclens on the night before your angiogram, and on the morning of your angiogram.  .  Be sure to rinse it off.  Do not put Hibiclens directly on your genitals or face.     Do not wear make-up.     You may wear deodorant, but do not wear body lotion, powder or perfume/cologne       Do not wear jewelry.       You do not need money or valuables.  You may bring your cell phone.     If you are going home the same day, you must arrange for someone to drive you home.     Wear comfortable, loose fitting clothes.   .     Call your doctor if you have sickness or fever before your angiogram.     Our number in [facility] is [phone] if you need to contact us.     If you are going home the same day, you must arrange for a family member or a friend to drive you home.  Public transportation is prohibited.  "

## 2019-02-20 NOTE — PLAN OF CARE
Pre-operative instructions, medication directives and pain scales reviewed with Ms Book. All questions the patient had  were answered. Re-assurance about surgical procedure and day of surgery routine given as needed. Ms Book verbalized understanding of the pre-op instructions. Labs done.

## 2019-02-25 ENCOUNTER — HOSPITAL ENCOUNTER (OUTPATIENT)
Facility: HOSPITAL | Age: 59
Discharge: HOME | End: 2019-02-25
Attending: INTERNAL MEDICINE | Admitting: INTERNAL MEDICINE
Payer: COMMERCIAL

## 2019-02-25 VITALS
HEART RATE: 78 BPM | BODY MASS INDEX: 34.49 KG/M2 | TEMPERATURE: 98 F | DIASTOLIC BLOOD PRESSURE: 60 MMHG | HEIGHT: 65 IN | RESPIRATION RATE: 16 BRPM | SYSTOLIC BLOOD PRESSURE: 124 MMHG | WEIGHT: 207 LBS | OXYGEN SATURATION: 96 %

## 2019-02-25 DIAGNOSIS — R94.39 ABNORMAL NUCLEAR STRESS TEST: Primary | ICD-10-CM

## 2019-02-25 DIAGNOSIS — I25.10 CORONARY ARTERY DISEASE INVOLVING NATIVE CORONARY ARTERY OF NATIVE HEART WITHOUT ANGINA PECTORIS: ICD-10-CM

## 2019-02-25 LAB — OHS CV CATH LVEDP PRE: 9

## 2019-02-25 PROCEDURE — C1887 CATHETER, GUIDING: HCPCS | Performed by: INTERNAL MEDICINE

## 2019-02-25 PROCEDURE — 99152 MOD SED SAME PHYS/QHP 5/>YRS: CPT | Performed by: INTERNAL MEDICINE

## 2019-02-25 PROCEDURE — 99152 MOD SED SAME PHYS/QHP 5/>YRS: CPT | Mod: ,,, | Performed by: INTERNAL MEDICINE

## 2019-02-25 PROCEDURE — C1894 INTRO/SHEATH, NON-LASER: HCPCS | Performed by: INTERNAL MEDICINE

## 2019-02-25 PROCEDURE — 63600175 PHARM REV CODE 636 W HCPCS: Performed by: INTERNAL MEDICINE

## 2019-02-25 PROCEDURE — 93458 L HRT ARTERY/VENTRICLE ANGIO: CPT | Mod: 26,,, | Performed by: INTERNAL MEDICINE

## 2019-02-25 PROCEDURE — 25000003 PHARM REV CODE 250: Performed by: INTERNAL MEDICINE

## 2019-02-25 PROCEDURE — 93458 PR CATH PLACE/CORON ANGIO, IMG SUPER/INTERP,W LEFT HEART VENTRICULOGRAPHY: ICD-10-PCS | Mod: 26,,, | Performed by: INTERNAL MEDICINE

## 2019-02-25 PROCEDURE — C1769 GUIDE WIRE: HCPCS | Performed by: INTERNAL MEDICINE

## 2019-02-25 PROCEDURE — 99152 PR MOD CONSCIOUS SEDATION, SAME PHYS, 5+ YRS, FIRST 15 MIN: ICD-10-PCS | Mod: ,,, | Performed by: INTERNAL MEDICINE

## 2019-02-25 PROCEDURE — 93458 L HRT ARTERY/VENTRICLE ANGIO: CPT | Performed by: INTERNAL MEDICINE

## 2019-02-25 RX ORDER — HEPARIN SODIUM 1000 [USP'U]/ML
INJECTION, SOLUTION INTRAVENOUS; SUBCUTANEOUS
Status: DISCONTINUED | OUTPATIENT
Start: 2019-02-25 | End: 2019-02-25 | Stop reason: HOSPADM

## 2019-02-25 RX ORDER — FENTANYL CITRATE 50 UG/ML
INJECTION, SOLUTION INTRAMUSCULAR; INTRAVENOUS
Status: DISCONTINUED | OUTPATIENT
Start: 2019-02-25 | End: 2019-02-25 | Stop reason: HOSPADM

## 2019-02-25 RX ORDER — SODIUM CHLORIDE 9 MG/ML
3 INJECTION, SOLUTION INTRAVENOUS CONTINUOUS
Status: ACTIVE | OUTPATIENT
Start: 2019-02-25 | End: 2019-02-25

## 2019-02-25 RX ORDER — ACETAMINOPHEN 325 MG/1
650 TABLET ORAL EVERY 4 HOURS PRN
Status: DISCONTINUED | OUTPATIENT
Start: 2019-02-25 | End: 2019-02-25 | Stop reason: HOSPADM

## 2019-02-25 RX ORDER — MORPHINE SULFATE 10 MG/ML
2 INJECTION INTRAMUSCULAR; INTRAVENOUS; SUBCUTANEOUS EVERY 10 MIN PRN
Status: DISCONTINUED | OUTPATIENT
Start: 2019-02-25 | End: 2019-02-25 | Stop reason: HOSPADM

## 2019-02-25 RX ORDER — LIDOCAINE HYDROCHLORIDE 10 MG/ML
INJECTION, SOLUTION EPIDURAL; INFILTRATION; INTRACAUDAL; PERINEURAL
Status: DISCONTINUED | OUTPATIENT
Start: 2019-02-25 | End: 2019-02-25 | Stop reason: HOSPADM

## 2019-02-25 RX ORDER — ONDANSETRON 2 MG/ML
4 INJECTION INTRAMUSCULAR; INTRAVENOUS EVERY 12 HOURS PRN
Status: DISCONTINUED | OUTPATIENT
Start: 2019-02-25 | End: 2019-02-25 | Stop reason: HOSPADM

## 2019-02-25 RX ORDER — NITROGLYCERIN 20 MG/100ML
INJECTION INTRAVENOUS
Status: DISCONTINUED | OUTPATIENT
Start: 2019-02-25 | End: 2019-02-25 | Stop reason: HOSPADM

## 2019-02-25 RX ORDER — ONDANSETRON 2 MG/ML
INJECTION INTRAMUSCULAR; INTRAVENOUS
Status: DISCONTINUED | OUTPATIENT
Start: 2019-02-25 | End: 2019-02-25 | Stop reason: HOSPADM

## 2019-02-25 RX ORDER — VERAPAMIL HYDROCHLORIDE 2.5 MG/ML
INJECTION, SOLUTION INTRAVENOUS
Status: DISCONTINUED | OUTPATIENT
Start: 2019-02-25 | End: 2019-02-25 | Stop reason: HOSPADM

## 2019-02-25 RX ORDER — MIDAZOLAM HYDROCHLORIDE 1 MG/ML
INJECTION, SOLUTION INTRAMUSCULAR; INTRAVENOUS
Status: DISCONTINUED | OUTPATIENT
Start: 2019-02-25 | End: 2019-02-25 | Stop reason: HOSPADM

## 2019-02-25 RX ORDER — HYDROCODONE BITARTRATE AND ACETAMINOPHEN 5; 325 MG/1; MG/1
1 TABLET ORAL EVERY 4 HOURS PRN
Status: DISCONTINUED | OUTPATIENT
Start: 2019-02-25 | End: 2019-02-25 | Stop reason: HOSPADM

## 2019-02-25 RX ORDER — ATROPINE SULFATE 0.1 MG/ML
0.5 INJECTION INTRAVENOUS
Status: DISCONTINUED | OUTPATIENT
Start: 2019-02-25 | End: 2019-02-25 | Stop reason: HOSPADM

## 2019-02-25 RX ADMIN — SODIUM CHLORIDE 3 ML/KG/HR: 0.9 INJECTION, SOLUTION INTRAVENOUS at 10:02

## 2019-02-25 RX ADMIN — SODIUM CHLORIDE 500 ML: 0.9 INJECTION, SOLUTION INTRAVENOUS at 12:02

## 2019-02-25 NOTE — Clinical Note
46 ml injected throughout the case. 40 mL total wasted during the case. 86 mL total used in the case.

## 2019-02-25 NOTE — INTERVAL H&P NOTE
The patient has been examined and the H&P has been reviewed:    I concur with the findings and no changes have occurred since H&P was written.    Anesthesia/Surgery risks, benefits and alternative options discussed and understood by patient/family.    Lab Results   Component Value Date    WBC 6.45 02/20/2019    HGB 12.1 02/20/2019    HCT 38.9 02/20/2019    MCV 92 02/20/2019     (H) 02/20/2019     Lab Results   Component Value Date    INR 1.0 02/20/2019     BMP  Lab Results   Component Value Date     02/20/2019    K 3.8 02/20/2019     02/20/2019    CO2 24 02/20/2019    BUN 10 02/20/2019    CREATININE 0.8 02/20/2019    CALCIUM 9.1 02/20/2019    ANIONGAP 8 02/20/2019    ESTGFRAFRICA >60 02/20/2019    EGFRNONAA >60 02/20/2019           Active Hospital Problems    Diagnosis  POA    Coronary artery disease involving native coronary artery of native heart without angina pectoris [I25.10]  Yes      Resolved Hospital Problems   No resolved problems to display.

## 2019-02-25 NOTE — BRIEF OP NOTE
[facility]  Brief Operative Note     SUMMARY     Surgery Date: 2/25/2019     Surgeon(s) and Role:     * Chaitanya Angela MD - Primary    Assisting Surgeon: None    Pre-op Diagnosis:  Coronary artery disease involving native coronary artery of native heart without angina pectoris [I25.10]  Abnormal nuclear stress test [R94.39]    Post-op Diagnosis:  Post-Op Diagnosis Codes:     * Coronary artery disease involving native coronary artery of native heart without angina pectoris [I25.10]     * Abnormal nuclear stress test [R94.39]    Procedure(s) (LRB):  Left heart cath 12pm start, R rad access (Left)    Anesthesia: RN IV Sedation    Description of the findings of the procedure: see below    Findings/Key Components:  LVEDP: 9mmHg  LVEF: 65% with normal wall motion by echo    Dominance: Right  LM: normal  LAD: normal, mid systolic bridge, med caliber vessel  Ramus: normal  LCx: normal  RCA: normal, dom    Hemostasis:  R Radial band    Impression:  CP with abnl MPI  Essentially normal cors  Normal LV fxn by echo  R rad vasband for hemostasis    Plan:  Cont med rx  Stop Plavix  Home today  Follow up with Dr. Angela 1-2 weeks  Consider GI eval if sxs persist    Estimated Blood Loss: <50cc         Specimens: None

## 2019-02-25 NOTE — DISCHARGE INSTRUCTIONS
Limit movement of the wrist.  Do not bend wrist or perform heavy lifting for 24 hours.      NO blood pressure cuff or venipuncture to affected arm for 24 hours.    If bleeding occurs, apply pressure to site for 20 minutes. Apply band aid once bleeding stops.  Call 911 if unable to stop bleeding with pressure.     Keep your follow up appointment.        ANGIOGRAM INSTRUCTIONS                                           Drink plenty of fluids for the next 48 hours and follow your doctor's diet orders.    Rest for the next 72 hours.     Remove the dressing in 24 hours, and you may shower. Clean the area with soap and water, and apply a band aid for the next 5 days.                                                                 No  Lifting over 5-10 lbs., that is, not more than 1 gallon of water, or straining for 72 hours.    Call your doctor for elevated temperature, shortness of breath, chest pain, or cold discolored foot or leg.    If oozing occurs at the injections site, lie down.  Apply pressure with a clean wash cloth for 20 to 30 minutes and call your doctor.    If severe bleeding occurs, lie down, apply pressure.  Call 911 and request an ambulance to take you to the nearest hospital emergency room.    Continue to take your regular medications as instructed.      Follow the instructions in the handout given to you.           Do not drive, drink alcohol, or sign legal documents for 24 hours, or if taking narcotic pain medication.    Fall Prevention  Millions of people fall every year and injure themselves. You may have had anesthesia or sedation which may increase your risk of falling. You may have health issues that put you at an increased risk of falling.     Here are ways to reduce your risk of falling.  ·   · Make your home safe by keeping walkways clear of objects you may trip over.  · Use non-slip pads under rugs. Do not use area rugs or small throw rugs.  · Use non-slip mats in bathtubs and  showers.  · Install handrails and lights on staircases.  · Do not walk in poorly lit areas.  · Do not stand on chairs or wobbly ladders.  · Use caution when reaching overhead or looking upward. This position can cause a loss of balance.  · Be sure your shoes fit properly, have non-slip bottoms and are in good condition.   · Wear shoes both inside and out. Avoid going barefoot or wearing slippers.  · Be cautious when going up and down stairs, curbs, and when walking on uneven sidewalks.  · If your balance is poor, consider using a cane or walker.  · If your fall was related to alcohol use, stop or limit alcohol intake.   · If your fall was related to use of sleeping medicines, talk to your doctor about this. You may need to reduce your dosage at bedtime if you awaken during the night to go to the bathroom.    · To reduce the need for nighttime bathroom trips:  ¨ Avoid drinking fluids for several hours before going to bed  ¨ Empty your bladder before going to bed  ¨ Men can keep a urinal at the bedside  · Stay as active as you can. Balance, flexibility, strength, and endurance all come from exercise. They all play a role in preventing falls. Ask your healthcare provider which types of activity are right for you.  · Get your vision checked on a regular basis.  · If you have pets, know where they are before you stand up or walk so you don't trip over them.  · Use night lights.

## 2019-02-25 NOTE — DISCHARGE SUMMARY
[facility]  Discharge Note    SUMMARY     Admit Date: 2/25/2019    Discharge Date and Time:  02/25/2019 5 PM    Hospital Course (synopsis of major diagnoses, care, treatment, and services provided during the course of the hospital stay): Eneventful LHC/cors via R rad.    Final Diagnosis: Post-Op Diagnosis Codes:     * Coronary artery disease involving native coronary artery of native heart without angina pectoris [I25.10]     * Abnormal nuclear stress test [R94.39]    Disposition: Home or Self Care    Follow Up/Patient Instructions: with Dr. Angela 1-2 weeks as planned    Medications:  Reconciled Home Medications:      Medication List      CHANGE how you take these medications    FLUoxetine 40 MG capsule  Take 1 capsule (40 mg total) by mouth every morning.  What changed:  how much to take        CONTINUE taking these medications    amitriptyline 150 MG Tab  Commonly known as:  ELAVIL  Take 1 tablet (150 mg total) by mouth nightly as needed.     amLODIPine 5 MG tablet  Commonly known as:  NORVASC  Take 1 tablet (5 mg total) by mouth once daily.     aspirin 81 MG EC tablet  Commonly known as:  ECOTRIN  Take 1 tablet (81 mg total) by mouth once daily.     diclofenac sodium 1 % Gel  Commonly known as:  VOLTAREN  Apply 2 g topically 4 (four) times daily.     fluticasone 50 mcg/actuation nasal spray  Commonly known as:  FLONASE  2 sprays by Each Nare route once daily.     gabapentin 300 MG capsule  Commonly known as:  NEURONTIN  Take 2 tablet in the morning and 2 tablet nightly     ibuprofen 600 MG tablet  Commonly known as:  ADVIL,MOTRIN  Take 600 mg by mouth as needed for Pain.     meloxicam 15 MG tablet  Commonly known as:  MOBIC  Take 1 tablet (15 mg total) by mouth once daily. For knee pain     metoprolol tartrate 25 MG tablet  Commonly known as:  LOPRESSOR  Take 1 tablet (25 mg total) by mouth once daily.     montelukast 10 mg tablet  Commonly known as:  SINGULAIR  TAKE 1 TABLET BY MOUTH ONCE  DAILY IN THE EVENING     NEXIUM ORAL  Take by mouth once daily.     potassium chloride SA 20 MEQ tablet  Commonly known as:  K-DUR,KLOR-CON  Take 1 tablet (20 mEq total) by mouth 2 (two) times daily.     VITAMIN D3 ORAL  Take by mouth 2 (two) times daily.        STOP taking these medications    clopidogrel 75 mg tablet  Commonly known as:  PLAVIX          Discharge Procedure Orders   Diet general     Call MD for:  temperature >100.4     Call MD for:  persistent nausea and vomiting     Call MD for:  severe uncontrolled pain     Call MD for:  difficulty breathing, headache or visual disturbances     Call MD for:  redness, tenderness, or signs of infection (pain, swelling, redness, odor or green/yellow discharge around incision site)     Call MD for:  hives     Call MD for:  persistent dizziness or light-headedness     Call MD for:  extreme fatigue     Remove dressing in 24 hours     Activity as tolerated           Diet: cardiac    Activity: ad haja.

## 2019-02-25 NOTE — Clinical Note
The DP pulses are 1+ bilaterally. The PT pulses are 1+ bilaterally. The right radial pulse is 2+ and allens test positive.

## 2019-02-27 ENCOUNTER — OFFICE VISIT (OUTPATIENT)
Dept: FAMILY MEDICINE | Facility: CLINIC | Age: 59
End: 2019-02-27
Payer: COMMERCIAL

## 2019-02-27 VITALS
SYSTOLIC BLOOD PRESSURE: 134 MMHG | HEIGHT: 65 IN | TEMPERATURE: 99 F | RESPIRATION RATE: 20 BRPM | OXYGEN SATURATION: 95 % | WEIGHT: 207.88 LBS | DIASTOLIC BLOOD PRESSURE: 80 MMHG | BODY MASS INDEX: 34.63 KG/M2 | HEART RATE: 79 BPM

## 2019-02-27 DIAGNOSIS — K21.9 GASTROESOPHAGEAL REFLUX DISEASE, ESOPHAGITIS PRESENCE NOT SPECIFIED: Primary | ICD-10-CM

## 2019-02-27 DIAGNOSIS — R07.9 CHEST PAIN, UNSPECIFIED TYPE: ICD-10-CM

## 2019-02-27 DIAGNOSIS — J32.9 CHRONIC SINUSITIS, UNSPECIFIED LOCATION: ICD-10-CM

## 2019-02-27 DIAGNOSIS — J30.2 SEASONAL ALLERGIC RHINITIS, UNSPECIFIED TRIGGER: ICD-10-CM

## 2019-02-27 PROCEDURE — 3008F PR BODY MASS INDEX (BMI) DOCUMENTED: ICD-10-PCS | Mod: CPTII,S$GLB,, | Performed by: FAMILY MEDICINE

## 2019-02-27 PROCEDURE — 96372 THER/PROPH/DIAG INJ SC/IM: CPT | Mod: S$GLB,,, | Performed by: FAMILY MEDICINE

## 2019-02-27 PROCEDURE — 3008F BODY MASS INDEX DOCD: CPT | Mod: CPTII,S$GLB,, | Performed by: FAMILY MEDICINE

## 2019-02-27 PROCEDURE — 3079F DIAST BP 80-89 MM HG: CPT | Mod: CPTII,S$GLB,, | Performed by: FAMILY MEDICINE

## 2019-02-27 PROCEDURE — 99999 PR PBB SHADOW E&M-EST. PATIENT-LVL V: CPT | Mod: PBBFAC,,, | Performed by: FAMILY MEDICINE

## 2019-02-27 PROCEDURE — 99214 OFFICE O/P EST MOD 30 MIN: CPT | Mod: 25,S$GLB,, | Performed by: FAMILY MEDICINE

## 2019-02-27 PROCEDURE — 3075F SYST BP GE 130 - 139MM HG: CPT | Mod: CPTII,S$GLB,, | Performed by: FAMILY MEDICINE

## 2019-02-27 PROCEDURE — 3075F PR MOST RECENT SYSTOLIC BLOOD PRESS GE 130-139MM HG: ICD-10-PCS | Mod: CPTII,S$GLB,, | Performed by: FAMILY MEDICINE

## 2019-02-27 PROCEDURE — 99214 PR OFFICE/OUTPT VISIT, EST, LEVL IV, 30-39 MIN: ICD-10-PCS | Mod: 25,S$GLB,, | Performed by: FAMILY MEDICINE

## 2019-02-27 PROCEDURE — 3079F PR MOST RECENT DIASTOLIC BLOOD PRESSURE 80-89 MM HG: ICD-10-PCS | Mod: CPTII,S$GLB,, | Performed by: FAMILY MEDICINE

## 2019-02-27 PROCEDURE — 96372 PR INJECTION,THERAP/PROPH/DIAG2ST, IM OR SUBCUT: ICD-10-PCS | Mod: S$GLB,,, | Performed by: FAMILY MEDICINE

## 2019-02-27 PROCEDURE — 99999 PR PBB SHADOW E&M-EST. PATIENT-LVL V: ICD-10-PCS | Mod: PBBFAC,,, | Performed by: FAMILY MEDICINE

## 2019-02-27 RX ORDER — OMEPRAZOLE 40 MG/1
40 CAPSULE, DELAYED RELEASE ORAL
Qty: 180 CAPSULE | Refills: 1 | Status: SHIPPED | OUTPATIENT
Start: 2019-02-27 | End: 2019-08-20 | Stop reason: SDUPTHER

## 2019-02-27 RX ORDER — AZELASTINE 1 MG/ML
1 SPRAY, METERED NASAL 2 TIMES DAILY
Qty: 30 ML | Refills: 2 | Status: SHIPPED | OUTPATIENT
Start: 2019-02-27 | End: 2019-07-31

## 2019-02-27 NOTE — PROGRESS NOTES
Pt tolerated injection of solumedrol 125mg to right ventrogluteal without difficulty; no adverse reaction noted

## 2019-02-27 NOTE — PROGRESS NOTES
Subjective:       Patient ID: Alyce Zurita is a 58 y.o. female.    Chief Complaint: Acute non-recurrent maxillary sinusitis (6 wks follow up )    HPI    CP with SOB - pt had cath on Monday which revealed no blockages. Dr Coreen perkins'd that pt should see GI. Pt adherent with nexium daily.         Sinus congestion - onset 1 week ago of sinus congestion a/w facial presure that has worsened since the onset. She is on flonase, singulair, but stopped zyrtec.         htn - stable - pt is adherent with bp medications. No side effects.       Current Outpatient Medications on File Prior to Visit   Medication Sig Dispense Refill    amitriptyline (ELAVIL) 150 MG Tab Take 1 tablet (150 mg total) by mouth nightly as needed. 90 tablet 3    amLODIPine (NORVASC) 5 MG tablet Take 1 tablet (5 mg total) by mouth once daily. 90 tablet 3    aspirin (ECOTRIN) 81 MG EC tablet Take 1 tablet (81 mg total) by mouth once daily. 90 tablet 3    cholecalciferol, vitamin D3, (VITAMIN D3 ORAL) Take by mouth 2 (two) times daily.      diclofenac sodium (VOLTAREN) 1 % Gel Apply 2 g topically 4 (four) times daily. 100 g 4    FLUoxetine (PROZAC) 40 MG capsule Take 1 capsule (40 mg total) by mouth every morning. (Patient taking differently: Take 20 mg by mouth every morning. ) 90 capsule 1    fluticasone (FLONASE) 50 mcg/actuation nasal spray 2 sprays by Each Nare route once daily.      gabapentin (NEURONTIN) 300 MG capsule Take 2 tablet in the morning and 2 tablet nightly 360 capsule 3    ibuprofen (ADVIL,MOTRIN) 600 MG tablet Take 600 mg by mouth as needed for Pain.      meloxicam (MOBIC) 15 MG tablet Take 1 tablet (15 mg total) by mouth once daily. For knee pain 30 tablet 1    metoprolol tartrate (LOPRESSOR) 25 MG tablet Take 1 tablet (25 mg total) by mouth once daily. 90 tablet 1    montelukast (SINGULAIR) 10 mg tablet TAKE 1 TABLET BY MOUTH ONCE DAILY IN THE EVENING 90 tablet 1    potassium chloride SA (K-DUR,KLOR-CON) 20 MEQ tablet  Take 1 tablet (20 mEq total) by mouth 2 (two) times daily. 90 tablet 0    [DISCONTINUED] esomeprazole magnesium (NEXIUM ORAL) Take by mouth once daily.       No current facility-administered medications on file prior to visit.        Past Medical History:   Diagnosis Date    Allergy     Anticoagulant long-term use     Anxiety 11/16/2018    Arthritis     Breast injury     left 10 yrs ago/ hit in chest    Carpal tunnel syndrome of left wrist 9/10/2018    GERD (gastroesophageal reflux disease)     Hyperlipemia     Hypertension, uncontrolled 8/17/2018    Interstitial cystitis     Kidney problem     Meningitis     3 months old    PONV (postoperative nausea and vomiting)     pt needs meds for PONV    Tachycardia        Family History   Problem Relation Age of Onset    Arthritis Mother     Glaucoma Mother     Breast cancer Mother     Asthma Daughter     Breast cancer Maternal Aunt     Breast cancer Maternal Grandmother     Breast cancer Paternal Grandmother     Breast cancer Maternal Aunt         reports that  has never smoked. she has never used smokeless tobacco. She reports that she does not drink alcohol or use drugs.    Review of Systems   Constitutional: Negative for fever.   Respiratory: Positive for shortness of breath.    Cardiovascular: Positive for chest pain.   Gastrointestinal: Negative for nausea and vomiting.        GERD       Objective:     Vitals:    02/27/19 0852   BP: 134/80   Pulse: 79   Resp: 20   Temp: 98.8 °F (37.1 °C)        Physical Exam   Constitutional: She appears well-developed. No distress.   O   HENT:   Head: Normocephalic and atraumatic.   Eyes: Conjunctivae are normal. No scleral icterus.   Pulmonary/Chest: Effort normal.   Neurological: She is alert.   Skin: She is not diaphoretic.   Psychiatric: She has a normal mood and affect. Her behavior is normal.   Vitals reviewed.      Assessment:       1. Gastroesophageal reflux disease, esophagitis presence not specified     2. Seasonal allergic rhinitis, unspecified trigger    3. Chest pain, unspecified type    4. Chronic sinusitis, unspecified location        Plan:       Alyce was seen today for acute non-recurrent maxillary sinusitis.    Diagnoses and all orders for this visit:    Gastroesophageal reflux disease, esophagitis presence not specified  -     Ambulatory consult to Gastroenterology  -     omeprazole (PRILOSEC) 40 MG capsule; Take 1 capsule (40 mg total) by mouth 2 (two) times daily before meals.  Will increase PPI to BID and refer to GI as cards requested to review GERD as cause for CP and SOB    Seasonal allergic rhinitis, unspecified trigger  -     azelastine (ASTELIN) 137 mcg (0.1 %) nasal spray; 1 spray (137 mcg total) by Nasal route 2 (two) times daily.  -     methylPREDNISolone sod suc(PF) injection 125 mg  Will add astelin to flonase BID, zyrtec and singulair. If not effective will refer to ENT.    Chest pain, unspecified type  See #1    Chronic sinusitis, unspecified location  See #2 - steroid shot today. Last abx were given 6 weeks ago.             Follow-up in about 3 months (around 5/27/2019) for Hypertension, anxiety, chronci sinus, gerd.        Pt verbalized understanding and agreed with our plan.

## 2019-03-04 ENCOUNTER — OFFICE VISIT (OUTPATIENT)
Dept: CARDIOLOGY | Facility: CLINIC | Age: 59
End: 2019-03-04
Payer: COMMERCIAL

## 2019-03-04 VITALS
SYSTOLIC BLOOD PRESSURE: 168 MMHG | WEIGHT: 207.25 LBS | DIASTOLIC BLOOD PRESSURE: 100 MMHG | OXYGEN SATURATION: 94 % | RESPIRATION RATE: 16 BRPM | BODY MASS INDEX: 34.49 KG/M2 | HEART RATE: 93 BPM

## 2019-03-04 DIAGNOSIS — R06.09 DOE (DYSPNEA ON EXERTION): ICD-10-CM

## 2019-03-04 DIAGNOSIS — E66.9 NON MORBID OBESITY, UNSPECIFIED OBESITY TYPE: ICD-10-CM

## 2019-03-04 DIAGNOSIS — R00.2 HEART PALPITATIONS: ICD-10-CM

## 2019-03-04 DIAGNOSIS — I10 ESSENTIAL HYPERTENSION: Primary | ICD-10-CM

## 2019-03-04 DIAGNOSIS — R94.39 ABNORMAL NUCLEAR STRESS TEST: ICD-10-CM

## 2019-03-04 DIAGNOSIS — I47.19 AVNRT (AV NODAL RE-ENTRY TACHYCARDIA): ICD-10-CM

## 2019-03-04 PROCEDURE — 99999 PR PBB SHADOW E&M-EST. PATIENT-LVL III: CPT | Mod: PBBFAC,,, | Performed by: INTERNAL MEDICINE

## 2019-03-04 PROCEDURE — 99214 OFFICE O/P EST MOD 30 MIN: CPT | Mod: S$GLB,,, | Performed by: INTERNAL MEDICINE

## 2019-03-04 PROCEDURE — 3008F BODY MASS INDEX DOCD: CPT | Mod: CPTII,S$GLB,, | Performed by: INTERNAL MEDICINE

## 2019-03-04 PROCEDURE — 3077F SYST BP >= 140 MM HG: CPT | Mod: CPTII,S$GLB,, | Performed by: INTERNAL MEDICINE

## 2019-03-04 PROCEDURE — 3080F PR MOST RECENT DIASTOLIC BLOOD PRESSURE >= 90 MM HG: ICD-10-PCS | Mod: CPTII,S$GLB,, | Performed by: INTERNAL MEDICINE

## 2019-03-04 PROCEDURE — 3077F PR MOST RECENT SYSTOLIC BLOOD PRESSURE >= 140 MM HG: ICD-10-PCS | Mod: CPTII,S$GLB,, | Performed by: INTERNAL MEDICINE

## 2019-03-04 PROCEDURE — 3080F DIAST BP >= 90 MM HG: CPT | Mod: CPTII,S$GLB,, | Performed by: INTERNAL MEDICINE

## 2019-03-04 PROCEDURE — 3008F PR BODY MASS INDEX (BMI) DOCUMENTED: ICD-10-PCS | Mod: CPTII,S$GLB,, | Performed by: INTERNAL MEDICINE

## 2019-03-04 PROCEDURE — 99999 PR PBB SHADOW E&M-EST. PATIENT-LVL III: ICD-10-PCS | Mod: PBBFAC,,, | Performed by: INTERNAL MEDICINE

## 2019-03-04 PROCEDURE — 99214 PR OFFICE/OUTPT VISIT, EST, LEVL IV, 30-39 MIN: ICD-10-PCS | Mod: S$GLB,,, | Performed by: INTERNAL MEDICINE

## 2019-03-04 RX ORDER — AMLODIPINE BESYLATE 10 MG/1
10 TABLET ORAL DAILY
Qty: 90 TABLET | Refills: 3 | Status: SHIPPED | OUTPATIENT
Start: 2019-03-04 | End: 2019-06-17

## 2019-03-04 NOTE — PROGRESS NOTES
CARDIOVASCULAR PROGRESS NOTE    REASON FOR CONSULT:   Alyce Zurita is a 58 y.o. female who presents for f/u of [facility], testing, cath.    PCP: Giovanni  HISTORY OF PRESENT ILLNESS:   The patient returns for follow-up.  In the interim since her last office visit, she underwent left heart catheterization which was normal.  She denies any recurrent chest discomfort or throat pain.  She does continue to have shortness of breath.  She has had no palpitations, lightheadedness, dizziness, or syncope.  There has been no PND, orthopnea, or lower extremity edema.  She denies melena, hematuria, or claudicant symptoms.  There been no complications related to the right radial access site for her catheterization.    CARDIOVASCULAR HISTORY:   ?CAD (MPI 1/2019 with ant isch)  AVNRT s/p ablation 2/2017 ([facility])    PAST MEDICAL HISTORY:     Past Medical History:   Diagnosis Date    Allergy     Anticoagulant long-term use     Anxiety 11/16/2018    Arthritis     Breast injury     left 10 yrs ago/ hit in chest    Carpal tunnel syndrome of left wrist 9/10/2018    GERD (gastroesophageal reflux disease)     Hyperlipemia     Hypertension, uncontrolled 8/17/2018    Interstitial cystitis     Kidney problem     Meningitis     3 months old    PONV (postoperative nausea and vomiting)     pt needs meds for PONV    Tachycardia        PAST SURGICAL HISTORY:     Past Surgical History:   Procedure Laterality Date    APPENDECTOMY      CARDIAC SURGERY      ABLATION    Heart Procedure       HYSTERECTOMY  1993    KIDNEY SURGERY      Left heart cath 12pm start, R rad access Left 2/25/2019    Performed by [facility] at [facility] C[facility]    OOPHORECTOMY Bilateral 1993    TEMPOROMANDIBULAR JOINT SURGERY         ALLERGIES AND MEDICATION:     Review of patient's allergies indicates:   Allergen Reactions    Rofecoxib Nausea And Vomiting    Other omega-3s Nausea And Vomiting     chlorine        Medication List           Accurate as of  3/4/19  2:02 PM. If you have any questions, ask your nurse or doctor.               CHANGE how you take these medications    FLUoxetine 40 MG capsule  Take 1 capsule (40 mg total) by mouth every morning.  What changed:  how much to take        CONTINUE taking these medications    amitriptyline 150 MG Tab  Commonly known as:  ELAVIL  Take 1 tablet (150 mg total) by mouth nightly as needed.     amLODIPine 5 MG tablet  Commonly known as:  NORVASC  Take 1 tablet (5 mg total) by mouth once daily.     aspirin 81 MG EC tablet  Commonly known as:  ECOTRIN  Take 1 tablet (81 mg total) by mouth once daily.     azelastine 137 mcg (0.1 %) nasal spray  Commonly known as:  ASTELIN  1 spray (137 mcg total) by Nasal route 2 (two) times daily.     diclofenac sodium 1 % Gel  Commonly known as:  VOLTAREN  Apply 2 g topically 4 (four) times daily.     fluticasone 50 mcg/actuation nasal spray  Commonly known as:  FLONASE     gabapentin 300 MG capsule  Commonly known as:  NEURONTIN  Take 2 tablet in the morning and 2 tablet nightly     ibuprofen 600 MG tablet  Commonly known as:  ADVIL,MOTRIN     meloxicam 15 MG tablet  Commonly known as:  MOBIC  Take 1 tablet (15 mg total) by mouth once daily. For knee pain     metoprolol tartrate 25 MG tablet  Commonly known as:  LOPRESSOR  Take 1 tablet (25 mg total) by mouth once daily.     montelukast 10 mg tablet  Commonly known as:  SINGULAIR  TAKE 1 TABLET BY MOUTH ONCE DAILY IN THE EVENING     omeprazole 40 MG capsule  Commonly known as:  PRILOSEC  Take 1 capsule (40 mg total) by mouth 2 (two) times daily before meals.     potassium chloride SA 20 MEQ tablet  Commonly known as:  K-DUR,KLOR-CON  Take 1 tablet (20 mEq total) by mouth 2 (two) times daily.     VITAMIN D3 ORAL            SOCIAL HISTORY:     Social History     Socioeconomic History    Marital status:      Spouse name: Not on file    Number of children: Not on file    Years of education: Not on file    Highest education  level: Not on file   Social Needs    Financial resource strain: Not on file    Food insecurity - worry: Not on file    Food insecurity - inability: Not on file    Transportation needs - medical: Not on file    Transportation needs - non-medical: Not on file   Occupational History    Not on file   Tobacco Use    Smoking status: Never Smoker    Smokeless tobacco: Never Used   Substance and Sexual Activity    Alcohol use: No    Drug use: No    Sexual activity: Yes     Partners: Male     Birth control/protection: None     Comment: 7/20/18  with same partner for 40 years    Other Topics Concern    Not on file   Social History Narrative    Not on file       FAMILY HISTORY:     Family History   Problem Relation Age of Onset    Arthritis Mother     Glaucoma Mother     Breast cancer Mother     Asthma Daughter     Breast cancer Maternal Aunt     Breast cancer Maternal Grandmother     Breast cancer Paternal Grandmother     Breast cancer Maternal Aunt        REVIEW OF SYSTEMS:   Review of Systems   Constitutional: Negative for chills, diaphoresis and fever.   HENT: Negative for nosebleeds.    Eyes: Negative for blurred vision, double vision and photophobia.   Respiratory: Positive for shortness of breath. Negative for hemoptysis and wheezing.    Cardiovascular: Negative for chest pain, palpitations, orthopnea, claudication, leg swelling and PND.   Gastrointestinal: Negative for abdominal pain, blood in stool, heartburn, melena, nausea and vomiting.   Genitourinary: Negative for flank pain and hematuria.   Musculoskeletal: Negative for falls, myalgias and neck pain.   Skin: Negative for rash.   Neurological: Negative for dizziness, seizures, loss of consciousness, weakness and headaches.   Endo/Heme/Allergies: Negative for polydipsia. Does not bruise/bleed easily.   Psychiatric/Behavioral: Negative for depression and memory loss. The patient is not nervous/anxious.        PHYSICAL EXAM:     Vitals:     03/04/19 1336   BP: (!) 168/100   Pulse: 93   Resp: 16    Body mass index is 34.49 kg/m².  Weight: 94 kg (207 lb 3.7 oz)         Physical Exam   Constitutional: She is oriented to person, place, and time. She appears well-developed and well-nourished. She is cooperative.  Non-toxic appearance. No distress.   HENT:   Head: Normocephalic and atraumatic.   Eyes: Conjunctivae and EOM are normal. Pupils are equal, round, and reactive to light. No scleral icterus.   Neck: Trachea normal and normal range of motion. Neck supple. Normal carotid pulses and no JVD present. Carotid bruit is not present. No neck rigidity. No tracheal deviation and no edema present. No thyromegaly present.   Cardiovascular: Normal rate, regular rhythm, S1 normal and S2 normal. PMI is not displaced. Exam reveals no gallop and no friction rub.   No murmur heard.  Pulses:       Carotid pulses are 2+ on the right side, and 2+ on the left side.  R rad access site c/d/i, mild ecchymosis, no hematoma.   Pulmonary/Chest: Effort normal and breath sounds normal. No stridor. No respiratory distress. She has no wheezes. She has no rales. She exhibits no tenderness.   Abdominal: Soft. She exhibits no distension. There is no hepatosplenomegaly.   obese   Musculoskeletal: Normal range of motion. She exhibits no edema or tenderness.   Feet:   Right Foot:   Skin Integrity: Negative for ulcer.   Left Foot:   Skin Integrity: Negative for ulcer.   Neurological: She is alert and oriented to person, place, and time. No cranial nerve deficit.   Skin: Skin is warm and dry. No rash noted. No erythema.   Psychiatric: She has a normal mood and affect. Her speech is normal and behavior is normal.   Vitals reviewed.      DATA:   EKG: (personally reviewed tracing)  1/23/19 SR 83, AWMI/IMI age indet    Laboratory:  CBC:  Recent Labs   Lab 08/23/18  1407 02/20/19  0910   WHITE BLOOD CELL COUNT 9.12 6.45   HEMOGLOBIN 12.0 12.1   HEMATOCRIT 39.3 38.9   PLATELETS 305 352 H        CHEMISTRIES:  Recent Labs   Lab 08/23/18  1407 11/27/18  1400 02/20/19  0910   GLUCOSE 114 H 82 131 H   SODIUM 141 142 138   POTASSIUM 3.0 L 3.3 L 3.8   BUN BLD 14 12 10   CREATININE 0.9 0.8 0.8   EGFR IF AFRICAN AMERICAN >60.0 >60.0 >60   EGFR IF NON- >60.0 >60.0 >60   CALCIUM 9.6 9.3 9.1       CARDIAC BIOMARKERS:        COAGS:  Recent Labs   Lab 02/20/19  0910   INR 1.0       LIPIDS/LFTS:  Recent Labs   Lab 06/14/17 08/23/18  1407 11/27/18  1400   CHOLESTEROL  --  231 H  --    TRIGLYCERIDES 120 140  --    HDL 44 46  --    LDL CHOLESTEROL 116 157.0  --    NON-HDL CHOLESTEROL  --  185  --    AST  --  24 20   ALT  --  20 19     Lab Results   Component Value Date    TSH 1.319 11/27/2018     The 10-year ASCVD risk score (Lovely YANG Jr., et al., 2013) is: 8%    Values used to calculate the score:      Age: 58 years      Sex: Female      Is Non- : No      Diabetic: No      Tobacco smoker: No      Systolic Blood Pressure: 168 mmHg      Is BP treated: Yes      HDL Cholesterol: 46 mg/dL      Total Cholesterol: 231 mg/dL      Cardiovascular Testing:  Cath 2/25/19  LVEDP: 9mmHg  LVEF: 65% with normal wall motion by echo  Dominance: Right  LM: normal  LAD: normal, mid systolic bridge, med caliber vessel  Ramus: normal  LCx: normal  RCA: normal, dom  Hemostasis:  R Radial band  Impression:  CP with abnl MPI  Essentially normal cors  Normal LV fxn by echo  R rad vasband for hemostasis  Plan:  Cont med rx  Stop Plavix  Home today  Follow up with Dr. Angela 1-2 weeks  Consider GI eval if sxs persist    Holter 2/11/19  Sinus rhythm with occ PACs and PVCs.  Diary not returned.    Ex MPI 2/1/19   There is a mild, ischemia defect(s) in the anteroapical wall(s),  An ejection fraction of 67 % at rest  The EKG portion of this study is positive for myocardial ischemia. (Fer 7:39, 9 METS, 92% MPHR, -CP, +EKG (1mm downsloping ST depression))    Echo 2/1/19  Normal left ventricular systolic  function. The estimated ejection fraction is 65%  Concentric left ventricular hypertrophy.  Normal LV diastolic function.  Normal right ventricular systolic function.  Mild pulmonic regurgitation.    ASSESSMENT:   # [facility]/CP, todays description of throat tightness more concerning for angina.  MPI 2/2019 with anterior ischemia.  Cath 2/2019 with essentially normal cors.  # abnl EKG  # HTN, uncontrolled  # palps, Holter 2/2019 neg.  # hx AVNRT s/p ablation  2/1/17 ([facility])  # BMI 34, up 1 unit vs last OV    PLAN:   Cont med rx  Inc amlod 10mg qd  Follow up with GI/PCP as planned  RTC prn      Chaitanya Angela MD, FACC

## 2019-04-03 ENCOUNTER — PATIENT MESSAGE (OUTPATIENT)
Dept: CARDIOLOGY | Facility: CLINIC | Age: 59
End: 2019-04-03

## 2019-04-04 ENCOUNTER — TELEPHONE (OUTPATIENT)
Dept: CARDIOLOGY | Facility: CLINIC | Age: 59
End: 2019-04-04

## 2019-04-08 ENCOUNTER — TELEPHONE (OUTPATIENT)
Dept: FAMILY MEDICINE | Facility: CLINIC | Age: 59
End: 2019-04-08

## 2019-04-08 DIAGNOSIS — G89.29 CHRONIC NECK PAIN: ICD-10-CM

## 2019-04-08 DIAGNOSIS — M54.2 CHRONIC NECK PAIN: ICD-10-CM

## 2019-04-08 DIAGNOSIS — F41.9 ANXIETY: ICD-10-CM

## 2019-04-08 RX ORDER — IBUPROFEN 600 MG/1
600 TABLET ORAL
Qty: 60 TABLET | Refills: 0 | Status: SHIPPED | OUTPATIENT
Start: 2019-04-08 | End: 2019-04-09 | Stop reason: SDUPTHER

## 2019-04-08 RX ORDER — FLUOXETINE HYDROCHLORIDE 40 MG/1
40 CAPSULE ORAL EVERY MORNING
Qty: 90 CAPSULE | Refills: 1 | Status: SHIPPED | OUTPATIENT
Start: 2019-04-08 | End: 2019-04-22 | Stop reason: SDUPTHER

## 2019-04-08 NOTE — TELEPHONE ENCOUNTER
----- Message from Cal Chris sent at 4/8/2019  9:04 AM CDT -----  Contact: Alyce [phone]  Type: RX Refill Request    Who Called: Alyce    Refill or New Rx: Refill    RX Name and Strength:ibuprofen (ADVIL,MOTRIN) 600 MG tablet    Is this a 30 day or 90 day RX:30 day supply    Preferred Pharmacy with phone number: [facility] - [place], [place] - [address]    Ordering Provider:Dr. Davila    Would the patient rather a call back or a response via [facility]? No response necessary    Best Call Back Number:[phone]

## 2019-04-08 NOTE — TELEPHONE ENCOUNTER
----- Message from Yaneli Demarco sent at 4/8/2019 12:52 PM CDT -----  Contact: [facility] [phone]  [facility] requesting to verify a script for     ibuprofen (ADVIL,MOTRIN) 600 MG tablet      FLUoxetine 40 MG capsule     PLs call..    [facility] - [place], [place] - [address] [place] [address]    Thanks......nito  Phone: [phone] Fax: [phone]     Thanks........

## 2019-04-08 NOTE — TELEPHONE ENCOUNTER
Pharmacy need clarification     On the ibu-frequency of med?  ibu- fluoxetine together, risk for GI bleed, continue to fill?

## 2019-04-09 ENCOUNTER — LAB VISIT (OUTPATIENT)
Dept: LAB | Facility: HOSPITAL | Age: 59
End: 2019-04-09
Attending: INTERNAL MEDICINE
Payer: COMMERCIAL

## 2019-04-09 DIAGNOSIS — K21.9 GASTROESOPHAGEAL REFLUX DISEASE: ICD-10-CM

## 2019-04-09 DIAGNOSIS — R10.13 ABDOMINAL PAIN, EPIGASTRIC: Primary | ICD-10-CM

## 2019-04-09 DIAGNOSIS — R10.11 ABDOMINAL PAIN, RIGHT UPPER QUADRANT: ICD-10-CM

## 2019-04-09 PROCEDURE — 80074 ACUTE HEPATITIS PANEL: CPT

## 2019-04-09 PROCEDURE — 36415 COLL VENOUS BLD VENIPUNCTURE: CPT

## 2019-04-09 RX ORDER — IBUPROFEN 600 MG/1
600 TABLET ORAL
Qty: 60 TABLET | Refills: 0 | Status: SHIPPED | OUTPATIENT
Start: 2019-04-09 | End: 2019-04-24

## 2019-04-10 LAB
HAV IGM SERPL QL IA: NEGATIVE
HBV CORE IGM SERPL QL IA: NEGATIVE
HBV SURFACE AG SERPL QL IA: NEGATIVE
HCV AB SERPL QL IA: NEGATIVE

## 2019-04-21 ENCOUNTER — PATIENT MESSAGE (OUTPATIENT)
Dept: CARDIOLOGY | Facility: CLINIC | Age: 59
End: 2019-04-21

## 2019-04-21 ENCOUNTER — PATIENT MESSAGE (OUTPATIENT)
Dept: FAMILY MEDICINE | Facility: CLINIC | Age: 59
End: 2019-04-21

## 2019-04-21 DIAGNOSIS — F41.9 ANXIETY: ICD-10-CM

## 2019-04-22 DIAGNOSIS — M25.562 ACUTE PAIN OF LEFT KNEE: ICD-10-CM

## 2019-04-22 RX ORDER — FLUOXETINE HYDROCHLORIDE 20 MG/1
20 CAPSULE ORAL EVERY MORNING
Qty: 90 CAPSULE | Refills: 1 | Status: SHIPPED | OUTPATIENT
Start: 2019-04-22 | End: 2019-07-12 | Stop reason: SDUPTHER

## 2019-04-24 RX ORDER — MELOXICAM 15 MG/1
7.5 TABLET ORAL DAILY PRN
Qty: 30 TABLET | Refills: 1 | Status: SHIPPED | OUTPATIENT
Start: 2019-04-24 | End: 2019-06-07

## 2019-05-08 RX ORDER — POTASSIUM CHLORIDE 20 MEQ/1
20 TABLET, EXTENDED RELEASE ORAL 2 TIMES DAILY
Qty: 90 TABLET | Refills: 0 | Status: SHIPPED | OUTPATIENT
Start: 2019-05-08 | End: 2019-07-22 | Stop reason: SDUPTHER

## 2019-05-24 ENCOUNTER — PATIENT OUTREACH (OUTPATIENT)
Dept: ADMINISTRATIVE | Facility: HOSPITAL | Age: 59
End: 2019-05-24

## 2019-06-07 ENCOUNTER — HOSPITAL ENCOUNTER (OUTPATIENT)
Dept: RADIOLOGY | Facility: HOSPITAL | Age: 59
Discharge: HOME | End: 2019-06-07
Attending: FAMILY MEDICINE
Payer: COMMERCIAL

## 2019-06-07 ENCOUNTER — OFFICE VISIT (OUTPATIENT)
Dept: FAMILY MEDICINE | Facility: CLINIC | Age: 59
End: 2019-06-07
Payer: COMMERCIAL

## 2019-06-07 VITALS
RESPIRATION RATE: 16 BRPM | OXYGEN SATURATION: 96 % | DIASTOLIC BLOOD PRESSURE: 70 MMHG | HEIGHT: 65 IN | TEMPERATURE: 99 F | HEART RATE: 62 BPM | BODY MASS INDEX: 32.91 KG/M2 | SYSTOLIC BLOOD PRESSURE: 100 MMHG | WEIGHT: 197.56 LBS

## 2019-06-07 DIAGNOSIS — I95.9 HYPOTENSION, UNSPECIFIED HYPOTENSION TYPE: ICD-10-CM

## 2019-06-07 DIAGNOSIS — M79.644 FINGER PAIN, RIGHT: ICD-10-CM

## 2019-06-07 DIAGNOSIS — K57.92 DIVERTICULITIS: ICD-10-CM

## 2019-06-07 DIAGNOSIS — R10.9 FUNCTIONAL ABDOMINAL PAIN SYNDROME: ICD-10-CM

## 2019-06-07 DIAGNOSIS — M79.644 FINGER PAIN, RIGHT: Primary | ICD-10-CM

## 2019-06-07 DIAGNOSIS — F51.01 PRIMARY INSOMNIA: ICD-10-CM

## 2019-06-07 PROCEDURE — 73140 XR FINGER 2 OR MORE VIEWS: ICD-10-PCS | Mod: 26,,, | Performed by: RADIOLOGY

## 2019-06-07 PROCEDURE — 3008F PR BODY MASS INDEX (BMI) DOCUMENTED: ICD-10-PCS | Mod: CPTII,S$GLB,, | Performed by: FAMILY MEDICINE

## 2019-06-07 PROCEDURE — 73140 X-RAY EXAM OF FINGER(S): CPT | Mod: TC,FY,PO

## 2019-06-07 PROCEDURE — 3078F PR MOST RECENT DIASTOLIC BLOOD PRESSURE < 80 MM HG: ICD-10-PCS | Mod: CPTII,S$GLB,, | Performed by: FAMILY MEDICINE

## 2019-06-07 PROCEDURE — 3074F SYST BP LT 130 MM HG: CPT | Mod: CPTII,S$GLB,, | Performed by: FAMILY MEDICINE

## 2019-06-07 PROCEDURE — 99999 PR PBB SHADOW E&M-EST. PATIENT-LVL V: ICD-10-PCS | Mod: PBBFAC,,, | Performed by: FAMILY MEDICINE

## 2019-06-07 PROCEDURE — 3008F BODY MASS INDEX DOCD: CPT | Mod: CPTII,S$GLB,, | Performed by: FAMILY MEDICINE

## 2019-06-07 PROCEDURE — 3074F PR MOST RECENT SYSTOLIC BLOOD PRESSURE < 130 MM HG: ICD-10-PCS | Mod: CPTII,S$GLB,, | Performed by: FAMILY MEDICINE

## 2019-06-07 PROCEDURE — 73140 X-RAY EXAM OF FINGER(S): CPT | Mod: 26,,, | Performed by: RADIOLOGY

## 2019-06-07 PROCEDURE — 3078F DIAST BP <80 MM HG: CPT | Mod: CPTII,S$GLB,, | Performed by: FAMILY MEDICINE

## 2019-06-07 PROCEDURE — 99999 PR PBB SHADOW E&M-EST. PATIENT-LVL V: CPT | Mod: PBBFAC,,, | Performed by: FAMILY MEDICINE

## 2019-06-07 PROCEDURE — 99214 OFFICE O/P EST MOD 30 MIN: CPT | Mod: S$GLB,,, | Performed by: FAMILY MEDICINE

## 2019-06-07 PROCEDURE — 99214 PR OFFICE/OUTPT VISIT, EST, LEVL IV, 30-39 MIN: ICD-10-PCS | Mod: S$GLB,,, | Performed by: FAMILY MEDICINE

## 2019-06-07 RX ORDER — HYDROCHLOROTHIAZIDE 12.5 MG/1
12.5 CAPSULE ORAL DAILY
Qty: 90 CAPSULE | Refills: 2 | Status: CANCELLED | OUTPATIENT
Start: 2019-06-07

## 2019-06-07 RX ORDER — HYDROCHLOROTHIAZIDE 12.5 MG/1
12.5 CAPSULE ORAL DAILY
COMMUNITY
Start: 2019-03-11 | End: 2019-06-17 | Stop reason: SDUPTHER

## 2019-06-07 RX ORDER — DICYCLOMINE HYDROCHLORIDE 10 MG/1
10 CAPSULE ORAL 3 TIMES DAILY PRN
Qty: 90 CAPSULE | Refills: 1 | Status: SHIPPED | OUTPATIENT
Start: 2019-06-07 | End: 2019-09-09 | Stop reason: SDUPTHER

## 2019-06-07 RX ORDER — HYOSCYAMINE SULFATE 0.38 MG/1
TABLET, EXTENDED RELEASE ORAL
Qty: 180 TABLET | Refills: 0 | Status: CANCELLED | OUTPATIENT
Start: 2019-06-07

## 2019-06-07 RX ORDER — METOPROLOL TARTRATE 25 MG/1
25 TABLET, FILM COATED ORAL DAILY
Qty: 90 TABLET | Refills: 2 | Status: SHIPPED | OUTPATIENT
Start: 2019-06-07 | End: 2020-03-13 | Stop reason: SDUPTHER

## 2019-06-07 RX ORDER — TRAZODONE HYDROCHLORIDE 50 MG/1
50 TABLET ORAL NIGHTLY
Qty: 30 TABLET | Refills: 11 | Status: SHIPPED | OUTPATIENT
Start: 2019-06-07 | End: 2019-07-12 | Stop reason: SDUPTHER

## 2019-06-07 RX ORDER — DICYCLOMINE HYDROCHLORIDE 10 MG/1
CAPSULE ORAL 3 TIMES DAILY
Refills: 0 | COMMUNITY
Start: 2019-04-01 | End: 2019-06-07 | Stop reason: SDUPTHER

## 2019-06-07 RX ORDER — HYOSCYAMINE SULFATE 0.38 MG/1
TABLET, EXTENDED RELEASE ORAL
Refills: 0 | COMMUNITY
Start: 2019-05-22 | End: 2019-06-07

## 2019-06-07 NOTE — PROGRESS NOTES
Subjective:       Patient ID: Alyce Zurita is a 58 y.o. female.    Chief Complaint: Hypertension (3 mo follow up)    HPI    Htn - pt is adherent with her meds, but her bP is running low 100/70s or 60s at home. Somewhat lightheaded int.     Generalized abdominal pain - followed by [facility] - Dr Ramirez - > had Ct scan - diverticulitis and was subsequently prescribed cipro and flagyl . No n/v/c/d    Sleep disturbance - pt states that amitriptyline hasn't helped, but she has tried her 's trazodone which layne worked for her. She is asking for this.       Current Outpatient Medications on File Prior to Visit   Medication Sig Dispense Refill    amLODIPine (NORVASC) 10 MG tablet Take 1 tablet (10 mg total) by mouth once daily. 90 tablet 3    cholecalciferol, vitamin D3, (VITAMIN D3 ORAL) Take by mouth 2 (two) times daily.      diclofenac sodium (VOLTAREN) 1 % Gel Apply 2 g topically 4 (four) times daily. 100 g 4    FLUoxetine 20 MG capsule Take 1 capsule (20 mg total) by mouth every morning. 90 capsule 1    fluticasone (FLONASE) 50 mcg/actuation nasal spray 2 sprays by Each Nare route once daily.      gabapentin (NEURONTIN) 300 MG capsule Take 2 tablet in the morning and 2 tablet nightly 360 capsule 3    hydroCHLOROthiazide (MICROZIDE) 12.5 mg capsule Take 12.5 mg by mouth once daily.       omeprazole (PRILOSEC) 40 MG capsule Take 1 capsule (40 mg total) by mouth 2 (two) times daily before meals. 180 capsule 1    potassium chloride SA (K-DUR,KLOR-CON) 20 MEQ tablet Take 1 tablet (20 mEq total) by mouth 2 (two) times daily. 90 tablet 0    ranitidine (ZANTAC) 300 MG tablet 2 (two) times daily.  3    [DISCONTINUED] dicyclomine (BENTYL) 10 MG capsule 3 (three) times daily.  0    [DISCONTINUED] hyoscyamine (LEVBID) 0.375 mg Tb12 TAKE 1 TABLET BY MOUTH TWICE DAILY AS NEEDED FOR PAIN DISSOLVE  TABLET  UNDER  TONGUE  0    [DISCONTINUED] metoprolol tartrate (LOPRESSOR) 25 MG tablet Take 1 tablet (25 mg total) by  mouth once daily. 90 tablet 1    amitriptyline (ELAVIL) 150 MG Tab Take 1 tablet (150 mg total) by mouth nightly as needed. 90 tablet 3    aspirin (ECOTRIN) 81 MG EC tablet Take 1 tablet (81 mg total) by mouth once daily. 90 tablet 3    azelastine (ASTELIN) 137 mcg (0.1 %) nasal spray 1 spray (137 mcg total) by Nasal route 2 (two) times daily. 30 mL 2    montelukast (SINGULAIR) 10 mg tablet TAKE 1 TABLET BY MOUTH ONCE DAILY IN THE EVENING 90 tablet 1    [DISCONTINUED] meloxicam (MOBIC) 15 MG tablet Take 0.5 tablets (7.5 mg total) by mouth daily as needed for Pain. 30 tablet 1     No current facility-administered medications on file prior to visit.        Past Medical History:   Diagnosis Date    Allergy     Anticoagulant long-term use     Anxiety 11/16/2018    Arthritis     Breast injury     left 10 yrs ago/ hit in chest    Carpal tunnel syndrome of left wrist 9/10/2018    GERD (gastroesophageal reflux disease)     Hyperlipemia     Hypertension, uncontrolled 8/17/2018    Interstitial cystitis     Kidney problem     Meningitis     3 months old    PONV (postoperative nausea and vomiting)     pt needs meds for PONV    Tachycardia        Family History   Problem Relation Age of Onset    Arthritis Mother     Glaucoma Mother     Breast cancer Mother     Asthma Daughter     Breast cancer Maternal Aunt     Breast cancer Maternal Grandmother     Breast cancer Paternal Grandmother     Breast cancer Maternal Aunt         reports that she has never smoked. She has never used smokeless tobacco. She reports that she does not drink alcohol or use drugs.    Review of Systems   Constitutional: Negative for chills and fever.   Gastrointestinal: Positive for abdominal pain. Negative for diarrhea, nausea and vomiting.       Objective:     Vitals:    06/07/19 0843   BP: 100/70   Pulse: 62   Resp: 16   Temp: 98.5 °F (36.9 °C)        Physical Exam   Constitutional: She appears well-developed. No distress.    HENT:   Head: Normocephalic and atraumatic.   Eyes: Conjunctivae are normal. No scleral icterus.   Pulmonary/Chest: Effort normal.   Abdominal: She exhibits no distension and no mass. There is tenderness (mild LLQ ttp). There is no rebound and no guarding.   Neurological: She is alert.   Skin: She is not diaphoretic.   Psychiatric: She has a normal mood and affect. Her behavior is normal.   Vitals reviewed.      Assessment:       1. Finger pain, right    2. Primary insomnia    3. Diverticulitis    4. Hypotension, unspecified hypotension type    5. Functional abdominal pain syndrome        Plan:       Alyce was seen today for hypertension.    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   Primary insomnia  -     traZODone (DESYREL) 50 MG tablet; Take 1 tablet (50 mg total) by mouth every evening.  Chronic - stable - d/c amitr - start trazo  Diverticulitis  -     Ambulatory referral to Gastroenterology  Resolving after abx administration - pt asked to call [facility] and ask to see another provider per her preference.    Hypotension, unspecified hypotension type  -     metoprolol tartrate (LOPRESSOR) 25 MG tablet; Take 1 tablet (25 mg total) by mouth once daily.    Functional abdominal pain syndrome  -     dicyclomine (BENTYL) 10 MG capsule; Take 1 capsule (10 mg total) by mouth 3 (three) times daily as needed.  - Chronic - stable     Pt is doing well on current therapy. No side effects noted. Will continue current therapy.            Follow up in about 1 month (around 7/7/2019) for hypotension, sleep disturbance, diverticulitis.        Pt verbalized understanding and agreed with our plan.

## 2019-06-13 ENCOUNTER — TELEPHONE (OUTPATIENT)
Dept: ADMINISTRATIVE | Facility: HOSPITAL | Age: 59
End: 2019-06-13

## 2019-06-16 ENCOUNTER — PATIENT MESSAGE (OUTPATIENT)
Dept: FAMILY MEDICINE | Facility: CLINIC | Age: 59
End: 2019-06-16

## 2019-06-17 RX ORDER — HYDROCHLOROTHIAZIDE 12.5 MG/1
12.5 CAPSULE ORAL DAILY
Qty: 90 CAPSULE | Refills: 1 | Status: SHIPPED | OUTPATIENT
Start: 2019-06-17 | End: 2019-12-23 | Stop reason: SDUPTHER

## 2019-06-17 RX ORDER — AMLODIPINE BESYLATE 5 MG/1
5 TABLET ORAL DAILY
Qty: 90 TABLET | Refills: 1 | Status: SHIPPED | OUTPATIENT
Start: 2019-06-17 | End: 2019-10-11

## 2019-07-12 ENCOUNTER — OFFICE VISIT (OUTPATIENT)
Dept: FAMILY MEDICINE | Facility: CLINIC | Age: 59
End: 2019-07-12
Payer: COMMERCIAL

## 2019-07-12 VITALS
WEIGHT: 191.13 LBS | DIASTOLIC BLOOD PRESSURE: 72 MMHG | TEMPERATURE: 99 F | HEIGHT: 65 IN | SYSTOLIC BLOOD PRESSURE: 116 MMHG | OXYGEN SATURATION: 95 % | HEART RATE: 52 BPM | BODY MASS INDEX: 31.85 KG/M2 | RESPIRATION RATE: 20 BRPM

## 2019-07-12 DIAGNOSIS — F51.01 PRIMARY INSOMNIA: ICD-10-CM

## 2019-07-12 DIAGNOSIS — F41.9 ANXIETY: ICD-10-CM

## 2019-07-12 DIAGNOSIS — G89.29 CHRONIC NECK PAIN: ICD-10-CM

## 2019-07-12 DIAGNOSIS — K21.9 GASTROESOPHAGEAL REFLUX DISEASE WITHOUT ESOPHAGITIS: Primary | ICD-10-CM

## 2019-07-12 DIAGNOSIS — M54.2 CHRONIC NECK PAIN: ICD-10-CM

## 2019-07-12 PROCEDURE — 3008F PR BODY MASS INDEX (BMI) DOCUMENTED: ICD-10-PCS | Mod: CPTII,S$GLB,, | Performed by: FAMILY MEDICINE

## 2019-07-12 PROCEDURE — 99999 PR PBB SHADOW E&M-EST. PATIENT-LVL IV: ICD-10-PCS | Mod: PBBFAC,,, | Performed by: FAMILY MEDICINE

## 2019-07-12 PROCEDURE — 99214 OFFICE O/P EST MOD 30 MIN: CPT | Mod: S$GLB,,, | Performed by: FAMILY MEDICINE

## 2019-07-12 PROCEDURE — 3074F PR MOST RECENT SYSTOLIC BLOOD PRESSURE < 130 MM HG: ICD-10-PCS | Mod: CPTII,S$GLB,, | Performed by: FAMILY MEDICINE

## 2019-07-12 PROCEDURE — 99214 PR OFFICE/OUTPT VISIT, EST, LEVL IV, 30-39 MIN: ICD-10-PCS | Mod: S$GLB,,, | Performed by: FAMILY MEDICINE

## 2019-07-12 PROCEDURE — 3008F BODY MASS INDEX DOCD: CPT | Mod: CPTII,S$GLB,, | Performed by: FAMILY MEDICINE

## 2019-07-12 PROCEDURE — 3078F DIAST BP <80 MM HG: CPT | Mod: CPTII,S$GLB,, | Performed by: FAMILY MEDICINE

## 2019-07-12 PROCEDURE — 3078F PR MOST RECENT DIASTOLIC BLOOD PRESSURE < 80 MM HG: ICD-10-PCS | Mod: CPTII,S$GLB,, | Performed by: FAMILY MEDICINE

## 2019-07-12 PROCEDURE — 3074F SYST BP LT 130 MM HG: CPT | Mod: CPTII,S$GLB,, | Performed by: FAMILY MEDICINE

## 2019-07-12 PROCEDURE — 99999 PR PBB SHADOW E&M-EST. PATIENT-LVL IV: CPT | Mod: PBBFAC,,, | Performed by: FAMILY MEDICINE

## 2019-07-12 RX ORDER — FLUOXETINE HYDROCHLORIDE 20 MG/1
20 CAPSULE ORAL EVERY MORNING
Qty: 90 CAPSULE | Refills: 1 | Status: SHIPPED | OUTPATIENT
Start: 2019-07-12 | End: 2019-11-11 | Stop reason: SDUPTHER

## 2019-07-12 RX ORDER — GABAPENTIN 300 MG/1
CAPSULE ORAL
Qty: 360 CAPSULE | Refills: 3 | Status: SHIPPED | OUTPATIENT
Start: 2019-07-12 | End: 2020-07-27 | Stop reason: SDUPTHER

## 2019-07-12 RX ORDER — TRAZODONE HYDROCHLORIDE 150 MG/1
75 TABLET ORAL NIGHTLY
Qty: 90 TABLET | Refills: 1 | Status: SHIPPED | OUTPATIENT
Start: 2019-07-12 | End: 2020-03-29 | Stop reason: SDUPTHER

## 2019-07-12 NOTE — PROGRESS NOTES
Subjective:       Patient ID: Alyce Zurita is a 58 y.o. female.    Chief Complaint: Diverticulitis (1 mo follow up ); sleep disturbance (1 mo follow up ); Hypotension (1 mo follow up ); and Consult (regarding getting injection shoulder pain )    HPI    Hypo/hypertension - chronic - new recurrent issues with low blood pressure. Pt is adherent with her medications.     Hypokalemia - chronic - pt is adherent with her potassium without side effects. Requesting refill.     GERD - chronic - stable - pt is adherent with their medications, and is requesting a refill. No side effects noted. No complaints today. GI ok'd omep. and ranitidine use together.           Current Outpatient Medications on File Prior to Visit   Medication Sig Dispense Refill    amLODIPine (NORVASC) 5 MG tablet Take 1 tablet (5 mg total) by mouth once daily. 90 tablet 1    aspirin (ECOTRIN) 81 MG EC tablet Take 1 tablet (81 mg total) by mouth once daily. 90 tablet 3    cholecalciferol, vitamin D3, (VITAMIN D3 ORAL) Take by mouth 2 (two) times daily.      diclofenac sodium (VOLTAREN) 1 % Gel Apply 2 g topically 4 (four) times daily. 100 g 4    dicyclomine (BENTYL) 10 MG capsule Take 1 capsule (10 mg total) by mouth 3 (three) times daily as needed. 90 capsule 1    fluticasone (FLONASE) 50 mcg/actuation nasal spray 2 sprays by Each Nare route once daily.      hydroCHLOROthiazide (MICROZIDE) 12.5 mg capsule Take 1 capsule (12.5 mg total) by mouth once daily. 90 capsule 1    metoprolol tartrate (LOPRESSOR) 25 MG tablet Take 1 tablet (25 mg total) by mouth once daily. 90 tablet 2    montelukast (SINGULAIR) 10 mg tablet TAKE 1 TABLET BY MOUTH ONCE DAILY IN THE EVENING 90 tablet 1    omeprazole (PRILOSEC) 40 MG capsule Take 1 capsule (40 mg total) by mouth 2 (two) times daily before meals. 180 capsule 1    potassium chloride SA (K-DUR,KLOR-CON) 20 MEQ tablet Take 1 tablet (20 mEq total) by mouth 2 (two) times daily. 90 tablet 0    [DISCONTINUED]  FLUoxetine 20 MG capsule Take 1 capsule (20 mg total) by mouth every morning. 90 capsule 1    [DISCONTINUED] gabapentin (NEURONTIN) 300 MG capsule Take 2 tablet in the morning and 2 tablet nightly 360 capsule 3    [DISCONTINUED] traZODone (DESYREL) 50 MG tablet Take 1 tablet (50 mg total) by mouth every evening. 30 tablet 11    azelastine (ASTELIN) 137 mcg (0.1 %) nasal spray 1 spray (137 mcg total) by Nasal route 2 (two) times daily. 30 mL 2    [DISCONTINUED] amitriptyline (ELAVIL) 150 MG Tab Take 1 tablet (150 mg total) by mouth nightly as needed. 90 tablet 3    [DISCONTINUED] ranitidine (ZANTAC) 300 MG tablet 2 (two) times daily.  3     No current facility-administered medications on file prior to visit.        Past Medical History:   Diagnosis Date    Allergy     Anticoagulant long-term use     Anxiety 11/16/2018    Arthritis     Breast injury     left 10 yrs ago/ hit in chest    Carpal tunnel syndrome of left wrist 9/10/2018    GERD (gastroesophageal reflux disease)     Hyperlipemia     Hypertension, uncontrolled 8/17/2018    Interstitial cystitis     Kidney problem     Meningitis     3 months old    PONV (postoperative nausea and vomiting)     pt needs meds for PONV    Tachycardia        Family History   Problem Relation Age of Onset    Arthritis Mother     Glaucoma Mother     Breast cancer Mother     Asthma Daughter     Breast cancer Maternal Aunt     Breast cancer Maternal Grandmother     Breast cancer Paternal Grandmother     Breast cancer Maternal Aunt         reports that she has never smoked. She has never used smokeless tobacco. She reports that she does not drink alcohol or use drugs.    Review of Systems   Constitutional: Negative for chills and fever.   Gastrointestinal: Negative for diarrhea, nausea and vomiting.       Objective:     Vitals:    07/12/19 1048   BP: 116/72   Pulse: (!) 52   Resp: 20   Temp: 98.8 °F (37.1 °C)        Physical Exam   Constitutional: She appears  well-developed. No distress.   HENT:   Head: Normocephalic and atraumatic.   Eyes: Conjunctivae are normal. No scleral icterus.   Pulmonary/Chest: Effort normal.   Neurological: She is alert.   Skin: She is not diaphoretic.   Psychiatric: She has a normal mood and affect. Her behavior is normal.   Vitals reviewed.      Assessment:       1. Gastroesophageal reflux disease without esophagitis    2. Chronic neck pain    3. Primary insomnia    4. Anxiety        Plan:       Alyce was seen today for diverticulitis, sleep disturbance, hypotension and consult.    Diagnoses and all orders for this visit:    Gastroesophageal reflux disease without esophagitis  -     ranitidine (ZANTAC) 300 MG tablet; Take 1 tablet (300 mg total) by mouth 2 (two) times daily.  - Chronic - stable - pt on H2B and PPI per GI    Pt is doing well on current therapy. No side effects noted. Will continue current therapy.    Chronic neck pain  -     gabapentin (NEURONTIN) 300 MG capsule; Take 2 tablet in the morning and 2 tablet nightly  - Chronic - stable     Pt is doing well on current therapy. No side effects noted. Will continue current therapy.    Primary insomnia  -     traZODone (DESYREL) 150 MG tablet; Take 0.5 tablets (75 mg total) by mouth every evening.  Chronic - improving with trazodone. Pt would like to try 75mg strength. Pt will inform me if ineffective.     Anxiety  -     FLUoxetine 20 MG capsule; Take 1 capsule (20 mg total) by mouth every morning.  - Chronic - stable     Pt is doing well on current therapy. No side effects noted. Will continue current therapy.          Follow up in about 3 months (around 10/12/2019) for Hypertension, Anxiety, insomnia.        Pt verbalized understanding and agreed with our plan.

## 2019-07-22 RX ORDER — POTASSIUM CHLORIDE 20 MEQ/1
20 TABLET, EXTENDED RELEASE ORAL 2 TIMES DAILY
Qty: 90 TABLET | Refills: 1 | Status: SHIPPED | OUTPATIENT
Start: 2019-07-22 | End: 2020-05-29

## 2019-07-31 ENCOUNTER — OFFICE VISIT (OUTPATIENT)
Dept: FAMILY MEDICINE | Facility: CLINIC | Age: 59
End: 2019-07-31
Payer: COMMERCIAL

## 2019-07-31 VITALS
HEART RATE: 57 BPM | HEIGHT: 65 IN | WEIGHT: 188.25 LBS | TEMPERATURE: 99 F | SYSTOLIC BLOOD PRESSURE: 106 MMHG | DIASTOLIC BLOOD PRESSURE: 68 MMHG | BODY MASS INDEX: 31.36 KG/M2 | OXYGEN SATURATION: 95 % | RESPIRATION RATE: 20 BRPM

## 2019-07-31 DIAGNOSIS — M54.2 CHRONIC NECK PAIN: ICD-10-CM

## 2019-07-31 DIAGNOSIS — J30.89 NON-SEASONAL ALLERGIC RHINITIS DUE TO OTHER ALLERGIC TRIGGER: ICD-10-CM

## 2019-07-31 DIAGNOSIS — G89.29 CHRONIC NECK PAIN: ICD-10-CM

## 2019-07-31 DIAGNOSIS — J01.10 ACUTE NON-RECURRENT FRONTAL SINUSITIS: Primary | ICD-10-CM

## 2019-07-31 PROCEDURE — 99999 PR PBB SHADOW E&M-EST. PATIENT-LVL V: ICD-10-PCS | Mod: PBBFAC,,, | Performed by: FAMILY MEDICINE

## 2019-07-31 PROCEDURE — 3078F PR MOST RECENT DIASTOLIC BLOOD PRESSURE < 80 MM HG: ICD-10-PCS | Mod: CPTII,S$GLB,, | Performed by: FAMILY MEDICINE

## 2019-07-31 PROCEDURE — 20550 PR INJECT TENDON SHEATH/LIGAMENT: ICD-10-PCS | Mod: RT,S$GLB,, | Performed by: FAMILY MEDICINE

## 2019-07-31 PROCEDURE — 3074F PR MOST RECENT SYSTOLIC BLOOD PRESSURE < 130 MM HG: ICD-10-PCS | Mod: CPTII,S$GLB,, | Performed by: FAMILY MEDICINE

## 2019-07-31 PROCEDURE — 3074F SYST BP LT 130 MM HG: CPT | Mod: CPTII,S$GLB,, | Performed by: FAMILY MEDICINE

## 2019-07-31 PROCEDURE — 3008F PR BODY MASS INDEX (BMI) DOCUMENTED: ICD-10-PCS | Mod: CPTII,S$GLB,, | Performed by: FAMILY MEDICINE

## 2019-07-31 PROCEDURE — 20550 NJX 1 TENDON SHEATH/LIGAMENT: CPT | Mod: RT,S$GLB,, | Performed by: FAMILY MEDICINE

## 2019-07-31 PROCEDURE — 3078F DIAST BP <80 MM HG: CPT | Mod: CPTII,S$GLB,, | Performed by: FAMILY MEDICINE

## 2019-07-31 PROCEDURE — 99214 OFFICE O/P EST MOD 30 MIN: CPT | Mod: 25,S$GLB,, | Performed by: FAMILY MEDICINE

## 2019-07-31 PROCEDURE — 3008F BODY MASS INDEX DOCD: CPT | Mod: CPTII,S$GLB,, | Performed by: FAMILY MEDICINE

## 2019-07-31 PROCEDURE — 99999 PR PBB SHADOW E&M-EST. PATIENT-LVL V: CPT | Mod: PBBFAC,,, | Performed by: FAMILY MEDICINE

## 2019-07-31 PROCEDURE — 99214 PR OFFICE/OUTPT VISIT, EST, LEVL IV, 30-39 MIN: ICD-10-PCS | Mod: 25,S$GLB,, | Performed by: FAMILY MEDICINE

## 2019-07-31 RX ORDER — MONTELUKAST SODIUM 10 MG/1
10 TABLET ORAL NIGHTLY
Qty: 90 TABLET | Refills: 2 | Status: SHIPPED | OUTPATIENT
Start: 2019-07-31 | End: 2020-03-24 | Stop reason: SDUPTHER

## 2019-07-31 RX ORDER — AMOXICILLIN AND CLAVULANATE POTASSIUM 875; 125 MG/1; MG/1
1 TABLET, FILM COATED ORAL 2 TIMES DAILY
Qty: 20 TABLET | Refills: 0 | Status: SHIPPED | OUTPATIENT
Start: 2019-07-31 | End: 2019-08-10

## 2019-07-31 NOTE — PROCEDURES
Procedures   Patient informed and written consent obtained after discussing R/B/A, all patients questions answered, patient voiced understanding.  Indications: trigger finger x 2 in 2nd fingers bilat    Pertinent lab values: NA    Type of anesthesia: Gebauer's anesthetic cold spray  Procedure:  Patient prepped in sterile fashion, trigger points identified at base of L AND R thumb, 20 mg (0.5cc) of kenalog and 0.5 cc of lidocaine 1% without epi injected into trigger finger location.      Complications: none  Estimated blood loss: none  Patient tolerated procedure well.  Given wound care instructions and instructions on activity and when to return to full activity.    NDC from [id number]    CPT: 98348  CPT for [facility] 52126     for kenalog

## 2019-07-31 NOTE — PROGRESS NOTES
Subjective:       Patient ID: Alyce Zurita is a 59 y.o. female.    Chief Complaint: Finger pain, right (injection ) and Sinus Problem (cough over 1 week )    HPI    Finger pain - bilateral 2nd finger pain near the base that is a/w finger locking in flexion x 4 weeks or so and has been worsening since the onset. No relief with activity modification.     Sinus problems - onset 1 week of cough, congestion, ear pressure, and PND that has been worsening since the onset. Flonase and singulair.       Current Outpatient Medications on File Prior to Visit   Medication Sig Dispense Refill    amLODIPine (NORVASC) 5 MG tablet Take 1 tablet (5 mg total) by mouth once daily. 90 tablet 1    aspirin (ECOTRIN) 81 MG EC tablet Take 1 tablet (81 mg total) by mouth once daily. 90 tablet 3    cholecalciferol, vitamin D3, (VITAMIN D3 ORAL) Take by mouth 2 (two) times daily.      diclofenac sodium (VOLTAREN) 1 % Gel Apply 2 g topically 4 (four) times daily. 100 g 4    dicyclomine (BENTYL) 10 MG capsule Take 1 capsule (10 mg total) by mouth 3 (three) times daily as needed. 90 capsule 1    FLUoxetine 20 MG capsule Take 1 capsule (20 mg total) by mouth every morning. 90 capsule 1    fluticasone (FLONASE) 50 mcg/actuation nasal spray 2 sprays by Each Nare route once daily.      gabapentin (NEURONTIN) 300 MG capsule Take 2 tablet in the morning and 2 tablet nightly 360 capsule 3    glucosamine/chondr gray A sod (OSTEO BI-FLEX ORAL) Take by mouth 2 (two) times daily.      hydroCHLOROthiazide (MICROZIDE) 12.5 mg capsule Take 1 capsule (12.5 mg total) by mouth once daily. 90 capsule 1    metoprolol tartrate (LOPRESSOR) 25 MG tablet Take 1 tablet (25 mg total) by mouth once daily. 90 tablet 2    omeprazole (PRILOSEC) 40 MG capsule Take 1 capsule (40 mg total) by mouth 2 (two) times daily before meals. 180 capsule 1    potassium chloride SA (K-DUR,KLOR-CON) 20 MEQ tablet Take 1 tablet (20 mEq total) by mouth 2 (two) times daily. 90  tablet 1    ranitidine (ZANTAC) 300 MG tablet Take 1 tablet (300 mg total) by mouth 2 (two) times daily. 180 tablet 3    traZODone (DESYREL) 150 MG tablet Take 0.5 tablets (75 mg total) by mouth every evening. 90 tablet 1    [DISCONTINUED] montelukast (SINGULAIR) 10 mg tablet TAKE 1 TABLET BY MOUTH ONCE DAILY IN THE EVENING 90 tablet 1    [DISCONTINUED] azelastine (ASTELIN) 137 mcg (0.1 %) nasal spray 1 spray (137 mcg total) by Nasal route 2 (two) times daily. 30 mL 2     No current facility-administered medications on file prior to visit.        Past Medical History:   Diagnosis Date    Allergy     Anticoagulant long-term use     Anxiety 11/16/2018    Arthritis     Breast injury     left 10 yrs ago/ hit in chest    Carpal tunnel syndrome of left wrist 9/10/2018    GERD (gastroesophageal reflux disease)     Hyperlipemia     Hypertension, uncontrolled 8/17/2018    Interstitial cystitis     Kidney problem     Meningitis     3 months old    PONV (postoperative nausea and vomiting)     pt needs meds for PONV    Tachycardia        Family History   Problem Relation Age of Onset    Arthritis Mother     Glaucoma Mother     Breast cancer Mother     Asthma Daughter     Breast cancer Maternal Aunt     Breast cancer Maternal Grandmother     Breast cancer Paternal Grandmother     Breast cancer Maternal Aunt         reports that she has never smoked. She has never used smokeless tobacco. She reports that she does not drink alcohol or use drugs.    Review of Systems   Constitutional: Negative for chills and fever.   Gastrointestinal: Negative for diarrhea, nausea and vomiting.       Objective:     Vitals:    07/31/19 1059   BP: 106/68   Pulse: (!) 57   Resp: 20   Temp: 98.6 °F (37 °C)        Physical Exam   Constitutional: She appears well-developed. No distress.   HENT:   Head: Normocephalic and atraumatic.   Right Ear: No tenderness. Tympanic membrane is not injected. A middle ear effusion is present.    Left Ear: No tenderness. Tympanic membrane is not injected. A middle ear effusion is present.   Nose: Mucosal edema present. Right sinus exhibits frontal sinus tenderness. Right sinus exhibits no maxillary sinus tenderness. Left sinus exhibits frontal sinus tenderness. Left sinus exhibits no maxillary sinus tenderness.   Eyes: Conjunctivae are normal. Right eye exhibits no discharge. Left eye exhibits no discharge. No scleral icterus.   Cardiovascular: Normal rate, regular rhythm and normal heart sounds. Exam reveals no gallop and no friction rub.   No murmur heard.  Pulmonary/Chest: Effort normal and breath sounds normal. No respiratory distress. She has no wheezes. She has no rales.   Neurological: She is alert.   Skin: She is not diaphoretic.   Psychiatric: She has a normal mood and affect.   Vitals reviewed.      Assessment:       1. Acute non-recurrent frontal sinusitis    2. Non-seasonal allergic rhinitis due to other allergic trigger    3. Chronic neck pain    4. Trigger finger of both hands        Plan:       Alyce was seen today for finger pain, right and sinus problem.    Diagnoses and all orders for this visit:    Acute non-recurrent frontal sinusitis  -     amoxicillin-clavulanate 875-125mg (AUGMENTIN) 875-125 mg per tablet; Take 1 tablet by mouth 2 (two) times daily. for 10 days  Pts sxs and pex suggest acute sinusitis. Duration of illness is greater than 10 days. Will treat as above. Pt to call back or notify me if sxs worsen. Rec'd pt to eat yogurt daily If appropriate.    Non-seasonal allergic rhinitis due to other allergic trigger  -     montelukast (SINGULAIR) 10 mg tablet; Take 1 tablet (10 mg total) by mouth every evening.  - Chronic - stable     Pt is doing well on current therapy. No side effects noted. Will continue current therapy.    Chronic neck pain  -     Ambulatory referral to Pain Clinic  Pt requests referral today    Trigger finger of both hands  New - p r/b discussion pt opted for  injection. No submerging in water for 24 hours. Call for redness, swelling or worsening pain.           Follow up in about 3 months (around 10/31/2019) for establish care new pcp.        Pt verbalized understanding and agreed with our plan.

## 2019-07-31 NOTE — PROGRESS NOTES
Health Maintenance Due   Topic     High Dose Statin  Consult with Dr Davila      Zoster: No hx chickenpox

## 2019-08-10 ENCOUNTER — PATIENT MESSAGE (OUTPATIENT)
Dept: FAMILY MEDICINE | Facility: CLINIC | Age: 59
End: 2019-08-10

## 2019-08-10 DIAGNOSIS — B37.31 CANDIDA VAGINITIS: Primary | ICD-10-CM

## 2019-08-11 RX ORDER — FLUCONAZOLE 150 MG/1
150 TABLET ORAL DAILY
Qty: 1 TABLET | Refills: 0 | Status: SHIPPED | OUTPATIENT
Start: 2019-08-11 | End: 2019-08-12

## 2019-08-11 NOTE — TELEPHONE ENCOUNTER
Omkar I am Dr. Moreno covering for Dr. Davila. I have sent diflucan to your pharmacy for your yeast infection. Take care.

## 2019-08-17 ENCOUNTER — OFFICE VISIT (OUTPATIENT)
Dept: URGENT CARE | Facility: CLINIC | Age: 59
End: 2019-08-17
Payer: COMMERCIAL

## 2019-08-17 VITALS
TEMPERATURE: 97 F | OXYGEN SATURATION: 96 % | BODY MASS INDEX: 31.32 KG/M2 | WEIGHT: 188 LBS | RESPIRATION RATE: 18 BRPM | HEIGHT: 65 IN | HEART RATE: 55 BPM

## 2019-08-17 DIAGNOSIS — R10.9 FLANK PAIN: ICD-10-CM

## 2019-08-17 DIAGNOSIS — M54.50 ACUTE BILATERAL LOW BACK PAIN WITHOUT SCIATICA: Primary | ICD-10-CM

## 2019-08-17 LAB
BILIRUB UR QL STRIP: NEGATIVE
GLUCOSE UR QL STRIP: NEGATIVE
KETONES UR QL STRIP: NEGATIVE
LEUKOCYTE ESTERASE UR QL STRIP: NEGATIVE
PH, POC UA: 6.5 (ref 5–8)
POC BLOOD, URINE: NEGATIVE
POC NITRATES, URINE: NEGATIVE
PROT UR QL STRIP: NEGATIVE
SP GR UR STRIP: 1.01 (ref 1–1.03)
UROBILINOGEN UR STRIP-ACNC: NORMAL (ref 0.1–1.1)

## 2019-08-17 PROCEDURE — 99214 OFFICE O/P EST MOD 30 MIN: CPT | Mod: 25,S$GLB,, | Performed by: NURSE PRACTITIONER

## 2019-08-17 PROCEDURE — 81003 POCT URINALYSIS, DIPSTICK, AUTOMATED, W/O SCOPE: ICD-10-PCS | Mod: QW,S$GLB,, | Performed by: NURSE PRACTITIONER

## 2019-08-17 PROCEDURE — 99214 PR OFFICE/OUTPT VISIT, EST, LEVL IV, 30-39 MIN: ICD-10-PCS | Mod: 25,S$GLB,, | Performed by: NURSE PRACTITIONER

## 2019-08-17 PROCEDURE — 96372 PR INJECTION,THERAP/PROPH/DIAG2ST, IM OR SUBCUT: ICD-10-PCS | Mod: S$GLB,,, | Performed by: NURSE PRACTITIONER

## 2019-08-17 PROCEDURE — 81003 URINALYSIS AUTO W/O SCOPE: CPT | Mod: QW,S$GLB,, | Performed by: NURSE PRACTITIONER

## 2019-08-17 PROCEDURE — 72220 XR SACRUM AND COCCYX: ICD-10-PCS | Mod: S$GLB,,, | Performed by: RADIOLOGY

## 2019-08-17 PROCEDURE — 3008F BODY MASS INDEX DOCD: CPT | Mod: CPTII,S$GLB,, | Performed by: NURSE PRACTITIONER

## 2019-08-17 PROCEDURE — 3008F PR BODY MASS INDEX (BMI) DOCUMENTED: ICD-10-PCS | Mod: CPTII,S$GLB,, | Performed by: NURSE PRACTITIONER

## 2019-08-17 PROCEDURE — 96372 THER/PROPH/DIAG INJ SC/IM: CPT | Mod: S$GLB,,, | Performed by: NURSE PRACTITIONER

## 2019-08-17 PROCEDURE — 72220 X-RAY EXAM SACRUM TAILBONE: CPT | Mod: S$GLB,,, | Performed by: RADIOLOGY

## 2019-08-17 RX ORDER — METHYLPREDNISOLONE 4 MG/1
TABLET ORAL
Qty: 1 PACKAGE | Refills: 0 | Status: SHIPPED | OUTPATIENT
Start: 2019-08-17 | End: 2019-08-23

## 2019-08-17 RX ORDER — KETOROLAC TROMETHAMINE 30 MG/ML
30 INJECTION, SOLUTION INTRAMUSCULAR; INTRAVENOUS ONCE
Status: DISCONTINUED | OUTPATIENT
Start: 2019-08-17 | End: 2019-08-17

## 2019-08-17 RX ORDER — KETOROLAC TROMETHAMINE 30 MG/ML
30 INJECTION, SOLUTION INTRAMUSCULAR; INTRAVENOUS ONCE
Status: COMPLETED | OUTPATIENT
Start: 2019-08-17 | End: 2019-08-17

## 2019-08-17 RX ADMIN — KETOROLAC TROMETHAMINE 30 MG: 30 INJECTION, SOLUTION INTRAMUSCULAR; INTRAVENOUS at 11:08

## 2019-08-17 NOTE — PROGRESS NOTES
"Subjective:       Patient ID: Alyce Zurita is a 59 y.o. female.    Vitals:  height is 5' 5" (1.651 m) and weight is 85.3 kg (188 lb). Her temperature is 97.3 °F (36.3 °C). Her pulse is 55 (abnormal). Her respiration is 18 and oxygen saturation is 96%.     Chief Complaint: Back Pain    Pt c/o lower back pain, states she thinks the polyps in her colon are causing the radiating pain to her back. She was suppose to have a colonoscopy last Monday but got sick with a stomach bug. She saw an urgent care closer to her house Wednesday and they gave her a steroid shot and flexeril that didn't help at all. She does have some urinary symptoms but she says due to her bladder issues back then. Back hurts with movement.     Back Pain   This is a new problem. The current episode started in the past 7 days. The problem is unchanged. Pertinent negatives include no chest pain, dysuria, fever, headaches or weakness.       Constitution: Negative for chills, fatigue and fever.   HENT: Negative for congestion and sore throat.    Neck: Negative for painful lymph nodes.   Cardiovascular: Negative for chest pain and leg swelling.   Eyes: Negative for double vision and blurred vision.   Respiratory: Negative for cough and shortness of breath.    Gastrointestinal: Negative for nausea, vomiting and diarrhea.   Genitourinary: Negative for dysuria, frequency, urgency and history of kidney stones.   Musculoskeletal: Positive for back pain. Negative for joint pain, joint swelling, muscle cramps and muscle ache.   Skin: Negative for color change, pale, rash and bruising.   Allergic/Immunologic: Negative for seasonal allergies.   Neurological: Negative for dizziness, history of vertigo, light-headedness, passing out and headaches.   Hematologic/Lymphatic: Negative for swollen lymph nodes.   Psychiatric/Behavioral: Negative for nervous/anxious, sleep disturbance and depression. The patient is not nervous/anxious.        Results for orders placed or " performed in visit on 08/17/19   POCT Urinalysis, Dipstick, Automated, W/O Scope   Result Value Ref Range    POC Blood, Urine Negative Negative    POC Bilirubin, Urine Negative Negative    POC Urobilinogen, Urine norm 0.1 - 1.1    POC Ketones, Urine Negative Negative    POC Protein, Urine Negative Negative    POC Nitrates, Urine Negative Negative    POC Glucose, Urine Negative Negative    pH, UA 6.5 5 - 8    POC Specific Gravity, Urine 1.010 1.003 - 1.029    POC Leukocytes, Urine Negative Negative   X-ray Sacrum And Coccyx    Result Date: 8/17/2019  EXAMINATION: XR SACRUM AND COCCYX CLINICAL HISTORY: Low back pain TECHNIQUE: Two or three views of the sacrum and coccyx were performed. COMPARISON: None FINDINGS: The arcuate lines of the sacrum are intact.  No displaced fracture suggested on the lateral film.     1. As above Electronically signed by: Eric Jiménez DO Date:    08/17/2019 Time:    11:49      Objective:      Physical Exam   Constitutional: She is oriented to person, place, and time. She appears well-developed and well-nourished.   HENT:   Head: Normocephalic and atraumatic.   Right Ear: External ear normal.   Left Ear: External ear normal.   Nose: Nose normal. No nasal deformity. No epistaxis.   Mouth/Throat: Oropharynx is clear and moist and mucous membranes are normal.   Eyes: Conjunctivae and lids are normal.   Neck: Trachea normal, normal range of motion and phonation normal. Neck supple.   Cardiovascular: Normal rate, regular rhythm, normal heart sounds and normal pulses.   Pulmonary/Chest: Effort normal and breath sounds normal.   Abdominal: Soft. Normal appearance and bowel sounds are normal. She exhibits no distension and no mass. There is no tenderness. There is no CVA tenderness.   Musculoskeletal:        Back:    Neurological: She is alert and oriented to person, place, and time.   Skin: Skin is warm, dry and intact.   Psychiatric: She has a normal mood and affect. Her speech is normal and  behavior is normal. Cognition and memory are normal.   Nursing note and vitals reviewed.      Assessment:       1. Acute bilateral low back pain without sciatica    2. Flank pain        Plan:         Acute bilateral low back pain without sciatica  -     Discontinue: ketorolac injection 30 mg  -     X-Ray Sacrum And Coccyx; Future; Expected date: 08/17/2019  -     methylPREDNISolone (MEDROL DOSEPACK) 4 mg tablet; use as directed  Dispense: 1 Package; Refill: 0  -     Ambulatory referral to Orthopedics  -     ketorolac injection 30 mg    Flank pain  -     POCT Urinalysis, Dipstick, Automated, W/O Scope      Patient Instructions   PLEASE READ YOUR DISCHARGE INSTRUCTIONS ENTIRELY AS IT CONTAINS IMPORTANT INFORMATION.      Please drink plenty of fluids.  Please get plenty of rest.  Ice to the area.   You may do gently stretching if tolerable.    Please return here or go to the Emergency Department for any concerns or worsening of condition.    If you were prescribed a narcotic medication or muscle relaxer (none), do not drive or operate heavy equipment or machinery while taking these medications. Take a half first to see how it affects you    Take the ibuprofen with food. Also take an over the counter antacid with it (prilosec, Nexium, Pepcid) to protect your stomach.     If you were not prescribed an anti-inflammatory medication, and if you do not have any history of stomach/intestinal ulcers, or kidney disease, or are not taking a blood thinner such as Coumadin, Plavix, Pradaxa, Eloquis, or Xaralta for example, it is OK to take over the counter Ibuprofen or Advil or Motrin or Aleve as directed.  Do not take these medications on an empty stomach.    If you lose control of your bowel and/or bladder, please go to the nearest Emergency Department immediately.    If you lose sensation in between your legs by your genitalia and/or rectum, please go to the nearest Emergency Department immediately.    If you lose control or  sensation of any extremity, please go to the nearest Emergency Department immediately.    Do not stay in one position to long.  When sleeping on your back place a pillow under knees to reduce tension on back.  If sleeping on your side, place pillow between knees to keep spine in better alinement.  Wear supportive shoes such as tennis shoes for support of the lower back.  Take any medication as directed.    Please follow up with your primary care doctor or specialist as needed.    If you  smoke, please stop smoking.      Please arrange follow up with your primary medical clinic as soon as possible. You must understand that you've received an Urgent Care treatment only and that you may be released before all of your medical problems are known or treated. You, the patient, will arrange for follow up as instructed. If your symptoms worsen or fail to improve you should go to the Emergency Room.    General Neck and Back Pain    Both neck and back pain are usually caused by injury to the muscles or ligaments of the spine. Sometimes the disks that separate each bone of the spine may cause pain by pressing on a nearby nerve. Back and neck pain may appear after a sudden twisting or bending force (such as in a car accident), or sometimes after a simple awkward movement. In either case, muscle spasm is often present and adds to the pain.  Acute neck and back pain usually gets better in 1 to 2 weeks. Pain related to disk disease, arthritis in the spinal joints or spinal stenosis (narrowing of the spinal canal) can become chronic and last for months or years.  Back and neck pain are common problems. Most people feel better in 1 or 2 weeks, and most of the rest in 1 to 2 months. Most people can remain active.  People experience and describe pain differently.  · Pain can be sharp, stabbing, shooting, aching, cramping, or burning  · Movement, standing, bending, lifting, sitting, or walking may worsen the pain  · Pain can be  localized to one spot or area, or it can be more generalized  · Pain can spread or radiate upwards, downwards, to the front, or go down your arms  · Muscle spasm may occur.  Most of the time mechanical problems with the muscles or spine cause the pain. it is usually caused by an injury, whether known or not, to the muscles or ligaments. While illnesses can cause back pain, it is usually not caused by a serious illness. Pain is usually related to physical activity, whether sports, exercise, work, or normal activity. Sometimes it can occur without an identifiable cause. This can happen simply by stretching or moving wrong, without noting pain at the time. Other causes include:  · Overexertion, lifting, pushing, pulling incorrectly or too aggressively.  · Sudden twisting, bending or stretching from an accident (car or fall), or accidental movement.  · Poor posture  · Poor conditioning, lack of regular exercise  · Spinal disc disease or arthritis  · Stress  · Pregnancy, or illness like appendicitis, bladder or kidney infection, pelvic infections   Home care  · For neck pain: Use a comfortable pillow that supports the head and keeps the spine in a neutral position. The position of the head should not be tilted forward or backward.  · When in bed, try to find a position of comfort. A firm mattress is best. Try lying flat on your back with pillows under your knees. You can also try lying on your side with your knees bent up towards your chest and a pillow between your knees.  · At first, do not try to stretch out the sore spots. If there is a strain, it is not like the good soreness you get after exercising without an injury. In this case, stretching may make it worse.  · Avoid prolonged sitting, long car rides or travel. This puts more stress on the lower back than standing or walking.  · During the first 24 to 72 hours after an injury, apply an ice pack to the painful area for 20 minutes and then remove it for 20 minutes  over a period of 60 to 90 minutes or several times a day.   · You can alternate ice and heat therapies. Talk with your healthcare provider about the best treatment for your back or neck pain. As a safety precaution, do not use a heating pad at bedtime. Sleeping with a heating pad can lead to skin burns or tissue damage.  · Therapeutic massage can help relax the back and neck muscles without stretching them.  · Be aware of safe lifting methods and do not lift anything over 15 pounds until all the pain is gone.  Medications  Talk to your healthcare provider before using medicine, especially if you have other medical problems or are taking other medicines.  · You may use over-the-counter medicine to control pain, unless another pain medicine was prescribed. If you have chronic conditions like diabetes, liver or kidney disease, stomach ulcers,  gastrointestinal bleeding, or are taking blood thinner medicines.  · Be careful if you are given pain medicines, narcotics, or medicine for muscle spasm. They can cause drowsiness, and can affect your coordination, reflexes, and judgment. Do not drive or operate heavy machinery.  Follow-up care  Follow up with your healthcare provider, or as advised. Physical therapy or further tests may be needed.  If X-rays were taken, you will be notified of any new findings that may affect your care.  Call 911  Seek emergency medical care if any of the following occur:  · Trouble breathing  · Confusion  · Very drowsy or trouble awakening  · Fainting or loss of consciousness  · Rapid or very slow heart rate  · Loss of bowel or bladder control  When to seek medical advice  Call your healthcare provider right away if any of these occur:  · Pain becomes worse or spreads into your arms or legs  · Weakness, numbness or pain in one or both arms or legs  · Numbness in the groin area  · Difficulty walking  · Fever of 100.4ºF (38ºC) or higher, or as directed by your healthcare provider  Date Last  Reviewed: 7/1/2016  © 0252-8828 [employer]. [address] [place] [address], [place] [address]. All rights reserved. This information is not intended as a substitute for professional medical care. Always follow your healthcare professional's instructions.

## 2019-08-17 NOTE — PATIENT INSTRUCTIONS
PLEASE READ YOUR DISCHARGE INSTRUCTIONS ENTIRELY AS IT CONTAINS IMPORTANT INFORMATION.      Please drink plenty of fluids.  Please get plenty of rest.  Ice to the area.   You may do gently stretching if tolerable.    Please return here or go to the Emergency Department for any concerns or worsening of condition.    If you were prescribed a narcotic medication or muscle relaxer (none), do not drive or operate heavy equipment or machinery while taking these medications. Take a half first to see how it affects you    Take the ibuprofen with food. Also take an over the counter antacid with it (prilosec, Nexium, Pepcid) to protect your stomach.     If you were not prescribed an anti-inflammatory medication, and if you do not have any history of stomach/intestinal ulcers, or kidney disease, or are not taking a blood thinner such as Coumadin, Plavix, Pradaxa, Eloquis, or Xaralta for example, it is OK to take over the counter Ibuprofen or Advil or Motrin or Aleve as directed.  Do not take these medications on an empty stomach.    If you lose control of your bowel and/or bladder, please go to the nearest Emergency Department immediately.    If you lose sensation in between your legs by your genitalia and/or rectum, please go to the nearest Emergency Department immediately.    If you lose control or sensation of any extremity, please go to the nearest Emergency Department immediately.    Do not stay in one position to long.  When sleeping on your back place a pillow under knees to reduce tension on back.  If sleeping on your side, place pillow between knees to keep spine in better alinement.  Wear supportive shoes such as tennis shoes for support of the lower back.  Take any medication as directed.    Please follow up with your primary care doctor or specialist as needed.    If you  smoke, please stop smoking.      Please arrange follow up with your primary medical clinic as soon as possible. You must understand that you've  received an Urgent Care treatment only and that you may be released before all of your medical problems are known or treated. You, the patient, will arrange for follow up as instructed. If your symptoms worsen or fail to improve you should go to the Emergency Room.    General Neck and Back Pain    Both neck and back pain are usually caused by injury to the muscles or ligaments of the spine. Sometimes the disks that separate each bone of the spine may cause pain by pressing on a nearby nerve. Back and neck pain may appear after a sudden twisting or bending force (such as in a car accident), or sometimes after a simple awkward movement. In either case, muscle spasm is often present and adds to the pain.  Acute neck and back pain usually gets better in 1 to 2 weeks. Pain related to disk disease, arthritis in the spinal joints or spinal stenosis (narrowing of the spinal canal) can become chronic and last for months or years.  Back and neck pain are common problems. Most people feel better in 1 or 2 weeks, and most of the rest in 1 to 2 months. Most people can remain active.  People experience and describe pain differently.  · Pain can be sharp, stabbing, shooting, aching, cramping, or burning  · Movement, standing, bending, lifting, sitting, or walking may worsen the pain  · Pain can be localized to one spot or area, or it can be more generalized  · Pain can spread or radiate upwards, downwards, to the front, or go down your arms  · Muscle spasm may occur.  Most of the time mechanical problems with the muscles or spine cause the pain. it is usually caused by an injury, whether known or not, to the muscles or ligaments. While illnesses can cause back pain, it is usually not caused by a serious illness. Pain is usually related to physical activity, whether sports, exercise, work, or normal activity. Sometimes it can occur without an identifiable cause. This can happen simply by stretching or moving wrong, without noting  pain at the time. Other causes include:  · Overexertion, lifting, pushing, pulling incorrectly or too aggressively.  · Sudden twisting, bending or stretching from an accident (car or fall), or accidental movement.  · Poor posture  · Poor conditioning, lack of regular exercise  · Spinal disc disease or arthritis  · Stress  · Pregnancy, or illness like appendicitis, bladder or kidney infection, pelvic infections   Home care  · For neck pain: Use a comfortable pillow that supports the head and keeps the spine in a neutral position. The position of the head should not be tilted forward or backward.  · When in bed, try to find a position of comfort. A firm mattress is best. Try lying flat on your back with pillows under your knees. You can also try lying on your side with your knees bent up towards your chest and a pillow between your knees.  · At first, do not try to stretch out the sore spots. If there is a strain, it is not like the good soreness you get after exercising without an injury. In this case, stretching may make it worse.  · Avoid prolonged sitting, long car rides or travel. This puts more stress on the lower back than standing or walking.  · During the first 24 to 72 hours after an injury, apply an ice pack to the painful area for 20 minutes and then remove it for 20 minutes over a period of 60 to 90 minutes or several times a day.   · You can alternate ice and heat therapies. Talk with your healthcare provider about the best treatment for your back or neck pain. As a safety precaution, do not use a heating pad at bedtime. Sleeping with a heating pad can lead to skin burns or tissue damage.  · Therapeutic massage can help relax the back and neck muscles without stretching them.  · Be aware of safe lifting methods and do not lift anything over 15 pounds until all the pain is gone.  Medications  Talk to your healthcare provider before using medicine, especially if you have other medical problems or are  taking other medicines.  · You may use over-the-counter medicine to control pain, unless another pain medicine was prescribed. If you have chronic conditions like diabetes, liver or kidney disease, stomach ulcers,  gastrointestinal bleeding, or are taking blood thinner medicines.  · Be careful if you are given pain medicines, narcotics, or medicine for muscle spasm. They can cause drowsiness, and can affect your coordination, reflexes, and judgment. Do not drive or operate heavy machinery.  Follow-up care  Follow up with your healthcare provider, or as advised. Physical therapy or further tests may be needed.  If X-rays were taken, you will be notified of any new findings that may affect your care.  Call 911  Seek emergency medical care if any of the following occur:  · Trouble breathing  · Confusion  · Very drowsy or trouble awakening  · Fainting or loss of consciousness  · Rapid or very slow heart rate  · Loss of bowel or bladder control  When to seek medical advice  Call your healthcare provider right away if any of these occur:  · Pain becomes worse or spreads into your arms or legs  · Weakness, numbness or pain in one or both arms or legs  · Numbness in the groin area  · Difficulty walking  · Fever of 100.4ºF (38ºC) or higher, or as directed by your healthcare provider  Date Last Reviewed: 7/1/2016  © 7197-1924 [employer]. [address] [place] [address], [place] [address]. All rights reserved. This information is not intended as a substitute for professional medical care. Always follow your healthcare professional's instructions.

## 2019-08-20 DIAGNOSIS — K21.9 GASTROESOPHAGEAL REFLUX DISEASE, ESOPHAGITIS PRESENCE NOT SPECIFIED: ICD-10-CM

## 2019-08-20 RX ORDER — OMEPRAZOLE 40 MG/1
40 CAPSULE, DELAYED RELEASE ORAL
Qty: 180 CAPSULE | Refills: 1 | Status: SHIPPED | OUTPATIENT
Start: 2019-08-20 | End: 2020-01-09 | Stop reason: SDUPTHER

## 2019-08-29 ENCOUNTER — TELEPHONE (OUTPATIENT)
Dept: ORTHOPEDICS | Facility: CLINIC | Age: 59
End: 2019-08-29

## 2019-08-29 DIAGNOSIS — M51.36 DDD (DEGENERATIVE DISC DISEASE), LUMBAR: Primary | ICD-10-CM

## 2019-09-04 ENCOUNTER — PATIENT OUTREACH (OUTPATIENT)
Dept: ADMINISTRATIVE | Facility: HOSPITAL | Age: 59
End: 2019-09-04

## 2019-09-04 ENCOUNTER — TELEPHONE (OUTPATIENT)
Dept: ADMINISTRATIVE | Facility: HOSPITAL | Age: 59
End: 2019-09-04

## 2019-09-09 DIAGNOSIS — R10.9 FUNCTIONAL ABDOMINAL PAIN SYNDROME: ICD-10-CM

## 2019-09-09 RX ORDER — DICYCLOMINE HYDROCHLORIDE 10 MG/1
10 CAPSULE ORAL 3 TIMES DAILY PRN
Qty: 90 CAPSULE | Refills: 0 | Status: SHIPPED | OUTPATIENT
Start: 2019-09-09 | End: 2019-10-11

## 2019-09-15 ENCOUNTER — PATIENT OUTREACH (OUTPATIENT)
Dept: ADMINISTRATIVE | Facility: OTHER | Age: 59
End: 2019-09-15

## 2019-09-17 ENCOUNTER — PATIENT OUTREACH (OUTPATIENT)
Dept: ADMINISTRATIVE | Facility: HOSPITAL | Age: 59
End: 2019-09-17

## 2019-09-17 ENCOUNTER — TELEPHONE (OUTPATIENT)
Dept: ADMINISTRATIVE | Facility: HOSPITAL | Age: 59
End: 2019-09-17

## 2019-09-19 ENCOUNTER — PATIENT OUTREACH (OUTPATIENT)
Dept: ADMINISTRATIVE | Facility: HOSPITAL | Age: 59
End: 2019-09-19

## 2019-09-19 ENCOUNTER — TELEPHONE (OUTPATIENT)
Dept: ADMINISTRATIVE | Facility: HOSPITAL | Age: 59
End: 2019-09-19

## 2019-10-02 ENCOUNTER — PATIENT OUTREACH (OUTPATIENT)
Dept: ADMINISTRATIVE | Facility: OTHER | Age: 59
End: 2019-10-02

## 2019-10-04 ENCOUNTER — OFFICE VISIT (OUTPATIENT)
Dept: PAIN MEDICINE | Facility: CLINIC | Age: 59
End: 2019-10-04
Payer: COMMERCIAL

## 2019-10-04 VITALS
HEIGHT: 65 IN | OXYGEN SATURATION: 96 % | HEART RATE: 74 BPM | DIASTOLIC BLOOD PRESSURE: 74 MMHG | WEIGHT: 194.31 LBS | BODY MASS INDEX: 32.37 KG/M2 | SYSTOLIC BLOOD PRESSURE: 122 MMHG

## 2019-10-04 DIAGNOSIS — M50.30 DDD (DEGENERATIVE DISC DISEASE), CERVICAL: Primary | ICD-10-CM

## 2019-10-04 DIAGNOSIS — M47.812 OSTEOARTHRITIS OF CERVICAL SPINE, UNSPECIFIED SPINAL OSTEOARTHRITIS COMPLICATION STATUS: ICD-10-CM

## 2019-10-04 DIAGNOSIS — M79.18 MYOFASCIAL PAIN: ICD-10-CM

## 2019-10-04 PROCEDURE — 99244 PR OFFICE CONSULTATION,LEVEL IV: ICD-10-PCS | Mod: S$GLB,,, | Performed by: PAIN MEDICINE

## 2019-10-04 PROCEDURE — 99999 PR PBB SHADOW E&M-EST. PATIENT-LVL III: ICD-10-PCS | Mod: PBBFAC,,, | Performed by: PAIN MEDICINE

## 2019-10-04 PROCEDURE — 99999 PR PBB SHADOW E&M-EST. PATIENT-LVL III: CPT | Mod: PBBFAC,,, | Performed by: PAIN MEDICINE

## 2019-10-04 PROCEDURE — 99244 OFF/OP CNSLTJ NEW/EST MOD 40: CPT | Mod: S$GLB,,, | Performed by: PAIN MEDICINE

## 2019-10-04 RX ORDER — CYCLOBENZAPRINE HCL 10 MG
TABLET ORAL
Refills: 0 | COMMUNITY
Start: 2019-08-14 | End: 2019-10-11

## 2019-10-04 RX ORDER — ACETAMINOPHEN, DIPHENHYDRAMINE HCL, PHENYLEPHRINE HCL 325; 25; 5 MG/1; MG/1; MG/1
TABLET ORAL
COMMUNITY
Start: 2017-01-20 | End: 2019-10-11

## 2019-10-04 RX ORDER — SODIUM, POTASSIUM,MAG SULFATES 17.5-3.13G
SOLUTION, RECONSTITUTED, ORAL ORAL
Refills: 0 | COMMUNITY
Start: 2019-08-06 | End: 2019-10-11

## 2019-10-04 RX ORDER — TIZANIDINE 4 MG/1
16 TABLET ORAL NIGHTLY
Qty: 360 TABLET | Refills: 0 | Status: SHIPPED | OUTPATIENT
Start: 2019-10-04 | End: 2020-01-02
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
## 2019-10-11 ENCOUNTER — OFFICE VISIT (OUTPATIENT)
Dept: FAMILY MEDICINE | Facility: CLINIC | Age: 59
End: 2019-10-11
Payer: COMMERCIAL

## 2019-10-11 VITALS
SYSTOLIC BLOOD PRESSURE: 106 MMHG | HEART RATE: 67 BPM | BODY MASS INDEX: 32.08 KG/M2 | RESPIRATION RATE: 16 BRPM | WEIGHT: 192.56 LBS | HEIGHT: 65 IN | DIASTOLIC BLOOD PRESSURE: 62 MMHG | OXYGEN SATURATION: 95 % | TEMPERATURE: 98 F

## 2019-10-11 DIAGNOSIS — M25.562 CHRONIC PAIN OF LEFT KNEE: Primary | ICD-10-CM

## 2019-10-11 DIAGNOSIS — I25.10 CORONARY ARTERY DISEASE INVOLVING NATIVE CORONARY ARTERY OF NATIVE HEART WITHOUT ANGINA PECTORIS: ICD-10-CM

## 2019-10-11 DIAGNOSIS — Z23 NEED FOR INFLUENZA VACCINATION: ICD-10-CM

## 2019-10-11 DIAGNOSIS — S86.899A MEDIAL TIBIAL STRESS SYNDROME, UNSPECIFIED LATERALITY, INITIAL ENCOUNTER: ICD-10-CM

## 2019-10-11 DIAGNOSIS — I10 ESSENTIAL HYPERTENSION: ICD-10-CM

## 2019-10-11 DIAGNOSIS — G89.29 CHRONIC PAIN OF LEFT KNEE: Primary | ICD-10-CM

## 2019-10-11 DIAGNOSIS — B37.9 YEAST INFECTION: ICD-10-CM

## 2019-10-11 DIAGNOSIS — J01.90 ACUTE BACTERIAL RHINOSINUSITIS: ICD-10-CM

## 2019-10-11 DIAGNOSIS — K21.9 GASTROESOPHAGEAL REFLUX DISEASE, ESOPHAGITIS PRESENCE NOT SPECIFIED: ICD-10-CM

## 2019-10-11 DIAGNOSIS — B96.89 ACUTE BACTERIAL RHINOSINUSITIS: ICD-10-CM

## 2019-10-11 PROCEDURE — 99999 PR PBB SHADOW E&M-EST. PATIENT-LVL V: CPT | Mod: PBBFAC,,, | Performed by: INTERNAL MEDICINE

## 2019-10-11 PROCEDURE — 99999 PR PBB SHADOW E&M-EST. PATIENT-LVL V: ICD-10-PCS | Mod: PBBFAC,,, | Performed by: INTERNAL MEDICINE

## 2019-10-11 PROCEDURE — 90471 IMMUNIZATION ADMIN: CPT | Mod: S$GLB,,, | Performed by: INTERNAL MEDICINE

## 2019-10-11 PROCEDURE — 3078F DIAST BP <80 MM HG: CPT | Mod: CPTII,S$GLB,, | Performed by: INTERNAL MEDICINE

## 2019-10-11 PROCEDURE — 3074F PR MOST RECENT SYSTOLIC BLOOD PRESSURE < 130 MM HG: ICD-10-PCS | Mod: CPTII,S$GLB,, | Performed by: INTERNAL MEDICINE

## 2019-10-11 PROCEDURE — 3078F PR MOST RECENT DIASTOLIC BLOOD PRESSURE < 80 MM HG: ICD-10-PCS | Mod: CPTII,S$GLB,, | Performed by: INTERNAL MEDICINE

## 2019-10-11 PROCEDURE — 90686 IIV4 VACC NO PRSV 0.5 ML IM: CPT | Mod: S$GLB,,, | Performed by: INTERNAL MEDICINE

## 2019-10-11 PROCEDURE — 99215 OFFICE O/P EST HI 40 MIN: CPT | Mod: 25,S$GLB,, | Performed by: INTERNAL MEDICINE

## 2019-10-11 PROCEDURE — 99215 PR OFFICE/OUTPT VISIT, EST, LEVL V, 40-54 MIN: ICD-10-PCS | Mod: 25,S$GLB,, | Performed by: INTERNAL MEDICINE

## 2019-10-11 PROCEDURE — 90471 FLU VACCINE (QUAD) GREATER THAN OR EQUAL TO 3YO PRESERVATIVE FREE IM: ICD-10-PCS | Mod: S$GLB,,, | Performed by: INTERNAL MEDICINE

## 2019-10-11 PROCEDURE — 3074F SYST BP LT 130 MM HG: CPT | Mod: CPTII,S$GLB,, | Performed by: INTERNAL MEDICINE

## 2019-10-11 PROCEDURE — 90686 FLU VACCINE (QUAD) GREATER THAN OR EQUAL TO 3YO PRESERVATIVE FREE IM: ICD-10-PCS | Mod: S$GLB,,, | Performed by: INTERNAL MEDICINE

## 2019-10-11 PROCEDURE — 3008F PR BODY MASS INDEX (BMI) DOCUMENTED: ICD-10-PCS | Mod: CPTII,S$GLB,, | Performed by: INTERNAL MEDICINE

## 2019-10-11 PROCEDURE — 3008F BODY MASS INDEX DOCD: CPT | Mod: CPTII,S$GLB,, | Performed by: INTERNAL MEDICINE

## 2019-10-11 RX ORDER — CLOPIDOGREL BISULFATE 75 MG/1
75 TABLET ORAL DAILY
COMMUNITY
End: 2019-10-11

## 2019-10-11 RX ORDER — FLUCONAZOLE 150 MG/1
150 TABLET ORAL ONCE
Qty: 2 TABLET | Refills: 0 | Status: SHIPPED | OUTPATIENT
Start: 2019-10-11 | End: 2019-10-11

## 2019-10-11 RX ORDER — IBUPROFEN 600 MG/1
600 TABLET ORAL 3 TIMES DAILY
COMMUNITY
End: 2020-05-29

## 2019-10-11 RX ORDER — AMOXICILLIN AND CLAVULANATE POTASSIUM 875; 125 MG/1; MG/1
1 TABLET, FILM COATED ORAL 2 TIMES DAILY
Qty: 14 TABLET | Refills: 0 | Status: SHIPPED | OUTPATIENT
Start: 2019-10-11 | End: 2019-10-18
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
## 2019-10-11 NOTE — PATIENT INSTRUCTIONS
Shin Splints (Medial Tibial Stress Syndrome)    Pain felt in the front of your lower leg is often called shin splints. One common cause of this pain is tendinitis--inflammation of tendons (tough, cordlike bands of tissue that connect muscle to bone). When the tendons of the muscles near the shinbone (tibia) become inflamed, the pain is felt along the shin. Shin splints often affect athletes and runners, and are commonly due to overuse. A less common cause is flat feet with low arches.    Symptoms of shin splints  Symptoms of shin splints often start as a dull ache that gets worse over time. Pain may also be sharp or stabbing. Resting your legs often relieves the symptoms. Pain may occur both during or after activity. Later, the pain may become continuous with almost any activity.    Your evaluation  Your doctor will ask you questions about your activities and your health history. Be sure to tell your doctor about possible injuries. The diagnosis is usually made through the history and physical exam. There are no tests for shin splints, but your doctor may want to do some tests to rule out a stress fracture in your shinbone. These tests may include an X-ray, bone scan, or MRI (magnetic resonance imaging) test.      Treating shin splints  Follow these and any other instructions you are given.  · Rest: Cut down on running and high-impact sports, or avoid them completely to allow your legs to rest and the injury to heal.  · Ice: Put ice on the painful areas. Use an ice pack or bag of frozen peas. Put a thin cloth between the cold source and your skin. Ice for 15 minutes every 3 hours.  · Medications: Take nonsteroidal anti-inflammatory medicines (NSAIDs), such as ibuprofen, as directed by your doctor.  ·   Preventing shin splints  To help prevent shin splints in the future:  · Warm up before you run. Do gentle calf-stretching exercises.  · Be careful not to overtrain.  · Avoid running on hard or  uneven surfaces.  · If you have flat feet or low arches, consider orthotics or insoles for correction.  Be sure you are using running shoes with good support and cushioned soles.    Date Last Reviewed: 11/10/2015  © 6070-0046 [employer]. [address] [place] [address], [place] [address]. All rights reserved. This information is not intended as a substitute for professional medical care. Always follow your healthcare professional's instructions.

## 2019-10-17 DIAGNOSIS — R10.9 FUNCTIONAL ABDOMINAL PAIN SYNDROME: ICD-10-CM

## 2019-10-17 RX ORDER — DICYCLOMINE HYDROCHLORIDE 10 MG/1
10 CAPSULE ORAL 3 TIMES DAILY PRN
Qty: 90 CAPSULE | Refills: 0 | Status: SHIPPED | OUTPATIENT
Start: 2019-10-17 | End: 2020-03-05 | Stop reason: SDUPTHER

## 2019-11-11 DIAGNOSIS — F41.9 ANXIETY: ICD-10-CM

## 2019-11-11 RX ORDER — FLUOXETINE HYDROCHLORIDE 20 MG/1
20 CAPSULE ORAL EVERY MORNING
Qty: 90 CAPSULE | Refills: 1 | Status: SHIPPED | OUTPATIENT
Start: 2019-11-11 | End: 2020-04-23 | Stop reason: SDUPTHER

## 2019-11-22 ENCOUNTER — OFFICE VISIT (OUTPATIENT)
Dept: PAIN MEDICINE | Facility: CLINIC | Age: 59
End: 2019-11-22
Payer: COMMERCIAL

## 2019-11-22 VITALS
BODY MASS INDEX: 32.19 KG/M2 | HEIGHT: 65 IN | HEART RATE: 57 BPM | SYSTOLIC BLOOD PRESSURE: 136 MMHG | OXYGEN SATURATION: 96 % | DIASTOLIC BLOOD PRESSURE: 69 MMHG | WEIGHT: 193.19 LBS

## 2019-11-22 DIAGNOSIS — G89.29 CHRONIC PAIN OF LEFT KNEE: ICD-10-CM

## 2019-11-22 DIAGNOSIS — M25.562 CHRONIC PAIN OF LEFT KNEE: ICD-10-CM

## 2019-11-22 DIAGNOSIS — M47.812 CERVICAL SPONDYLOSIS: ICD-10-CM

## 2019-11-22 DIAGNOSIS — M50.30 DDD (DEGENERATIVE DISC DISEASE), CERVICAL: Primary | ICD-10-CM

## 2019-11-22 PROCEDURE — 99214 OFFICE O/P EST MOD 30 MIN: CPT | Mod: 25,S$GLB,, | Performed by: PAIN MEDICINE

## 2019-11-22 PROCEDURE — 3078F PR MOST RECENT DIASTOLIC BLOOD PRESSURE < 80 MM HG: ICD-10-PCS | Mod: CPTII,S$GLB,, | Performed by: PAIN MEDICINE

## 2019-11-22 PROCEDURE — 3008F PR BODY MASS INDEX (BMI) DOCUMENTED: ICD-10-PCS | Mod: CPTII,S$GLB,, | Performed by: PAIN MEDICINE

## 2019-11-22 PROCEDURE — 20610 PR DRAIN/INJECT LARGE JOINT/BURSA: ICD-10-PCS | Mod: LT,S$GLB,, | Performed by: PAIN MEDICINE

## 2019-11-22 PROCEDURE — 99214 PR OFFICE/OUTPT VISIT, EST, LEVL IV, 30-39 MIN: ICD-10-PCS | Mod: 25,S$GLB,, | Performed by: PAIN MEDICINE

## 2019-11-22 PROCEDURE — 99999 PR PBB SHADOW E&M-EST. PATIENT-LVL III: ICD-10-PCS | Mod: PBBFAC,,, | Performed by: PAIN MEDICINE

## 2019-11-22 PROCEDURE — 99999 PR PBB SHADOW E&M-EST. PATIENT-LVL III: CPT | Mod: PBBFAC,,, | Performed by: PAIN MEDICINE

## 2019-11-22 PROCEDURE — 3078F DIAST BP <80 MM HG: CPT | Mod: CPTII,S$GLB,, | Performed by: PAIN MEDICINE

## 2019-11-22 PROCEDURE — 3075F PR MOST RECENT SYSTOLIC BLOOD PRESS GE 130-139MM HG: ICD-10-PCS | Mod: CPTII,S$GLB,, | Performed by: PAIN MEDICINE

## 2019-11-22 PROCEDURE — 20610 DRAIN/INJ JOINT/BURSA W/O US: CPT | Mod: LT,S$GLB,, | Performed by: PAIN MEDICINE

## 2019-11-22 PROCEDURE — 3008F BODY MASS INDEX DOCD: CPT | Mod: CPTII,S$GLB,, | Performed by: PAIN MEDICINE

## 2019-11-22 PROCEDURE — 3075F SYST BP GE 130 - 139MM HG: CPT | Mod: CPTII,S$GLB,, | Performed by: PAIN MEDICINE

## 2019-11-22 RX ORDER — CLOPIDOGREL BISULFATE 75 MG/1
TABLET ORAL
COMMUNITY
Start: 2019-10-28 | End: 2020-07-31

## 2019-11-22 RX ORDER — TRIAMCINOLONE ACETONIDE 40 MG/ML
40 INJECTION, SUSPENSION INTRA-ARTICULAR; INTRAMUSCULAR
Status: COMPLETED | OUTPATIENT
Start: 2019-11-22 | End: 2019-11-22

## 2019-11-22 RX ORDER — TRAMADOL HYDROCHLORIDE 50 MG/1
50 TABLET ORAL
COMMUNITY
End: 2020-07-27 | Stop reason: SDUPTHER

## 2019-11-22 RX ORDER — FLUCONAZOLE 150 MG/1
TABLET ORAL
COMMUNITY
Start: 2019-10-11 | End: 2020-09-25

## 2019-11-22 RX ORDER — ESOMEPRAZOLE MAGNESIUM 40 MG/1
40 CAPSULE, DELAYED RELEASE ORAL
COMMUNITY
Start: 2014-10-23 | End: 2020-01-09

## 2019-11-22 RX ORDER — CYCLOBENZAPRINE HCL 10 MG
10 TABLET ORAL
COMMUNITY
End: 2020-03-05 | Stop reason: SDUPTHER

## 2019-11-22 RX ORDER — NITROGLYCERIN 0.4 MG/1
0.4 TABLET SUBLINGUAL
COMMUNITY
Start: 2014-09-08

## 2019-11-22 RX ADMIN — TRIAMCINOLONE ACETONIDE 40 MG: 40 INJECTION, SUSPENSION INTRA-ARTICULAR; INTRAMUSCULAR at 12:11

## 2019-11-22 NOTE — PROGRESS NOTES
Mrs. Zurita will be scheduled for Left C4-6 MBB on 01/08/2020.  Reviewed the pre-procedure instructions listed below with her- copy also provided.  Instructed patient to notify clinic if she begins feeling ill, runs a fever, is prescribed antibiotics, and/or has any outpatient procedures within the two weeks leading up to this procedure.  Instructed patient to report to [facility], 2nd Floor Endoscopy Registration Desk.  All questions answered- patient verbalized understanding.  Clearance to hold ASA and Plavix x7 days will be sent to Dr. Angela.     Day of Procedure  - Ensure you have obtained an arrival time from the Pain Management Staff  o Procedure Area will call 1-3 days in advance with your arrival time.  Please check any voicemails received.  o If you arrive past your scheduled procedure time, you may be asked to reschedule your procedure.  - Ensure you have a  with you to remain present throughout your procedure for your safety.  o If you arrive without a responsible adult to stay with you and drive you home, you may be asked to reschedule your procedure.  - Take all of your prescribed medications (exceptions noted below) with a small amount of water  - This is NOT a fasting procedure, you may have a light meal before coming.  - Wear comfortable clothing (loose fitting pants).  - You may wear glasses, dentures, contact lenses, and/or hearing aids. Please remove all jewelry and metal hairpins.  - Notify the nurse during the intake process if you are allergic to any medications, if you are diabetic, or if you are not feeling well  - Contact the Pain Management Clinic with the following:  o A fever greater than 100° (degrees)   o Feel ill, have any type of infection, or are taking antibiotics now or within the two (2) weeks leading up to this procedure  o Have any outpatient procedures within the two (2) weeks leading up to this procedure (colonoscopy, major dental work, etc.)    IF you  are taking blood thinners: Only upon receiving clearance and notification from the Pain Management Department  7 Days Prior to Your Procedure:  - Stop taking Plavix/Clopridogrel, Effient/Prasugel, ASA  5 Days Prior to Your Procedure:  - Stop taking Coumadin/Warfarin.  An INR may be drawn prior to your procedure.  If labs are required, you will need to arrive earlier than your scheduled arrival time.  - Stop taking Pradaxa/Dabigatra,  Brilinta/ Ticagrelor  3 Days Prior to Your Procedure:  - Stop taking Xarelto/Rivaroxaban, Eliquis/Apixaban, Aggrenox/Dipyridamole, Reopro/Abciximab
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
## 2019-11-22 NOTE — LETTER
November 22, 2019      [facility]  [address], [address]  [place] [address]  Phone: [phone]  Fax: [phone]       Patient: Alyce Zurita   YOB: 1960  Date of Visit: 11/22/2019    To Whom It May Concern:    Adwoa Zurita  was at [facility] on 11/22/2019. She may return to work/school on 11/22/2019 with no restrictions. If you have any questions or concerns, or if I can be of further assistance, please do not hesitate to contact me.    Sincerely,    Anay Le RN

## 2019-12-20 ENCOUNTER — TELEPHONE (OUTPATIENT)
Dept: PAIN MEDICINE | Facility: CLINIC | Age: 59
End: 2019-12-20

## 2019-12-20 NOTE — TELEPHONE ENCOUNTER
Informed patient that the doses of ASA and Plavix will be on 12/31/2019 until after the procedure scheduled on 1/8/20- verbalized understanding.

## 2019-12-20 NOTE — TELEPHONE ENCOUNTER
----- Message from Chaitanya Angela MD sent at 11/22/2019  4:02 PM CST -----  Regarding: RE: Clearance to Hold Anticoagulants  OK to hold ASA/Plavix.    [facility]    ----- Message -----  From: Jossue Wong MA  Sent: 11/22/2019  12:44 PM CST  To: Chaitanya Angela MD  Subject: FW: Clearance to Hold Anticoagulants                 ----- Message -----  From: Anay Le RN  Sent: 11/22/2019  12:39 PM CST  To: Coreen VIDAL Staff  Subject: Clearance to Hold Anticoagulants                 Dr. Angela,    Mrs. Zurita is scheduled to have Left C4, C5, C6 MBB on 01/08/2020.  Dr. Luna is requesting that the patient stop taking ASA and Plavix seven days prior to this procedure.  If the blocks are effective, the patient will then be scheduled for a Radiofrequency Ablation of the Nerves.  The medications would need to be held for that procedure as well.  Please indicate whether or not she is able to stop taking the medications listed above.  Feel free to contact the clinic with any questions or concerns you may have.      Thank you in advance for your time and expertise,    ELENA Jarrell, RN

## 2019-12-23 RX ORDER — HYDROCHLOROTHIAZIDE 12.5 MG/1
12.5 CAPSULE ORAL DAILY
Qty: 90 CAPSULE | Refills: 1 | Status: SHIPPED | OUTPATIENT
Start: 2019-12-23 | End: 2020-03-30 | Stop reason: SDUPTHER

## 2019-12-23 NOTE — TELEPHONE ENCOUNTER
Last Office Visit Info:   The patient's last visit with Carrillo Guzman MD was on 10/11/2019.    The patient's last visit in current department was on 7/31/2019.        Last CBC Results:   Lab Results   Component Value Date    WBC 6.45 02/20/2019    HGB 12.1 02/20/2019    HCT 38.9 02/20/2019     (H) 02/20/2019       Last CMP Results  Lab Results   Component Value Date     02/20/2019    K 3.8 02/20/2019     02/20/2019    CO2 24 02/20/2019    BUN 10 02/20/2019    CREATININE 0.8 02/20/2019    CALCIUM 9.1 02/20/2019    ALBUMIN 3.6 11/27/2018    AST 20 11/27/2018    ALT 19 11/27/2018       Last Lipids  Lab Results   Component Value Date    CHOL 231 (H) 08/23/2018    TRIG 140 08/23/2018    HDL 46 08/23/2018    LDLCALC 157.0 08/23/2018       Last A1C  Lab Results   Component Value Date    HGBA1C 5.4 08/23/2018       Last TSH  Lab Results   Component Value Date    TSH 1.319 11/27/2018         Current Med Refills  Medication List with Changes/Refills   Current Medications    ASPIRIN (ECOTRIN) 81 MG EC TABLET    Take 1 tablet (81 mg total) by mouth once daily.       Start Date: 2/4/2019  End Date: --    CHOLECALCIFEROL, VITAMIN D3, (VITAMIN D3 ORAL)    Take by mouth 2 (two) times daily.       Start Date: --        End Date: --    CLOPIDOGREL (PLAVIX) 75 MG TABLET           Start Date: 10/28/2019End Date: --    CYCLOBENZAPRINE (FLEXERIL) 10 MG TABLET    Take 10 mg by mouth.       Start Date: --        End Date: --    DEXTROMETHORPHAN-GUAIFENESIN  MG (MUCINEX DM)  MG PER 12 HR TABLET    Take 1 tablet by mouth every 12 (twelve) hours.       Start Date: --        End Date: --    DICYCLOMINE (BENTYL) 10 MG CAPSULE    Take 1 capsule (10 mg total) by mouth 3 (three) times daily as needed.       Start Date: 10/17/2019End Date: --    ESOMEPRAZOLE (NEXIUM) 40 MG CAPSULE    Take 40 mg by mouth.       Start Date: 10/23/2014End Date: --    FLUCONAZOLE (DIFLUCAN) 150 MG TAB           Start Date:  10/11/2019End Date: --    FLUOXETINE 20 MG CAPSULE    Take 1 capsule (20 mg total) by mouth every morning.       Start Date: 11/11/2019End Date: --    FLUTICASONE (FLONASE) 50 MCG/ACTUATION NASAL SPRAY    2 sprays by Each Nare route once daily.       Start Date: --        End Date: --    GABAPENTIN (NEURONTIN) 300 MG CAPSULE    Take 2 tablet in the morning and 2 tablet nightly       Start Date: 7/12/2019 End Date: --    GLUCOSAMINE/CHONDR QUINTANILLA A SOD (OSTEO BI-FLEX ORAL)    Take by mouth 2 (two) times daily.       Start Date: --        End Date: --    HYDROCHLOROTHIAZIDE (MICROZIDE) 12.5 MG CAPSULE    Take 1 capsule (12.5 mg total) by mouth once daily.       Start Date: 6/17/2019 End Date: --    IBUPROFEN (ADVIL,MOTRIN) 600 MG TABLET    Take 600 mg by mouth 3 (three) times daily.       Start Date: --        End Date: --    METOPROLOL TARTRATE (LOPRESSOR) 25 MG TABLET    Take 1 tablet (25 mg total) by mouth once daily.       Start Date: 6/7/2019  End Date: --    MONTELUKAST (SINGULAIR) 10 MG TABLET    Take 1 tablet (10 mg total) by mouth every evening.       Start Date: 7/31/2019 End Date: --    NITROGLYCERIN (NITROSTAT) 0.4 MG SL TABLET    Place 0.4 mg under the tongue.       Start Date: 9/8/2014  End Date: --    OMEPRAZOLE (PRILOSEC) 40 MG CAPSULE    Take 1 capsule (40 mg total) by mouth 2 (two) times daily before meals.       Start Date: 8/20/2019 End Date: 8/19/2020    POTASSIUM CHLORIDE SA (K-DUR,KLOR-CON) 20 MEQ TABLET    Take 1 tablet (20 mEq total) by mouth 2 (two) times daily.       Start Date: 7/22/2019 End Date: --    RANITIDINE (ZANTAC) 300 MG TABLET    Take 1 tablet (300 mg total) by mouth 2 (two) times daily.       Start Date: 7/12/2019 End Date: --    TIZANIDINE (ZANAFLEX) 4 MG TABLET    Take 4 tablets (16 mg total) by mouth every evening.       Start Date: 10/4/2019 End Date: 1/2/2020    TRAMADOL (ULTRAM) 50 MG TABLET    Take 50 mg by mouth.       Start Date: --        End Date: --    TRAZODONE (DESYREL)  150 MG TABLET    Take 0.5 tablets (75 mg total) by mouth every evening.       Start Date: 7/12/2019 End Date: 7/11/2020       Order(s) placed per written order guidelines:     Please advise.

## 2020-01-03 ENCOUNTER — TELEPHONE (OUTPATIENT)
Dept: PAIN MEDICINE | Facility: CLINIC | Age: 60
End: 2020-01-03

## 2020-01-03 NOTE — TELEPHONE ENCOUNTER
Reviewed pre-procedure instructions with Sun Alyce, as well as provided arrival time of 0945.  All questions answered- patient verbalized understanding.

## 2020-01-06 DIAGNOSIS — I10 ESSENTIAL HYPERTENSION: ICD-10-CM

## 2020-01-08 ENCOUNTER — HOSPITAL ENCOUNTER (OUTPATIENT)
Facility: HOSPITAL | Age: 60
Discharge: HOME | End: 2020-01-08
Attending: PAIN MEDICINE | Admitting: PAIN MEDICINE
Payer: COMMERCIAL

## 2020-01-08 VITALS
DIASTOLIC BLOOD PRESSURE: 65 MMHG | HEART RATE: 93 BPM | RESPIRATION RATE: 20 BRPM | OXYGEN SATURATION: 97 % | SYSTOLIC BLOOD PRESSURE: 132 MMHG | TEMPERATURE: 98 F

## 2020-01-08 DIAGNOSIS — M47.812 CERVICAL SPONDYLOSIS: Primary | ICD-10-CM

## 2020-01-08 PROCEDURE — 64490 INJ PARAVERT F JNT C/T 1 LEV: CPT | Performed by: PAIN MEDICINE

## 2020-01-08 PROCEDURE — 64490 INJ PARAVERT F JNT C/T 1 LEV: CPT | Mod: LT,,, | Performed by: PAIN MEDICINE

## 2020-01-08 PROCEDURE — 64491 INJ PARAVERT F JNT C/T 2 LEV: CPT | Performed by: PAIN MEDICINE

## 2020-01-08 PROCEDURE — 25000003 PHARM REV CODE 250: Performed by: PAIN MEDICINE

## 2020-01-08 PROCEDURE — 64492 INJ PARAVERT F JNT C/T 3 LEV: CPT | Performed by: PAIN MEDICINE

## 2020-01-08 PROCEDURE — 64491 INJ PARAVERT F JNT C/T 2 LEV: CPT | Mod: LT,,, | Performed by: PAIN MEDICINE

## 2020-01-08 PROCEDURE — 64491 PR INJ DX/THER AGNT PARAVERT FACET JOINT,IMG GUIDE,CERV/THORAC, 2ND LEVEL: ICD-10-PCS | Mod: LT,,, | Performed by: PAIN MEDICINE

## 2020-01-08 PROCEDURE — 64490 PR INJ DX/THER AGNT PARAVERT FACET JOINT,IMG GUIDE,CERV/THORAC, 1ST LEVEL: ICD-10-PCS | Mod: LT,,, | Performed by: PAIN MEDICINE

## 2020-01-08 RX ORDER — LIDOCAINE HYDROCHLORIDE 10 MG/ML
1 INJECTION, SOLUTION EPIDURAL; INFILTRATION; INTRACAUDAL; PERINEURAL ONCE
Status: COMPLETED | OUTPATIENT
Start: 2020-01-08 | End: 2020-01-08

## 2020-01-08 RX ORDER — LIDOCAINE HYDROCHLORIDE 20 MG/ML
INJECTION, SOLUTION INFILTRATION; PERINEURAL
Status: DISCONTINUED
Start: 2020-01-08 | End: 2020-01-08 | Stop reason: HOSPADM

## 2020-01-08 RX ORDER — LIDOCAINE HYDROCHLORIDE 10 MG/ML
INJECTION, SOLUTION EPIDURAL; INFILTRATION; INTRACAUDAL; PERINEURAL
Status: DISCONTINUED
Start: 2020-01-08 | End: 2020-01-08 | Stop reason: HOSPADM

## 2020-01-08 RX ORDER — LIDOCAINE HYDROCHLORIDE 20 MG/ML
10 INJECTION, SOLUTION INFILTRATION; PERINEURAL ONCE
Status: COMPLETED | OUTPATIENT
Start: 2020-01-08 | End: 2020-01-08

## 2020-01-08 NOTE — DISCHARGE INSTRUCTIONS
Home Care Instructions Pain Management:    1. DIET:   You may resume your normal diet today.   2. BATHING:   You may shower with luke warm water.  3. DRESSING:   You may remove your bandage today.   4. ACTIVITY LEVEL:   You may resume your normal activities 24 hrs after your procedure.  5. MEDICATIONS:   You may resume your normal medications today.   6. SPECIAL INSTRUCTIONS:   No heat to the injection site for 24 hrs including, bath or shower, heating pad, moist heat, or hot tubs.    Use ice pack to injection site for any pain or discomfort.  Apply ice packs for 20 minute intervals as needed.   If you have received any sedatives by mouth today you may not drive for 12 hours.    If you have received any sedation through your IV, you may not drive for 24 hrs.     PLEASE CALL YOUR DOCTOR IF:  1. Redness or swelling around the injection site.  2. Fever of 101 degrees  3. Drainage (pus) from the injection site.  4. For any continuous bleeding (some dried blood over the incision is normal.)    FOR EMERGENCIES:   If any unusual problems or difficulties occur during clinic hours, call [phone] or [phone].       1. DIET:   You may resume your normal diet today.   2. BATHING:   You may shower with luke warm water.  3. DRESSING:   You may remove your bandage today.   4. ACTIVITY LEVEL:   You may resume your normal activities today. This procedure was a test. Do all of the normal activities that you would do, even if it causes you pain.     Keep a diary of your pain score.     5. MEDICATIONS:   You may resume your normal medications today.     6. SPECIAL INSTRUCTIONS:   No heat to the injection site for 24 hrs including, bath or shower, heating pad, moist heat, or hot tubs.    Use ice pack to injection site for any pain or discomfort.  Apply ice packs for 20 minute intervals as needed.   If you have received any sedatives by mouth today you may not drive for 12 hours.    If you have received any sedation through your IV, you  may not drive for 24 hrs.     PLEASE CALL YOUR DOCTOR IF:  1. Redness or swelling around the injection site.  2. Fever of 101 degrees  3. Drainage (pus) from the injection site.  4. For any continuous bleeding (some dried blood over the incision is normal.)    FOR EMERGENCIES:   If any unusual problems or difficulties occur during clinic hours, call [phone] or [phone].

## 2020-01-08 NOTE — DISCHARGE SUMMARY
Discharge Diagnosis:Cervical spondylosis [M47.812]  Condition on Discharge: Stable.  Diet on Discharge: Same as before.  Activity: as per instruction sheet.  Discharge to: Home with a responsible adult.  Follow up: as per Discharge instructions

## 2020-01-08 NOTE — H&P
Subjective:     Patient ID: Alyce Zurita is a 59 y.o. female    Chief Complaint: Neck pain    Referred by: Chaitanya Luna Jr*      HPI:    Alyce Zurita is a 59 y.o. female who presents today for left C4, C5 and C6 MBBs. She denies any changes in the quality or location of the pain.        Review of Systems   Constitutional: Negative for activity change, appetite change, chills, fatigue, fever and unexpected weight change.   HENT: Negative for hearing loss.    Eyes: Negative for visual disturbance.   Respiratory: Negative for chest tightness and shortness of breath.    Cardiovascular: Negative for chest pain.   Gastrointestinal: Negative for abdominal pain, constipation, diarrhea, nausea and vomiting.   Genitourinary: Negative for difficulty urinating.   Musculoskeletal: Positive for arthralgias, myalgias, neck pain and neck stiffness. Negative for back pain and gait problem.   Skin: Negative for rash.   Neurological: Negative for dizziness, weakness, light-headedness, numbness and headaches.   Psychiatric/Behavioral: Positive for sleep disturbance. Negative for hallucinations and suicidal ideas. The patient is not nervous/anxious.        Past Medical History:   Diagnosis Date    Allergy     Anticoagulant long-term use     Anxiety 11/16/2018    Arthritis     Breast injury     left 10 yrs ago/ hit in chest    Carpal tunnel syndrome of left wrist 9/10/2018    GERD (gastroesophageal reflux disease)     Hyperlipemia     Hypertension, uncontrolled 8/17/2018    Interstitial cystitis     Kidney problem     Meningitis     3 months old    PONV (postoperative nausea and vomiting)     pt needs meds for PONV    Tachycardia        Past Surgical History:   Procedure Laterality Date    APPENDECTOMY      CARDIAC SURGERY      ABLATION    Heart Procedure       HYSTERECTOMY  1993    KIDNEY SURGERY      LEFT HEART CATHETERIZATION Left 2/25/2019    Procedure: Left heart cath 12pm start, R rad access;  Surgeon:  Chaitanya Angela MD;  Location: [facility] CATH LAB;  Service: Cardiology;  Laterality: Left;  RN PREOP 2/20/19  ARRIVAL TIME 10AM    OOPHORECTOMY Bilateral 1993    TEMPOROMANDIBULAR JOINT SURGERY         Social History     Socioeconomic History    Marital status:      Spouse name: Not on file    Number of children: Not on file    Years of education: Not on file    Highest education level: Not on file   Occupational History    Not on file   Social Needs    Financial resource strain: Not on file    Food insecurity:     Worry: Not on file     Inability: Not on file    Transportation needs:     Medical: Not on file     Non-medical: Not on file   Tobacco Use    Smoking status: Never Smoker    Smokeless tobacco: Never Used   Substance and Sexual Activity    Alcohol use: No    Drug use: No    Sexual activity: Yes     Partners: Male     Birth control/protection: None     Comment: 7/20/18  with same partner for 40 years    Lifestyle    Physical activity:     Days per week: Not on file     Minutes per session: Not on file    Stress: Not on file   Relationships    Social connections:     Talks on phone: Not on file     Gets together: Not on file     Attends Orthodox service: Not on file     Active member of club or organization: Not on file     Attends meetings of clubs or organizations: Not on file     Relationship status: Not on file   Other Topics Concern    Not on file   Social History Narrative    Not on file       Review of patient's allergies indicates:   Allergen Reactions    Other omega-3s Nausea And Vomiting     chlorine    Rofecoxib Nausea And Vomiting       No current facility-administered medications on file prior to encounter.      Current Outpatient Medications on File Prior to Encounter   Medication Sig Dispense Refill    cholecalciferol, vitamin D3, (VITAMIN D3 ORAL) Take by mouth 2 (two) times daily.      cyclobenzaprine (FLEXERIL) 10 MG tablet Take 10 mg by mouth.       dextromethorphan-guaifenesin  mg (MUCINEX DM)  mg per 12 hr tablet Take 1 tablet by mouth every 12 (twelve) hours.      dicyclomine (BENTYL) 10 MG capsule Take 1 capsule (10 mg total) by mouth 3 (three) times daily as needed. 90 capsule 0    esomeprazole (NEXIUM) 40 MG capsule Take 40 mg by mouth.      FLUoxetine 20 MG capsule Take 1 capsule (20 mg total) by mouth every morning. 90 capsule 1    fluticasone (FLONASE) 50 mcg/actuation nasal spray 2 sprays by Each Nare route once daily.      gabapentin (NEURONTIN) 300 MG capsule Take 2 tablet in the morning and 2 tablet nightly 360 capsule 3    glucosamine/chondr gray A sod (OSTEO BI-FLEX ORAL) Take by mouth 2 (two) times daily.      ibuprofen (ADVIL,MOTRIN) 600 MG tablet Take 600 mg by mouth 3 (three) times daily.      metoprolol tartrate (LOPRESSOR) 25 MG tablet Take 1 tablet (25 mg total) by mouth once daily. 90 tablet 2    montelukast (SINGULAIR) 10 mg tablet Take 1 tablet (10 mg total) by mouth every evening. 90 tablet 2    omeprazole (PRILOSEC) 40 MG capsule Take 1 capsule (40 mg total) by mouth 2 (two) times daily before meals. 180 capsule 1    potassium chloride SA (K-DUR,KLOR-CON) 20 MEQ tablet Take 1 tablet (20 mEq total) by mouth 2 (two) times daily. 90 tablet 1    ranitidine (ZANTAC) 300 MG tablet Take 1 tablet (300 mg total) by mouth 2 (two) times daily. 180 tablet 3    traMADol (ULTRAM) 50 mg tablet Take 50 mg by mouth.      traZODone (DESYREL) 150 MG tablet Take 0.5 tablets (75 mg total) by mouth every evening. 90 tablet 1    aspirin (ECOTRIN) 81 MG EC tablet Take 1 tablet (81 mg total) by mouth once daily. 90 tablet 3    clopidogrel (PLAVIX) 75 mg tablet       fluconazole (DIFLUCAN) 150 MG Tab       nitroGLYCERIN (NITROSTAT) 0.4 MG SL tablet Place 0.4 mg under the tongue.         Objective:      /72 (BP Location: Left arm, Patient Position: Lying)   Pulse 72   Temp 98.3 °F (36.8 °C) (Oral)   Resp 18   LMP  (LMP  Unknown)   SpO2 95%   Breastfeeding? No     Exam:  AAOx3 NAD  Mood and affect normal  Memory and language intact  Breathing non labored      Assessment:       Cervical spondylosis      Plan:         Alyce Zurita is a 59 y.o. female with cervical facet pain.    Proceed with MBBs as planned.

## 2020-01-08 NOTE — OP NOTE
CERVICAL Medial Branch Block Under Fluoroscopy      Time-out taken to identify patient and procedure side prior to starting the procedure.   I attest that I have reviewed the patient's home medications prior to the procedure and no contraindication have been identified. I re-evaluated the patient after the patient was positioned for the procedure in the procedure room immediately before the procedural time-out. The vital signs are current and represent the current state of the patient which has not significantly changed since the preprocedure assessment.          Date of Service: 01/08/2020    PCP: Carrillo Guzman MD    Referring Physician:                                                           PROCEDURE:  Left C4, C5 and C6 medial branch block    REASON FOR PROCEDURE: Cervical spondylosis [M47.812]    PHYSICIAN: Chaitanya Luna MD  ASSISTANTS: None    MEDICATIONS INJECTED:  preservative free Xylocaine 1%  0.5ml at each level.    LOCAL ANESTHETIC USED:   Xylocaine 2% 1ml per site.    SEDATION MEDICATIONS: None    ESTIMATED BLOOD LOSS:  None.    COMPLICATIONS:  None.    TECHNIQUE:   Laying in a lateral position, the patient was prepped and draped in the usual sterile fashion using ChloraPrep and fenestrated drape.  The level was determined under fluoroscopic guidance.  Local anesthetic was given by going down to the hub of the 27-gauge 1.25in needle and raising a wheel.  A 25-gauge 3.5inch needle was introduced to the anatomic locale of the medial branch at the lateral mass, at the levels mentioned above, utilizing live fluoroscopy. Medication was then injected slowly. The patient tolerated the procedure well.         The patient was monitored after the procedure.  Patient was given post procedure and discharge instructions to follow at home.  We will see the patient back in two weeks or the patient may call to inform of status. The patient was discharged in a stable condition

## 2020-01-08 NOTE — OR NURSING
DISCHARGE INSTRUCTION GIVEN TO  PATIENT AND SPOUSE. UNDERSTANDING VERBALIZED. ALL GOALS MET. PATIENT AMBULATING WITHOUT DISTRESS.

## 2020-01-08 NOTE — H&P (VIEW-ONLY)
Subjective:     Patient ID: Alyce Zurita is a 59 y.o. female    Chief Complaint: Neck pain    Referred by: Chaitanya Luna Jr*      HPI:    Alyce Zurita is a 59 y.o. female who presents today for left C4, C5 and C6 MBBs. She denies any changes in the quality or location of the pain.        Review of Systems   Constitutional: Negative for activity change, appetite change, chills, fatigue, fever and unexpected weight change.   HENT: Negative for hearing loss.    Eyes: Negative for visual disturbance.   Respiratory: Negative for chest tightness and shortness of breath.    Cardiovascular: Negative for chest pain.   Gastrointestinal: Negative for abdominal pain, constipation, diarrhea, nausea and vomiting.   Genitourinary: Negative for difficulty urinating.   Musculoskeletal: Positive for arthralgias, myalgias, neck pain and neck stiffness. Negative for back pain and gait problem.   Skin: Negative for rash.   Neurological: Negative for dizziness, weakness, light-headedness, numbness and headaches.   Psychiatric/Behavioral: Positive for sleep disturbance. Negative for hallucinations and suicidal ideas. The patient is not nervous/anxious.        Past Medical History:   Diagnosis Date    Allergy     Anticoagulant long-term use     Anxiety 11/16/2018    Arthritis     Breast injury     left 10 yrs ago/ hit in chest    Carpal tunnel syndrome of left wrist 9/10/2018    GERD (gastroesophageal reflux disease)     Hyperlipemia     Hypertension, uncontrolled 8/17/2018    Interstitial cystitis     Kidney problem     Meningitis     3 months old    PONV (postoperative nausea and vomiting)     pt needs meds for PONV    Tachycardia        Past Surgical History:   Procedure Laterality Date    APPENDECTOMY      CARDIAC SURGERY      ABLATION    Heart Procedure       HYSTERECTOMY  1993    KIDNEY SURGERY      LEFT HEART CATHETERIZATION Left 2/25/2019    Procedure: Left heart cath 12pm start, R rad access;  Surgeon:  Chaitanya Angela MD;  Location: [facility] CATH LAB;  Service: Cardiology;  Laterality: Left;  RN PREOP 2/20/19  ARRIVAL TIME 10AM    OOPHORECTOMY Bilateral 1993    TEMPOROMANDIBULAR JOINT SURGERY         Social History     Socioeconomic History    Marital status:      Spouse name: Not on file    Number of children: Not on file    Years of education: Not on file    Highest education level: Not on file   Occupational History    Not on file   Social Needs    Financial resource strain: Not on file    Food insecurity:     Worry: Not on file     Inability: Not on file    Transportation needs:     Medical: Not on file     Non-medical: Not on file   Tobacco Use    Smoking status: Never Smoker    Smokeless tobacco: Never Used   Substance and Sexual Activity    Alcohol use: No    Drug use: No    Sexual activity: Yes     Partners: Male     Birth control/protection: None     Comment: 7/20/18  with same partner for 40 years    Lifestyle    Physical activity:     Days per week: Not on file     Minutes per session: Not on file    Stress: Not on file   Relationships    Social connections:     Talks on phone: Not on file     Gets together: Not on file     Attends Latter day service: Not on file     Active member of club or organization: Not on file     Attends meetings of clubs or organizations: Not on file     Relationship status: Not on file   Other Topics Concern    Not on file   Social History Narrative    Not on file       Review of patient's allergies indicates:   Allergen Reactions    Other omega-3s Nausea And Vomiting     chlorine    Rofecoxib Nausea And Vomiting       No current facility-administered medications on file prior to encounter.      Current Outpatient Medications on File Prior to Encounter   Medication Sig Dispense Refill    cholecalciferol, vitamin D3, (VITAMIN D3 ORAL) Take by mouth 2 (two) times daily.      cyclobenzaprine (FLEXERIL) 10 MG tablet Take 10 mg by mouth.       dextromethorphan-guaifenesin  mg (MUCINEX DM)  mg per 12 hr tablet Take 1 tablet by mouth every 12 (twelve) hours.      dicyclomine (BENTYL) 10 MG capsule Take 1 capsule (10 mg total) by mouth 3 (three) times daily as needed. 90 capsule 0    esomeprazole (NEXIUM) 40 MG capsule Take 40 mg by mouth.      FLUoxetine 20 MG capsule Take 1 capsule (20 mg total) by mouth every morning. 90 capsule 1    fluticasone (FLONASE) 50 mcg/actuation nasal spray 2 sprays by Each Nare route once daily.      gabapentin (NEURONTIN) 300 MG capsule Take 2 tablet in the morning and 2 tablet nightly 360 capsule 3    glucosamine/chondr gray A sod (OSTEO BI-FLEX ORAL) Take by mouth 2 (two) times daily.      ibuprofen (ADVIL,MOTRIN) 600 MG tablet Take 600 mg by mouth 3 (three) times daily.      metoprolol tartrate (LOPRESSOR) 25 MG tablet Take 1 tablet (25 mg total) by mouth once daily. 90 tablet 2    montelukast (SINGULAIR) 10 mg tablet Take 1 tablet (10 mg total) by mouth every evening. 90 tablet 2    omeprazole (PRILOSEC) 40 MG capsule Take 1 capsule (40 mg total) by mouth 2 (two) times daily before meals. 180 capsule 1    potassium chloride SA (K-DUR,KLOR-CON) 20 MEQ tablet Take 1 tablet (20 mEq total) by mouth 2 (two) times daily. 90 tablet 1    ranitidine (ZANTAC) 300 MG tablet Take 1 tablet (300 mg total) by mouth 2 (two) times daily. 180 tablet 3    traMADol (ULTRAM) 50 mg tablet Take 50 mg by mouth.      traZODone (DESYREL) 150 MG tablet Take 0.5 tablets (75 mg total) by mouth every evening. 90 tablet 1    aspirin (ECOTRIN) 81 MG EC tablet Take 1 tablet (81 mg total) by mouth once daily. 90 tablet 3    clopidogrel (PLAVIX) 75 mg tablet       fluconazole (DIFLUCAN) 150 MG Tab       nitroGLYCERIN (NITROSTAT) 0.4 MG SL tablet Place 0.4 mg under the tongue.         Objective:      /72 (BP Location: Left arm, Patient Position: Lying)   Pulse 72   Temp 98.3 °F (36.8 °C) (Oral)   Resp 18   LMP  (LMP  Unknown)   SpO2 95%   Breastfeeding? No     Exam:  AAOx3 NAD  Mood and affect normal  Memory and language intact  Breathing non labored      Assessment:       Cervical spondylosis      Plan:         Alyce Zurita is a 59 y.o. female with cervical facet pain.    Proceed with MBBs as planned.

## 2020-01-09 ENCOUNTER — TELEPHONE (OUTPATIENT)
Dept: PAIN MEDICINE | Facility: CLINIC | Age: 60
End: 2020-01-09

## 2020-01-09 DIAGNOSIS — K21.9 GASTROESOPHAGEAL REFLUX DISEASE, ESOPHAGITIS PRESENCE NOT SPECIFIED: ICD-10-CM

## 2020-01-09 DIAGNOSIS — M47.812 CERVICAL SPONDYLOSIS: Primary | ICD-10-CM

## 2020-01-09 RX ORDER — OMEPRAZOLE 40 MG/1
40 CAPSULE, DELAYED RELEASE ORAL
Qty: 180 CAPSULE | Refills: 1 | Status: SHIPPED | OUTPATIENT
Start: 2020-01-09 | End: 2020-07-10

## 2020-01-09 NOTE — TELEPHONE ENCOUNTER
Ms. Mead reports ~80% reduction in pain and ~90% improvement in mobility and ADLs.  She would like to schedule repeat MBB per insurance policy on 1/22/20.  Dr. Luna updated.

## 2020-01-09 NOTE — TELEPHONE ENCOUNTER
Last Office Visit Info:   The patient's last visit with Carrillo Guzman MD was on 10/11/2019.    The patient's last visit in current department was on 7/31/2019.        Last CBC Results:   Lab Results   Component Value Date    WBC 6.45 02/20/2019    HGB 12.1 02/20/2019    HCT 38.9 02/20/2019     (H) 02/20/2019       Last CMP Results  Lab Results   Component Value Date     02/20/2019    K 3.8 02/20/2019     02/20/2019    CO2 24 02/20/2019    BUN 10 02/20/2019    CREATININE 0.8 02/20/2019    CALCIUM 9.1 02/20/2019    ALBUMIN 3.6 11/27/2018    AST 20 11/27/2018    ALT 19 11/27/2018       Last Lipids  Lab Results   Component Value Date    CHOL 231 (H) 08/23/2018    TRIG 140 08/23/2018    HDL 46 08/23/2018    LDLCALC 157.0 08/23/2018       Last A1C  Lab Results   Component Value Date    HGBA1C 5.4 08/23/2018       Last TSH  Lab Results   Component Value Date    TSH 1.319 11/27/2018         Current Med Refills  Medication List with Changes/Refills   Current Medications    ASPIRIN (ECOTRIN) 81 MG EC TABLET    Take 1 tablet (81 mg total) by mouth once daily.       Start Date: 2/4/2019  End Date: --    CHOLECALCIFEROL, VITAMIN D3, (VITAMIN D3 ORAL)    Take by mouth 2 (two) times daily.       Start Date: --        End Date: --    CLOPIDOGREL (PLAVIX) 75 MG TABLET           Start Date: 10/28/2019End Date: --    CYCLOBENZAPRINE (FLEXERIL) 10 MG TABLET    Take 10 mg by mouth.       Start Date: --        End Date: --    DEXTROMETHORPHAN-GUAIFENESIN  MG (MUCINEX DM)  MG PER 12 HR TABLET    Take 1 tablet by mouth every 12 (twelve) hours.       Start Date: --        End Date: --    DICYCLOMINE (BENTYL) 10 MG CAPSULE    Take 1 capsule (10 mg total) by mouth 3 (three) times daily as needed.       Start Date: 10/17/2019End Date: --    ESOMEPRAZOLE (NEXIUM) 40 MG CAPSULE    Take 40 mg by mouth.       Start Date: 10/23/2014End Date: --    FLUCONAZOLE (DIFLUCAN) 150 MG TAB           Start Date:  10/11/2019End Date: --    FLUOXETINE 20 MG CAPSULE    Take 1 capsule (20 mg total) by mouth every morning.       Start Date: 11/11/2019End Date: --    FLUTICASONE (FLONASE) 50 MCG/ACTUATION NASAL SPRAY    2 sprays by Each Nare route once daily.       Start Date: --        End Date: --    GABAPENTIN (NEURONTIN) 300 MG CAPSULE    Take 2 tablet in the morning and 2 tablet nightly       Start Date: 7/12/2019 End Date: --    GLUCOSAMINE/CHONDR QUINTANILLA A SOD (OSTEO BI-FLEX ORAL)    Take by mouth 2 (two) times daily.       Start Date: --        End Date: --    HYDROCHLOROTHIAZIDE (MICROZIDE) 12.5 MG CAPSULE    Take 1 capsule (12.5 mg total) by mouth once daily.       Start Date: 12/23/2019End Date: --    IBUPROFEN (ADVIL,MOTRIN) 600 MG TABLET    Take 600 mg by mouth 3 (three) times daily.       Start Date: --        End Date: --    METOPROLOL TARTRATE (LOPRESSOR) 25 MG TABLET    Take 1 tablet (25 mg total) by mouth once daily.       Start Date: 6/7/2019  End Date: --    MONTELUKAST (SINGULAIR) 10 MG TABLET    Take 1 tablet (10 mg total) by mouth every evening.       Start Date: 7/31/2019 End Date: --    NITROGLYCERIN (NITROSTAT) 0.4 MG SL TABLET    Place 0.4 mg under the tongue.       Start Date: 9/8/2014  End Date: --    OMEPRAZOLE (PRILOSEC) 40 MG CAPSULE    Take 1 capsule (40 mg total) by mouth 2 (two) times daily before meals.       Start Date: 8/20/2019 End Date: 8/19/2020    POTASSIUM CHLORIDE SA (K-DUR,KLOR-CON) 20 MEQ TABLET    Take 1 tablet (20 mEq total) by mouth 2 (two) times daily.       Start Date: 7/22/2019 End Date: --    RANITIDINE (ZANTAC) 300 MG TABLET    Take 1 tablet (300 mg total) by mouth 2 (two) times daily.       Start Date: 7/12/2019 End Date: --    TRAMADOL (ULTRAM) 50 MG TABLET    Take 50 mg by mouth.       Start Date: --        End Date: --    TRAZODONE (DESYREL) 150 MG TABLET    Take 0.5 tablets (75 mg total) by mouth every evening.       Start Date: 7/12/2019 End Date: 7/11/2020       Order(s)  placed per written order guidelines:     Please advise.

## 2020-01-14 ENCOUNTER — TELEPHONE (OUTPATIENT)
Dept: PAIN MEDICINE | Facility: CLINIC | Age: 60
End: 2020-01-14

## 2020-01-14 NOTE — TELEPHONE ENCOUNTER
Reviewed pre-procedure instructions with Ms. Mead, as well as provided arrival time of 1015.  All questions answered- patient verbalized understanding.    Reminded patient that today will be the last doses of ASA and Plavix until after the procedure on 1/22/20- verbalized understanding.

## 2020-01-22 ENCOUNTER — HOSPITAL ENCOUNTER (OUTPATIENT)
Facility: HOSPITAL | Age: 60
Discharge: HOME | End: 2020-01-22
Attending: PAIN MEDICINE | Admitting: PAIN MEDICINE
Payer: COMMERCIAL

## 2020-01-22 VITALS
DIASTOLIC BLOOD PRESSURE: 72 MMHG | SYSTOLIC BLOOD PRESSURE: 151 MMHG | RESPIRATION RATE: 18 BRPM | OXYGEN SATURATION: 96 % | TEMPERATURE: 98 F | HEART RATE: 56 BPM

## 2020-01-22 DIAGNOSIS — M47.812 CERVICAL SPONDYLOSIS: Primary | ICD-10-CM

## 2020-01-22 PROCEDURE — 64491 INJ PARAVERT F JNT C/T 2 LEV: CPT | Mod: LT,,, | Performed by: PAIN MEDICINE

## 2020-01-22 PROCEDURE — 64492 INJ PARAVERT F JNT C/T 3 LEV: CPT | Performed by: PAIN MEDICINE

## 2020-01-22 PROCEDURE — 64491 INJ PARAVERT F JNT C/T 2 LEV: CPT | Performed by: PAIN MEDICINE

## 2020-01-22 PROCEDURE — 25000003 PHARM REV CODE 250: Performed by: PAIN MEDICINE

## 2020-01-22 PROCEDURE — 64490 INJ PARAVERT F JNT C/T 1 LEV: CPT | Performed by: PAIN MEDICINE

## 2020-01-22 PROCEDURE — 64490 PR INJ DX/THER AGNT PARAVERT FACET JOINT,IMG GUIDE,CERV/THORAC, 1ST LEVEL: ICD-10-PCS | Mod: LT,,, | Performed by: PAIN MEDICINE

## 2020-01-22 PROCEDURE — 64490 INJ PARAVERT F JNT C/T 1 LEV: CPT | Mod: LT,,, | Performed by: PAIN MEDICINE

## 2020-01-22 PROCEDURE — 64491 PR INJ DX/THER AGNT PARAVERT FACET JOINT,IMG GUIDE,CERV/THORAC, 2ND LEVEL: ICD-10-PCS | Mod: LT,,, | Performed by: PAIN MEDICINE

## 2020-01-22 RX ORDER — LIDOCAINE HYDROCHLORIDE 10 MG/ML
INJECTION, SOLUTION EPIDURAL; INFILTRATION; INTRACAUDAL; PERINEURAL
Status: DISCONTINUED
Start: 2020-01-22 | End: 2020-01-22 | Stop reason: HOSPADM

## 2020-01-22 RX ORDER — LIDOCAINE HYDROCHLORIDE 20 MG/ML
10 INJECTION, SOLUTION INFILTRATION; PERINEURAL ONCE
Status: COMPLETED | OUTPATIENT
Start: 2020-01-22 | End: 2020-01-22

## 2020-01-22 RX ORDER — LIDOCAINE HYDROCHLORIDE 20 MG/ML
INJECTION, SOLUTION INFILTRATION; PERINEURAL
Status: DISCONTINUED
Start: 2020-01-22 | End: 2020-01-22 | Stop reason: HOSPADM

## 2020-01-22 RX ORDER — LIDOCAINE HYDROCHLORIDE 10 MG/ML
1 INJECTION, SOLUTION EPIDURAL; INFILTRATION; INTRACAUDAL; PERINEURAL ONCE
Status: COMPLETED | OUTPATIENT
Start: 2020-01-22 | End: 2020-01-22

## 2020-01-22 NOTE — OP NOTE
CERVICAL Medial Branch Block Under Fluoroscopy      Time-out taken to identify patient and procedure side prior to starting the procedure.   I attest that I have reviewed the patient's home medications prior to the procedure and no contraindication have been identified. I re-evaluated the patient after the patient was positioned for the procedure in the procedure room immediately before the procedural time-out. The vital signs are current and represent the current state of the patient which has not significantly changed since the preprocedure assessment.          Date of Service: 01/22/2020    PCP: Carrillo Guzman MD    Referring Physician:                                                           PROCEDURE:  Left C4, C5 and C6 medial branch block    REASON FOR PROCEDURE: Cervical spondylosis [M47.812]    PHYSICIAN: Chaitanya Luna MD  ASSISTANTS: None    MEDICATIONS INJECTED:  preservative free Xylocaine 1%  0.5ml at each level.    LOCAL ANESTHETIC USED:   Xylocaine 2% 1ml per site.    SEDATION MEDICATIONS: None    ESTIMATED BLOOD LOSS:  None.    COMPLICATIONS:  None.    TECHNIQUE:   Laying in a lateral position, the patient was prepped and draped in the usual sterile fashion using ChloraPrep and fenestrated drape.  The level was determined under fluoroscopic guidance.  Local anesthetic was given by going down to the hub of the 27-gauge 1.25in needle and raising a wheel.  A 25-gauge 3.5inch needle was introduced to the anatomic locale of the medial branch at the lateral mass, at the levels mentioned above, utilizing live fluoroscopy. Medication was then injected slowly. The patient tolerated the procedure well.         The patient was monitored after the procedure.  Patient was given post procedure and discharge instructions to follow at home.  We will see the patient back in two weeks or the patient may call to inform of status. The patient was discharged in a stable condition

## 2020-01-22 NOTE — DISCHARGE INSTRUCTIONS
Home Care Instructions Pain Management:    1. DIET:   You may resume your normal diet today.   2. BATHING:   You may shower with luke warm water.  3. DRESSING:   You may remove your bandage today.   4. ACTIVITY LEVEL:   You may resume your normal activities 24 hrs after your procedure.  5. MEDICATIONS:   You may resume your normal medications today.   6. SPECIAL INSTRUCTIONS:   No heat to the injection site for 24 hrs including, bath or shower, heating pad, moist heat, or hot tubs.    Use ice pack to injection site for any pain or discomfort.  Apply ice packs for 20 minute intervals as needed.   If you have received any sedatives by mouth today you may not drive for 12 hours.    If you have received any sedation through your IV, you may not drive for 24 hrs.     PLEASE CALL YOUR DOCTOR IF:  1. Redness or swelling around the injection site.  2. Fever of 101 degrees  3. Drainage (pus) from the injection site.  4. For any continuous bleeding (some dried blood over the incision is normal.)    FOR EMERGENCIES:   If any unusual problems or difficulties occur during clinic hours, call [phone] or [phone].     Fall Prevention  Millions of people fall every year and injure themselves. You may have had anesthesia or sedation which may increase your risk of falling. You may have health issues that put you at an increased risk of falling.     Here are ways to reduce your risk of falling.  ·   · Make your home safe by keeping walkways clear of objects you may trip over.  · Use non-slip pads under rugs. Do not use area rugs or small throw rugs.  · Use non-slip mats in bathtubs and showers.  · Install handrails and lights on staircases.  · Do not walk in poorly lit areas.  · Do not stand on chairs or wobbly ladders.  · Use caution when reaching overhead or looking upward. This position can cause a loss of balance.  · Be sure your shoes fit properly, have non-slip bottoms and are in good condition.   · Wear shoes both inside and  out. Avoid going barefoot or wearing slippers.  · Be cautious when going up and down stairs, curbs, and when walking on uneven sidewalks.  · If your balance is poor, consider using a cane or walker.  · If your fall was related to alcohol use, stop or limit alcohol intake.   · If your fall was related to use of sleeping medicines, talk to your doctor about this. You may need to reduce your dosage at bedtime if you awaken during the night to go to the bathroom.    · To reduce the need for nighttime bathroom trips:  ¨ Avoid drinking fluids for several hours before going to bed  ¨ Empty your bladder before going to bed  ¨ Men can keep a urinal at the bedside  · Stay as active as you can. Balance, flexibility, strength, and endurance all come from exercise. They all play a role in preventing falls. Ask your healthcare provider which types of activity are right for you.  · Get your vision checked on a regular basis.  · If you have pets, know where they are before you stand up or walk so you don't trip over them.  · Use night lights.

## 2020-01-22 NOTE — PLAN OF CARE
Pt discharged with spouse . In no acute distress. Gait steady. Verbalizes understanding of post procedure instructions.

## 2020-01-23 ENCOUNTER — TELEPHONE (OUTPATIENT)
Dept: PAIN MEDICINE | Facility: CLINIC | Age: 60
End: 2020-01-23

## 2020-01-23 DIAGNOSIS — M47.812 CERVICAL SPONDYLOSIS: Primary | ICD-10-CM

## 2020-01-23 NOTE — TELEPHONE ENCOUNTER
Ms. Mead reports 80-90% pain relief following the MBB performed yesterday, as well as near 100% improvement in mobility.  Her pain has since returned to normal.  She would like to schedule the RFA on 2/12/20- Dr. Luna updated.  Instructed her to resume ASA and Plavix if she has not done so already- Ms. Mead verbalized understanding.

## 2020-02-04 ENCOUNTER — TELEPHONE (OUTPATIENT)
Dept: PAIN MEDICINE | Facility: CLINIC | Age: 60
End: 2020-02-04

## 2020-02-04 NOTE — TELEPHONE ENCOUNTER
Reviewed pre-procedure instructions with Ms. Mead, as well as provided arrival time of 0730.  All questions answered- patient verbalized understanding.    Reminded her that today will be the last doses of ASA and Plavix until after the procedure- verbalized understanding.

## 2020-02-11 ENCOUNTER — TELEPHONE (OUTPATIENT)
Dept: PAIN MEDICINE | Facility: CLINIC | Age: 60
End: 2020-02-11

## 2020-02-11 NOTE — TELEPHONE ENCOUNTER
----- Message from Nilay Amezcua sent at 2/11/2020 10:21 AM CST -----  Contact: Pt      The Pt states that she received a letter from her insurance company about more information needed to be able to approv her surgery.  Please contact the Pt to confirm that everything is still on for the surgery.    Phone # [phone]

## 2020-02-12 ENCOUNTER — HOSPITAL ENCOUNTER (OUTPATIENT)
Facility: HOSPITAL | Age: 60
Discharge: HOME | End: 2020-02-12
Attending: PAIN MEDICINE | Admitting: PAIN MEDICINE
Payer: COMMERCIAL

## 2020-02-12 VITALS
HEART RATE: 64 BPM | SYSTOLIC BLOOD PRESSURE: 131 MMHG | RESPIRATION RATE: 20 BRPM | DIASTOLIC BLOOD PRESSURE: 63 MMHG | TEMPERATURE: 98 F | OXYGEN SATURATION: 96 %

## 2020-02-12 DIAGNOSIS — M47.812 CERVICAL SPONDYLOSIS: Primary | ICD-10-CM

## 2020-02-12 PROCEDURE — 64634 PR DESTROY C/TH FACET JNT ADDL: ICD-10-PCS | Mod: LT,,, | Performed by: PAIN MEDICINE

## 2020-02-12 PROCEDURE — 99152 PR MOD CONSCIOUS SEDATION, SAME PHYS, 5+ YRS, FIRST 15 MIN: ICD-10-PCS | Mod: ,,, | Performed by: PAIN MEDICINE

## 2020-02-12 PROCEDURE — 64634 DESTROY C/TH FACET JNT ADDL: CPT | Mod: LT,,, | Performed by: PAIN MEDICINE

## 2020-02-12 PROCEDURE — 63600175 PHARM REV CODE 636 W HCPCS: Performed by: PAIN MEDICINE

## 2020-02-12 PROCEDURE — 64633 PR DESTROY CERV/THOR FACET JNT: ICD-10-PCS | Mod: LT,,, | Performed by: PAIN MEDICINE

## 2020-02-12 PROCEDURE — 64633 DESTROY CERV/THOR FACET JNT: CPT | Mod: LT,,, | Performed by: PAIN MEDICINE

## 2020-02-12 PROCEDURE — 64633 DESTROY CERV/THOR FACET JNT: CPT | Performed by: PAIN MEDICINE

## 2020-02-12 PROCEDURE — 99152 MOD SED SAME PHYS/QHP 5/>YRS: CPT | Performed by: PAIN MEDICINE

## 2020-02-12 PROCEDURE — 99152 MOD SED SAME PHYS/QHP 5/>YRS: CPT | Mod: ,,, | Performed by: PAIN MEDICINE

## 2020-02-12 PROCEDURE — 64634 DESTROY C/TH FACET JNT ADDL: CPT | Performed by: PAIN MEDICINE

## 2020-02-12 PROCEDURE — 25000003 PHARM REV CODE 250: Performed by: PAIN MEDICINE

## 2020-02-12 PROCEDURE — 99153 MOD SED SAME PHYS/QHP EA: CPT | Performed by: PAIN MEDICINE

## 2020-02-12 RX ORDER — MIDAZOLAM HYDROCHLORIDE 1 MG/ML
5 INJECTION INTRAMUSCULAR; INTRAVENOUS
Status: DISCONTINUED | OUTPATIENT
Start: 2020-02-12 | End: 2020-02-12 | Stop reason: HOSPADM

## 2020-02-12 RX ORDER — FENTANYL CITRATE 50 UG/ML
INJECTION, SOLUTION INTRAMUSCULAR; INTRAVENOUS
Status: DISCONTINUED
Start: 2020-02-12 | End: 2020-02-12 | Stop reason: HOSPADM

## 2020-02-12 RX ORDER — ONDANSETRON 2 MG/ML
INJECTION INTRAMUSCULAR; INTRAVENOUS
Status: DISCONTINUED
Start: 2020-02-12 | End: 2020-02-12 | Stop reason: HOSPADM

## 2020-02-12 RX ORDER — ONDANSETRON 2 MG/ML
4 INJECTION INTRAMUSCULAR; INTRAVENOUS ONCE
Status: COMPLETED | OUTPATIENT
Start: 2020-02-12 | End: 2020-02-12

## 2020-02-12 RX ORDER — LIDOCAINE HYDROCHLORIDE 20 MG/ML
10 INJECTION, SOLUTION INFILTRATION; PERINEURAL ONCE
Status: COMPLETED | OUTPATIENT
Start: 2020-02-12 | End: 2020-02-12

## 2020-02-12 RX ORDER — MIDAZOLAM HYDROCHLORIDE 1 MG/ML
INJECTION INTRAMUSCULAR; INTRAVENOUS
Status: DISCONTINUED
Start: 2020-02-12 | End: 2020-02-12 | Stop reason: HOSPADM

## 2020-02-12 RX ORDER — LIDOCAINE HYDROCHLORIDE 20 MG/ML
INJECTION, SOLUTION INFILTRATION; PERINEURAL
Status: DISCONTINUED
Start: 2020-02-12 | End: 2020-02-12 | Stop reason: HOSPADM

## 2020-02-12 RX ORDER — LIDOCAINE HYDROCHLORIDE 10 MG/ML
INJECTION, SOLUTION EPIDURAL; INFILTRATION; INTRACAUDAL; PERINEURAL
Status: DISCONTINUED
Start: 2020-02-12 | End: 2020-02-12 | Stop reason: HOSPADM

## 2020-02-12 RX ORDER — SODIUM CHLORIDE 9 MG/ML
INJECTION, SOLUTION INTRAVENOUS CONTINUOUS
Status: DISCONTINUED | OUTPATIENT
Start: 2020-02-12 | End: 2020-02-12 | Stop reason: HOSPADM

## 2020-02-12 RX ORDER — LIDOCAINE HYDROCHLORIDE 10 MG/ML
1 INJECTION, SOLUTION EPIDURAL; INFILTRATION; INTRACAUDAL; PERINEURAL ONCE
Status: COMPLETED | OUTPATIENT
Start: 2020-02-12 | End: 2020-02-12

## 2020-02-12 RX ORDER — DEXAMETHASONE SODIUM PHOSPHATE 10 MG/ML
INJECTION INTRAMUSCULAR; INTRAVENOUS
Status: DISCONTINUED
Start: 2020-02-12 | End: 2020-02-12 | Stop reason: HOSPADM

## 2020-02-12 RX ORDER — DEXAMETHASONE SODIUM PHOSPHATE 10 MG/ML
10 INJECTION INTRAMUSCULAR; INTRAVENOUS ONCE
Status: COMPLETED | OUTPATIENT
Start: 2020-02-12 | End: 2020-02-12

## 2020-02-12 RX ORDER — FENTANYL CITRATE 50 UG/ML
100 INJECTION, SOLUTION INTRAMUSCULAR; INTRAVENOUS
Status: DISCONTINUED | OUTPATIENT
Start: 2020-02-12 | End: 2020-02-12 | Stop reason: HOSPADM

## 2020-02-12 NOTE — OP NOTE
Date:@     Procedure: Cervical Cooled Radiofreqiency Ablation: left C4, C5 and C6     Pre-op diagnosis: Facet arthritis of cervical region [M46.92]  Osteoarthritis of cervical spine, unspecified spinal osteoarthritis complication status [M47.812]     Post-op diagnosis: Facet arthritis of cervical region [M46.92]  Osteoarthritis of cervical spine, unspecified spinal osteoarthritis complication status [M47.812]      Physician: Dr. Chaitanya Luna      Assistant: none     Anesthestia: local/IV sedation:  Versed 1 mg and fentanyl 75 mcg IV.  Conscious sedation provided by MD and monitored by RN.   (See nurse documentation and case log for sedation time)     All medications, allergies, and relevant histories were reviewed. No recent antibiotics or infections.  A time-out was taken to verify the correct patient, procedure, laterality, and appropriate medications/allergies.          The procedure risks, benefits, and possible complications were discussed with the patient including nerve damage, infection, spinal headache, and paresis. pulse rate, and O2 per nasal cannula at 3L/min. were monitored throughout the procedure.       Patient was placed in the right lateral decubitus position. Skin was prepped with CHG and draped. Lateral view of the spine was obtained with fluoroscopy. Entry sites were marked over the skin and Xylocaine 2% was used to anesthetize the skin and subcutaneous tissues. A 17-gauge 50 mm  Cooled RF needle with 2 mm active tip was introduced at coaxially, and the needle was placed onto the lateral aspect of the mid articular pilar at each level, as mentioned above.   Placement was confirmed with a fluoroscopic view.       No motor stimulation was elicited at any level at 2V with a frequency of 2Hz. After negative aspiration, 1cc of 2% lidocaine was injected at each level. Thermal RF was then conducted at each level at 80 degrees for 2:30 minutes. 0.5 cc of a mixture of 1% lidocaine 5mg dexamethasone PF  was injected at each level, as mentioned above. Patient tolerated the procedure well and there were no complications.     Future Management:   If helpful, can repeat as needed.

## 2020-02-12 NOTE — DISCHARGE INSTRUCTIONS
Home Care Instructions Pain Management:    1. DIET:   You may resume your normal diet today.   2. BATHING:   You may shower with luke warm water.  3. DRESSING:   You may remove your bandage today.   4. ACTIVITY LEVEL:   You may resume your normal activities 24 hrs after your procedure.  5. MEDICATIONS:   You may resume your normal medications today.   6. SPECIAL INSTRUCTIONS:   No heat to the injection site for 24 hrs including, bath or shower, heating pad, moist heat, or hot tubs.    Use ice pack to injection site for any pain or discomfort.  Apply ice packs for 20 minute intervals as needed.   If you have received any sedatives by mouth today you may not drive for 12 hours.    If you have received any sedation through your IV, you may not drive for 24 hrs.     PLEASE CALL YOUR DOCTOR IF:  1. Redness or swelling around the injection site.  2. Fever of 101 degrees  3. Drainage (pus) from the injection site.  4. For any continuous bleeding (some dried blood over the incision is normal.)    FOR EMERGENCIES:   If any unusual problems or difficulties occur during clinic hours, call [phone] or [phone].   Fall Prevention  Millions of people fall every year and injure themselves. You may have had anesthesia or sedation which may increase your risk of falling. You may have health issues that put you at an increased risk of falling.     Here are ways to reduce your risk of falling.  ·   · Make your home safe by keeping walkways clear of objects you may trip over.  · Use non-slip pads under rugs. Do not use area rugs or small throw rugs.  · Use non-slip mats in bathtubs and showers.  · Install handrails and lights on staircases.  · Do not walk in poorly lit areas.  · Do not stand on chairs or wobbly ladders.  · Use caution when reaching overhead or looking upward. This position can cause a loss of balance.  · Be sure your shoes fit properly, have non-slip bottoms and are in good condition.   · Wear shoes both inside and  out. Avoid going barefoot or wearing slippers.  · Be cautious when going up and down stairs, curbs, and when walking on uneven sidewalks.  · If your balance is poor, consider using a cane or walker.  · If your fall was related to alcohol use, stop or limit alcohol intake.   · If your fall was related to use of sleeping medicines, talk to your doctor about this. You may need to reduce your dosage at bedtime if you awaken during the night to go to the bathroom.    · To reduce the need for nighttime bathroom trips:  ¨ Avoid drinking fluids for several hours before going to bed  ¨ Empty your bladder before going to bed  ¨ Men can keep a urinal at the bedside  · Stay as active as you can. Balance, flexibility, strength, and endurance all come from exercise. They all play a role in preventing falls. Ask your healthcare provider which types of activity are right for you.  · Get your vision checked on a regular basis.  · If you have pets, know where they are before you stand up or walk so you don't trip over them.  Use night lights.Medial Branch Neurotomy  Back or neck pain may be due to problems with certain nerves near your spine. If so, a medial branch neurotomy can help relieve your pain.  In some cases, your doctor may give you a nerve block to predict your response to neurotomy. The treatment uses heat, cold, radiofrequency, or chemicals to destroy the nerves near a problem joint. This keeps some pain messages from traveling to your brain, and helps relieve your symptoms.    Medial branch nerves  Each vertebra in your spine has facets (flat surfaces). They touch where the vertebrae fit together. This forms a facet joint. Each facet joint has at least two medial branch nerves. They are part of the nerve pathway to and from each facet joint. A facet joint in your back or neck can become inflamed (swollen and irritated). Pain messages may then travel along the nerve pathway from the facet joint to your brain.    Blocking  pain messages  Medial branch nerves in each facet joint send and carry messages about back or neck pain. Destroying a few of these nerves can keep certain pain messages from reaching your brain. This can help bring you relief. The relief typically lasts for months to years.  Risks and complications  Risks and complications are rare, but can include:  Infection  Increased pain, numbness, or weakness  Nerve damage  Bleeding  Failure to relieve pain    © 8152-2893 [employer]. [address] [place] [address], [place] [address]. All rights reserved. This information is not intended as a substitute for professional medical care. Always follow your healthcare professional's instructions.      Medial Branch Neurotomy: Your Experience  Back or neck pain may be due to problems with certain nerves near your spine. If so, a medial branch neurotomy can help relieve your pain. The treatment uses heat, cold, radiofrequency, or chemicals to destroy the nerves near a problem joint. This keeps some pain messages from traveling to your brain, and helps relieve your symptoms.  The treatment is done in a hospital or outpatient department. Your healthcare provider will ask you to sign a consent form. He or she will examine you and may give you an IV (intravenous) line for fluids and medicines.      Relax at home for the rest of the day after your treatment, even if you feel good.    Getting ready for your treatment  Ask your healthcare provider whether you should stop taking any medicines before treatment.  Tell your provider if you are pregnant or allergic to any medicines.  You may be asked to stop eating or drinking for several hours before you check in for your treatment.  During the procedure  You will lie on an exam table, most likely on your stomach depending on where the problem joint is.  Your healthcare provider will clean the skin over the treatment site and then numb it with medicine.  Your provider uses X-ray imaging  (fluoroscopy) to help see your spine and guide the treatment. Your provider may inject a contrast dye into the affected region to help get a better image. If you are allergic to iodine or had a reaction to a dye, tell your healthcare provider.  Your healthcare provider uses heat, cold, or chemicals to destroy part of the nerve near the inflamed facet joint. Nearby nerves may also be treated.  After the procedure  Most often, you can go home shortly after the procedure, generally in about an hour. Have an adult friend or relative drive you. The treated spot may be swollen and may feel more sore than usual. This is normal and may last for a day or so. It will be a few days before you feel relief from your symptoms. Your provider may prescribe pain medicines for you during that time. Ask him or her when its OK for you to go back to work.  Call your healthcare provider if you have a fever over 100.4°F (38°C), chills, or redness or drainage at the treatment site.    © 5321-3675 [employer]. [address] [place] [address], [place] [address]. All rights reserved. This information is not intended as a substitute for professional medical care. Always follow your healthcare professional's instructions.            ·

## 2020-02-12 NOTE — OR NURSING
PROCEDURE AND RECOVERY COMPLETED. DISCHARGE INSTRUCTIONS GIVEN TO PATIENT AND SPOUSE.  BAND AID ADJUSTED TO LEFT NECK AREA. VERBALIZED AN UNDERSTANDING INSTRUCTIONS.

## 2020-02-12 NOTE — H&P
Subjective:     Patient ID: Alyce Zurita is a 59 y.o. female    Chief Complaint: neck pain    Referred by: Chaitanya Luna      HPI:    Alyce Zurita is a 59 y.o. female who presents today for left C4, C5 and C6 RFA. She denies any changes in the quality or location of the pain.        Review of Systems   Constitutional: Negative for activity change, appetite change, chills, fatigue, fever and unexpected weight change.   HENT: Negative for hearing loss.    Eyes: Negative for visual disturbance.   Respiratory: Negative for chest tightness and shortness of breath.    Cardiovascular: Negative for chest pain.   Gastrointestinal: Negative for abdominal pain, constipation, diarrhea, nausea and vomiting.   Genitourinary: Negative for difficulty urinating.   Musculoskeletal: Positive for myalgias, neck pain and neck stiffness. Negative for back pain and gait problem.   Skin: Negative for rash.   Neurological: Negative for dizziness, weakness, light-headedness, numbness and headaches.   Psychiatric/Behavioral: Positive for sleep disturbance. Negative for hallucinations and suicidal ideas. The patient is not nervous/anxious.        Past Medical History:   Diagnosis Date    Allergy     Anticoagulant long-term use     Anxiety 11/16/2018    Arthritis     Breast injury     left 10 yrs ago/ hit in chest    Carpal tunnel syndrome of left wrist 9/10/2018    GERD (gastroesophageal reflux disease)     Hyperlipemia     Hypertension, uncontrolled 8/17/2018    Interstitial cystitis     Kidney problem     Meningitis     3 months old    PONV (postoperative nausea and vomiting)     pt needs meds for PONV    Tachycardia        Past Surgical History:   Procedure Laterality Date    APPENDECTOMY      CARDIAC SURGERY      ABLATION    EPIDURAL STEROID INJECTION Left 1/8/2020    Procedure: Cervical Medial Branch Blocks;  Surgeon: Chaitanya Luna Jr., MD;  Location: [facility];  Service: Pain Management;  Laterality:  Left;  Left C4, C5, C6 MBB    Arrive @ 0945; ASA and Plavix last 12/31; No DM; NO Sedation    EPIDURAL STEROID INJECTION Left 1/22/2020    Procedure: Cervical Medial Branch Blocks;  Surgeon: Chaitanya Luna Jr., MD;  Location: [facility] ENDO;  Service: Pain Management;  Laterality: Left;  Left C4-6 MBB    Arrive @ 1015; NO Sedation; ASA/Plavix last 1/14; No DM    Heart Procedure       HYSTERECTOMY  1993    KIDNEY SURGERY      LEFT HEART CATHETERIZATION Left 2/25/2019    Procedure: Left heart cath 12pm start, R rad access;  Surgeon: Chaitanya Angela MD;  Location: [facility] CATH LAB;  Service: Cardiology;  Laterality: Left;  RN PREOP 2/20/19  ARRIVAL TIME 10AM    OOPHORECTOMY Bilateral 1993    TEMPOROMANDIBULAR JOINT SURGERY         Social History     Socioeconomic History    Marital status:      Spouse name: Not on file    Number of children: Not on file    Years of education: Not on file    Highest education level: Not on file   Occupational History    Not on file   Social Needs    Financial resource strain: Not on file    Food insecurity:     Worry: Not on file     Inability: Not on file    Transportation needs:     Medical: Not on file     Non-medical: Not on file   Tobacco Use    Smoking status: Never Smoker    Smokeless tobacco: Never Used   Substance and Sexual Activity    Alcohol use: No    Drug use: No    Sexual activity: Yes     Partners: Male     Birth control/protection: None     Comment: 7/20/18  with same partner for 40 years    Lifestyle    Physical activity:     Days per week: Not on file     Minutes per session: Not on file    Stress: Not on file   Relationships    Social connections:     Talks on phone: Not on file     Gets together: Not on file     Attends [employer] service: Not on file     Active member of club or organization: Not on file     Attends meetings of clubs or organizations: Not on file     Relationship status: Not on file   Other Topics Concern     Not on file   Social History Narrative    Not on file       Review of patient's allergies indicates:   Allergen Reactions    Other omega-3s Nausea And Vomiting     chlorine    Rofecoxib Nausea And Vomiting       No current facility-administered medications on file prior to encounter.      Current Outpatient Medications on File Prior to Encounter   Medication Sig Dispense Refill    aspirin (ECOTRIN) 81 MG EC tablet Take 1 tablet (81 mg total) by mouth once daily. 90 tablet 3    cholecalciferol, vitamin D3, (VITAMIN D3 ORAL) Take by mouth 2 (two) times daily.      clopidogrel (PLAVIX) 75 mg tablet       cyclobenzaprine (FLEXERIL) 10 MG tablet Take 10 mg by mouth.      dextromethorphan-guaifenesin  mg (MUCINEX DM)  mg per 12 hr tablet Take 1 tablet by mouth every 12 (twelve) hours.      dicyclomine (BENTYL) 10 MG capsule Take 1 capsule (10 mg total) by mouth 3 (three) times daily as needed. 90 capsule 0    fluconazole (DIFLUCAN) 150 MG Tab       FLUoxetine 20 MG capsule Take 1 capsule (20 mg total) by mouth every morning. 90 capsule 1    fluticasone (FLONASE) 50 mcg/actuation nasal spray 2 sprays by Each Nare route once daily.      gabapentin (NEURONTIN) 300 MG capsule Take 2 tablet in the morning and 2 tablet nightly 360 capsule 3    glucosamine/chondr gray A sod (OSTEO BI-FLEX ORAL) Take by mouth 2 (two) times daily.      hydroCHLOROthiazide (MICROZIDE) 12.5 mg capsule Take 1 capsule (12.5 mg total) by mouth once daily. 90 capsule 1    ibuprofen (ADVIL,MOTRIN) 600 MG tablet Take 600 mg by mouth 3 (three) times daily.      metoprolol tartrate (LOPRESSOR) 25 MG tablet Take 1 tablet (25 mg total) by mouth once daily. 90 tablet 2    montelukast (SINGULAIR) 10 mg tablet Take 1 tablet (10 mg total) by mouth every evening. 90 tablet 2    nitroGLYCERIN (NITROSTAT) 0.4 MG SL tablet Place 0.4 mg under the tongue.      omeprazole (PRILOSEC) 40 MG capsule Take 1 capsule (40 mg total) by mouth 2  (two) times daily before meals. 180 capsule 1    potassium chloride SA (K-DUR,KLOR-CON) 20 MEQ tablet Take 1 tablet (20 mEq total) by mouth 2 (two) times daily. 90 tablet 1    ranitidine (ZANTAC) 300 MG tablet Take 1 tablet (300 mg total) by mouth 2 (two) times daily. 180 tablet 3    traMADol (ULTRAM) 50 mg tablet Take 50 mg by mouth.      traZODone (DESYREL) 150 MG tablet Take 0.5 tablets (75 mg total) by mouth every evening. 90 tablet 1       Objective:      LMP  (LMP Unknown)     Exam:  AAOx3 NAD  Mood and affect normal  Memory and language intact  RRR  CTAB        Assessment:       Cervical spondylosis      Plan:         Alyce Zurita is a 59 y.o. female with cervical facet pain.    Proceed with RFA as planned.

## 2020-02-27 NOTE — PROGRESS NOTES
Subjective:       Patient ID: Alyce Zurita is a 59 y.o.  female pt.    Chief Complaint: Dizziness and Headache      Former pt from Dr. Davila,  here today for Urgent Care visit due to above. Seen last by Dr. Guzman on 10/11/2019.    HPI    She comes today with complaint mainly of dizziness that started 4 days ago.  It is worse when lying down and closing her eyes. Feels that the room is spinning, cannot tell in which direction. It is not the first time. She does not respond to meclizine.  She never saw an ENT specialist, but she is sure she has fluid behind her TMs that explains the issue.  She states that her daughter has the same issue, that was treated by a chiropractor.  She also complains of recurrent sore throat, and enlarged cervical lymph nodes?  She uses a saline foam to help for nasal congestion.  She had a procedure done re: cervical issue that went very well, very pleased of the results.  She is due for a mammography.      Patient Active Problem List   Diagnosis    Chronic neck pain    Primary insomnia    Obesity (BMI 30.0-34.9)    Carpal tunnel syndrome of left wrist    Wrist pain, chronic, left    Anxiety    Coronary artery disease involving native coronary artery of native heart without angina pectoris    Abnormal nuclear stress test    Chronic sinusitis    Cervical spondylosis    DDD (degenerative disc disease), cervical    Myofascial pain    Chronic pain of left knee     Review of Systems   Constitutional: Negative.  Negative for fatigue and fever.   HENT: Positive for congestion, postnasal drip and sore throat. Negative for sinus pressure.         Lymph node swelling   Cardiovascular: Negative for chest pain, palpitations and leg swelling.   Musculoskeletal: Positive for neck pain.        Anterior leg/shin pain   Neurological: Positive for dizziness.   Hematological: Positive for adenopathy.   Psychiatric/Behavioral: Negative for dysphoric mood. The patient is not  "nervous/anxious.        Objective:      Physical Exam   Constitutional: She appears well-developed and well-nourished.   HENT:   Right Ear: External ear normal.   Left Ear: External ear normal.   Nose: Nose normal.   Mouth/Throat: Oropharynx is clear and moist.   Eyes: Pupils are equal, round, and reactive to light. Conjunctivae and EOM are normal.   Cardiovascular: Normal rate, regular rhythm, normal heart sounds and intact distal pulses.   Pulmonary/Chest: Effort normal and breath sounds normal.   Lymphadenopathy:     She has no cervical adenopathy.   Neurological: No cranial nerve deficit or sensory deficit.   No nystagmus, Romberg negative, difficulties to walk on a line   Skin: Skin is warm and dry.   Psychiatric: She has a normal mood and affect. Her behavior is normal. Judgment and thought content normal.       Vitals:    02/28/20 1444   BP: 138/72   BP Location: Right arm   Patient Position: Sitting   BP Method: Large (Manual)   Pulse: 74   Resp: 17   Temp: 98.4 °F (36.9 °C)   TempSrc: Oral   SpO2: 96%   Weight: 89.3 kg (196 lb 13.9 oz)   Height: 5' 5" (1.651 m)     Body mass index is 32.76 kg/m².    RESULTS: Reviewed labs from last 6 months.    Assessment:       1. Dizziness    2. Essential hypertension    3. Non-seasonal allergic rhinitis due to other allergic trigger    4. Encounter for screening mammogram for breast cancer        Plan:   Alyce was seen today for dizziness and headache.    Diagnoses and all orders for this visit:    Dizziness  -     Ambulatory referral/consult to ENT; Future         -     meclizine (ANTIVERT) 25 mg tablet; Take 1 tablet (25 mg total) by mouth 3 (three) times daily as needed.  See HPI. PE benign. Recurrent issue. Vertigo? Meclizine not helping but will refill anyway. I would like for her to be assessed re: vertigo by ENT. Referral placed.     Essential hypertension    At goal, not explaining present symptoms.    Non-seasonal allergic rhinitis due to other allergic " trigger    Pt takes correct treatment for this.    Encounter for screening mammogram for breast cancer  -     Mammo Digital Screening Bilat; Future    Order provided. Overdue.    Follow up in about 3 months (around 5/28/2020).

## 2020-02-28 ENCOUNTER — TELEPHONE (OUTPATIENT)
Dept: PAIN MEDICINE | Facility: CLINIC | Age: 60
End: 2020-02-28

## 2020-02-28 ENCOUNTER — TELEPHONE (OUTPATIENT)
Dept: FAMILY MEDICINE | Facility: CLINIC | Age: 60
End: 2020-02-28

## 2020-02-28 ENCOUNTER — OFFICE VISIT (OUTPATIENT)
Dept: FAMILY MEDICINE | Facility: CLINIC | Age: 60
End: 2020-02-28
Payer: COMMERCIAL

## 2020-02-28 VITALS
HEART RATE: 74 BPM | RESPIRATION RATE: 17 BRPM | SYSTOLIC BLOOD PRESSURE: 138 MMHG | BODY MASS INDEX: 32.8 KG/M2 | HEIGHT: 65 IN | TEMPERATURE: 98 F | DIASTOLIC BLOOD PRESSURE: 72 MMHG | WEIGHT: 196.88 LBS | OXYGEN SATURATION: 96 %

## 2020-02-28 DIAGNOSIS — J30.89 NON-SEASONAL ALLERGIC RHINITIS DUE TO OTHER ALLERGIC TRIGGER: ICD-10-CM

## 2020-02-28 DIAGNOSIS — I10 ESSENTIAL HYPERTENSION: ICD-10-CM

## 2020-02-28 DIAGNOSIS — R42 DIZZINESS: Primary | ICD-10-CM

## 2020-02-28 DIAGNOSIS — R10.9 FUNCTIONAL ABDOMINAL PAIN SYNDROME: ICD-10-CM

## 2020-02-28 DIAGNOSIS — Z12.31 ENCOUNTER FOR SCREENING MAMMOGRAM FOR BREAST CANCER: ICD-10-CM

## 2020-02-28 PROCEDURE — 99213 PR OFFICE/OUTPT VISIT, EST, LEVL III, 20-29 MIN: ICD-10-PCS | Mod: S$GLB,,, | Performed by: INTERNAL MEDICINE

## 2020-02-28 PROCEDURE — 3078F DIAST BP <80 MM HG: CPT | Mod: CPTII,S$GLB,, | Performed by: INTERNAL MEDICINE

## 2020-02-28 PROCEDURE — 99999 PR PBB SHADOW E&M-EST. PATIENT-LVL V: ICD-10-PCS | Mod: PBBFAC,,, | Performed by: INTERNAL MEDICINE

## 2020-02-28 PROCEDURE — 99213 OFFICE O/P EST LOW 20 MIN: CPT | Mod: S$GLB,,, | Performed by: INTERNAL MEDICINE

## 2020-02-28 PROCEDURE — 3078F PR MOST RECENT DIASTOLIC BLOOD PRESSURE < 80 MM HG: ICD-10-PCS | Mod: CPTII,S$GLB,, | Performed by: INTERNAL MEDICINE

## 2020-02-28 PROCEDURE — 3075F PR MOST RECENT SYSTOLIC BLOOD PRESS GE 130-139MM HG: ICD-10-PCS | Mod: CPTII,S$GLB,, | Performed by: INTERNAL MEDICINE

## 2020-02-28 PROCEDURE — 3075F SYST BP GE 130 - 139MM HG: CPT | Mod: CPTII,S$GLB,, | Performed by: INTERNAL MEDICINE

## 2020-02-28 PROCEDURE — 3008F PR BODY MASS INDEX (BMI) DOCUMENTED: ICD-10-PCS | Mod: CPTII,S$GLB,, | Performed by: INTERNAL MEDICINE

## 2020-02-28 PROCEDURE — 3008F BODY MASS INDEX DOCD: CPT | Mod: CPTII,S$GLB,, | Performed by: INTERNAL MEDICINE

## 2020-02-28 PROCEDURE — 99999 PR PBB SHADOW E&M-EST. PATIENT-LVL V: CPT | Mod: PBBFAC,,, | Performed by: INTERNAL MEDICINE

## 2020-02-28 RX ORDER — MECLIZINE HYDROCHLORIDE 25 MG/1
25 TABLET ORAL 3 TIMES DAILY PRN
Qty: 90 TABLET | Refills: 0 | Status: SHIPPED | OUTPATIENT
Start: 2020-02-28

## 2020-02-28 RX ORDER — TIZANIDINE 4 MG/1
TABLET ORAL
COMMUNITY
Start: 2020-01-06 | End: 2020-03-05 | Stop reason: SDUPTHER

## 2020-02-28 RX ORDER — TRAZODONE HYDROCHLORIDE 50 MG/1
TABLET ORAL
COMMUNITY
Start: 2020-02-12 | End: 2020-03-29 | Stop reason: SDUPTHER

## 2020-02-28 NOTE — TELEPHONE ENCOUNTER
Ms. Mead reports dizziness and HA starting 2/18/2020, S/P Left C4-6 RFA 2/12/2020.  Denies falling or LOC.  Dr. Luna updated.  She is sto f/u with Primary care as planned today and will see us on 3/6/2020.  Patient verbalized understanding.

## 2020-03-04 ENCOUNTER — TELEPHONE (OUTPATIENT)
Dept: FAMILY MEDICINE | Facility: CLINIC | Age: 60
End: 2020-03-04

## 2020-03-04 NOTE — TELEPHONE ENCOUNTER
I spoke with the patient regarding a referral to ENT, she refuse to schedule without an explanation.

## 2020-03-05 DIAGNOSIS — R10.9 FUNCTIONAL ABDOMINAL PAIN SYNDROME: ICD-10-CM

## 2020-03-05 RX ORDER — TIZANIDINE 4 MG/1
4 TABLET ORAL 3 TIMES DAILY PRN
Qty: 60 TABLET | Refills: 0 | Status: SHIPPED | OUTPATIENT
Start: 2020-03-05 | End: 2020-03-16 | Stop reason: SDUPTHER

## 2020-03-05 RX ORDER — DICYCLOMINE HYDROCHLORIDE 10 MG/1
10 CAPSULE ORAL 3 TIMES DAILY PRN
Qty: 90 CAPSULE | Refills: 0 | Status: SHIPPED | OUTPATIENT
Start: 2020-03-05 | End: 2020-03-24

## 2020-03-05 NOTE — TELEPHONE ENCOUNTER
----- Message from Kady Santos sent at 3/5/2020  1:24 PM CST -----  Contact: Self  Type: Patient Call Back    Who called: Self    What is the request in detail: Refill: tiZANidine (ZANAFLEX) 4 MG tablet  dicyclomine (BENTYL) 20 MG capsule    [id number] - [place], [place] - [address] [place] [address]  Phone: [phone] Fax: [phone]    Can the clinic reply by [facility]? No    Would the patient rather a call back or a response via [facility]? Call    Best call back number:[phone]    Additional Information:n/a

## 2020-03-06 ENCOUNTER — OFFICE VISIT (OUTPATIENT)
Dept: PAIN MEDICINE | Facility: CLINIC | Age: 60
End: 2020-03-06
Payer: COMMERCIAL

## 2020-03-06 ENCOUNTER — HOSPITAL ENCOUNTER (OUTPATIENT)
Dept: RADIOLOGY | Facility: HOSPITAL | Age: 60
Discharge: HOME | End: 2020-03-06
Attending: INTERNAL MEDICINE
Payer: COMMERCIAL

## 2020-03-06 VITALS — BODY MASS INDEX: 32.8 KG/M2 | HEIGHT: 65 IN | WEIGHT: 196.88 LBS

## 2020-03-06 VITALS
BODY MASS INDEX: 33.07 KG/M2 | TEMPERATURE: 98 F | HEIGHT: 65 IN | DIASTOLIC BLOOD PRESSURE: 76 MMHG | HEART RATE: 63 BPM | WEIGHT: 198.5 LBS | SYSTOLIC BLOOD PRESSURE: 149 MMHG | OXYGEN SATURATION: 98 %

## 2020-03-06 DIAGNOSIS — M47.812 CERVICAL SPONDYLOSIS: Primary | ICD-10-CM

## 2020-03-06 DIAGNOSIS — M79.641 RIGHT HAND PAIN: ICD-10-CM

## 2020-03-06 DIAGNOSIS — M50.30 DDD (DEGENERATIVE DISC DISEASE), CERVICAL: ICD-10-CM

## 2020-03-06 DIAGNOSIS — Z12.31 ENCOUNTER FOR SCREENING MAMMOGRAM FOR BREAST CANCER: ICD-10-CM

## 2020-03-06 PROCEDURE — 3078F DIAST BP <80 MM HG: CPT | Mod: CPTII,S$GLB,, | Performed by: PAIN MEDICINE

## 2020-03-06 PROCEDURE — 3078F PR MOST RECENT DIASTOLIC BLOOD PRESSURE < 80 MM HG: ICD-10-PCS | Mod: CPTII,S$GLB,, | Performed by: PAIN MEDICINE

## 2020-03-06 PROCEDURE — 3077F SYST BP >= 140 MM HG: CPT | Mod: CPTII,S$GLB,, | Performed by: PAIN MEDICINE

## 2020-03-06 PROCEDURE — 3077F PR MOST RECENT SYSTOLIC BLOOD PRESSURE >= 140 MM HG: ICD-10-PCS | Mod: CPTII,S$GLB,, | Performed by: PAIN MEDICINE

## 2020-03-06 PROCEDURE — 77063 MAMMO DIGITAL SCREENING BILAT WITH TOMOSYNTHESIS_CAD: ICD-10-PCS | Mod: 26,,, | Performed by: RADIOLOGY

## 2020-03-06 PROCEDURE — 3008F PR BODY MASS INDEX (BMI) DOCUMENTED: ICD-10-PCS | Mod: CPTII,S$GLB,, | Performed by: PAIN MEDICINE

## 2020-03-06 PROCEDURE — 77063 BREAST TOMOSYNTHESIS BI: CPT | Mod: 26,,, | Performed by: RADIOLOGY

## 2020-03-06 PROCEDURE — 3008F BODY MASS INDEX DOCD: CPT | Mod: CPTII,S$GLB,, | Performed by: PAIN MEDICINE

## 2020-03-06 PROCEDURE — 99999 PR PBB SHADOW E&M-EST. PATIENT-LVL III: CPT | Mod: PBBFAC,,, | Performed by: PAIN MEDICINE

## 2020-03-06 PROCEDURE — 77067 SCR MAMMO BI INCL CAD: CPT | Mod: TC

## 2020-03-06 PROCEDURE — 99213 PR OFFICE/OUTPT VISIT, EST, LEVL III, 20-29 MIN: ICD-10-PCS | Mod: S$GLB,,, | Performed by: PAIN MEDICINE

## 2020-03-06 PROCEDURE — 77067 SCR MAMMO BI INCL CAD: CPT | Mod: 26,,, | Performed by: RADIOLOGY

## 2020-03-06 PROCEDURE — 99999 PR PBB SHADOW E&M-EST. PATIENT-LVL III: ICD-10-PCS | Mod: PBBFAC,,, | Performed by: PAIN MEDICINE

## 2020-03-06 PROCEDURE — 77067 MAMMO DIGITAL SCREENING BILAT WITH TOMOSYNTHESIS_CAD: ICD-10-PCS | Mod: 26,,, | Performed by: RADIOLOGY

## 2020-03-06 PROCEDURE — 99213 OFFICE O/P EST LOW 20 MIN: CPT | Mod: S$GLB,,, | Performed by: PAIN MEDICINE
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
## 2020-03-13 DIAGNOSIS — I95.9 HYPOTENSION, UNSPECIFIED HYPOTENSION TYPE: ICD-10-CM

## 2020-03-13 RX ORDER — METOPROLOL TARTRATE 25 MG/1
25 TABLET, FILM COATED ORAL DAILY
Qty: 90 TABLET | Refills: 2 | Status: SHIPPED | OUTPATIENT
Start: 2020-03-13 | End: 2020-03-24 | Stop reason: SDUPTHER

## 2020-03-13 NOTE — TELEPHONE ENCOUNTER
----- Message from Niru Santos sent at 3/13/2020  8:28 AM CDT -----  Contact: Self   Type: RX Refill Request    Who Called:  Self     Have you contacted your pharmacy: no     Refill or New Rx: Refill     RX Name and Strength:metoprolol tartrate (LOPRESSOR) 25 MG tablet    Patient would like to know if she can get a 90 day supply     Preferred Pharmacy with phone number:[id number] - [place], [place] - [address] [phone] (Phone)  [phone] (Fax)        Local or Mail Order: Local     Ordering Provider: Dr. Cintron     Would the patient rather a call back or a response via [facility]?  Call     Best Call Back Number:[phone]

## 2020-03-16 DIAGNOSIS — M47.812 CERVICAL SPONDYLOSIS: Primary | ICD-10-CM

## 2020-03-16 DIAGNOSIS — M50.30 DDD (DEGENERATIVE DISC DISEASE), CERVICAL: ICD-10-CM

## 2020-03-16 RX ORDER — TIZANIDINE 4 MG/1
4 TABLET ORAL NIGHTLY
Qty: 30 TABLET | Refills: 11 | Status: SHIPPED | OUTPATIENT
Start: 2020-03-16 | End: 2020-03-24 | Stop reason: SDUPTHER

## 2020-03-23 ENCOUNTER — PATIENT MESSAGE (OUTPATIENT)
Dept: FAMILY MEDICINE | Facility: CLINIC | Age: 60
End: 2020-03-23

## 2020-03-23 RX ORDER — HYDROCHLOROTHIAZIDE 12.5 MG/1
12.5 CAPSULE ORAL DAILY
Qty: 90 CAPSULE | Refills: 1 | OUTPATIENT
Start: 2020-03-23

## 2020-03-24 DIAGNOSIS — M50.30 DDD (DEGENERATIVE DISC DISEASE), CERVICAL: ICD-10-CM

## 2020-03-24 DIAGNOSIS — I95.9 HYPOTENSION, UNSPECIFIED HYPOTENSION TYPE: ICD-10-CM

## 2020-03-24 DIAGNOSIS — M47.812 CERVICAL SPONDYLOSIS: ICD-10-CM

## 2020-03-24 RX ORDER — TIZANIDINE 4 MG/1
4 TABLET ORAL NIGHTLY
Qty: 30 TABLET | Refills: 11 | Status: SHIPPED | OUTPATIENT
Start: 2020-03-24 | End: 2020-05-04 | Stop reason: SDUPTHER

## 2020-03-24 RX ORDER — METOPROLOL TARTRATE 25 MG/1
25 TABLET, FILM COATED ORAL DAILY
Qty: 90 TABLET | Refills: 2 | Status: SHIPPED | OUTPATIENT
Start: 2020-03-24 | End: 2020-11-29 | Stop reason: SDUPTHER

## 2020-03-24 RX ORDER — MONTELUKAST SODIUM 10 MG/1
10 TABLET ORAL NIGHTLY
Qty: 90 TABLET | Refills: 2 | Status: SHIPPED | OUTPATIENT
Start: 2020-03-24 | End: 2020-12-28

## 2020-03-24 RX ORDER — DICYCLOMINE HYDROCHLORIDE 20 MG/1
20 TABLET ORAL 2 TIMES DAILY
Qty: 60 TABLET | Refills: 2 | Status: SHIPPED | OUTPATIENT
Start: 2020-03-24 | End: 2020-06-29

## 2020-03-25 NOTE — TELEPHONE ENCOUNTER
Pt advised prescriptions has been sent to the pharmacy.  Pt would also like a 40mg pill of dicyclomine so she won't have to take so many pills a day.

## 2020-03-28 ENCOUNTER — PATIENT MESSAGE (OUTPATIENT)
Dept: FAMILY MEDICINE | Facility: CLINIC | Age: 60
End: 2020-03-28

## 2020-03-28 DIAGNOSIS — F51.01 PRIMARY INSOMNIA: ICD-10-CM

## 2020-03-29 RX ORDER — TRAZODONE HYDROCHLORIDE 150 MG/1
75 TABLET ORAL NIGHTLY
Qty: 45 TABLET | Refills: 1 | Status: SHIPPED | OUTPATIENT
Start: 2020-03-29 | End: 2020-07-21 | Stop reason: SDUPTHER

## 2020-03-30 RX ORDER — HYDROCHLOROTHIAZIDE 12.5 MG/1
12.5 CAPSULE ORAL DAILY
Qty: 90 CAPSULE | Refills: 1 | Status: SHIPPED | OUTPATIENT
Start: 2020-03-30 | End: 2020-07-08 | Stop reason: SDUPTHER

## 2020-04-23 DIAGNOSIS — F41.9 ANXIETY: ICD-10-CM

## 2020-04-23 RX ORDER — FLUOXETINE HYDROCHLORIDE 20 MG/1
20 CAPSULE ORAL EVERY MORNING
Qty: 90 CAPSULE | Refills: 1 | Status: SHIPPED | OUTPATIENT
Start: 2020-04-23 | End: 2020-07-25 | Stop reason: SDUPTHER

## 2020-04-28 ENCOUNTER — OFFICE VISIT (OUTPATIENT)
Dept: FAMILY MEDICINE | Facility: CLINIC | Age: 60
End: 2020-04-28
Payer: COMMERCIAL

## 2020-04-28 DIAGNOSIS — R39.9 SYMPTOMS INVOLVING URINARY SYSTEM: ICD-10-CM

## 2020-04-28 DIAGNOSIS — N30.00 ACUTE CYSTITIS WITHOUT HEMATURIA: Primary | ICD-10-CM

## 2020-04-28 DIAGNOSIS — I10 ESSENTIAL HYPERTENSION: ICD-10-CM

## 2020-04-28 DIAGNOSIS — J30.1 SEASONAL ALLERGIC RHINITIS DUE TO POLLEN: ICD-10-CM

## 2020-04-28 PROCEDURE — 99213 PR OFFICE/OUTPT VISIT, EST, LEVL III, 20-29 MIN: ICD-10-PCS | Mod: 95,,, | Performed by: INTERNAL MEDICINE

## 2020-04-28 PROCEDURE — 99213 OFFICE O/P EST LOW 20 MIN: CPT | Mod: 95,,, | Performed by: INTERNAL MEDICINE

## 2020-04-28 RX ORDER — PHENAZOPYRIDINE HYDROCHLORIDE 200 MG/1
200 TABLET, FILM COATED ORAL
Qty: 6 TABLET | Refills: 0 | Status: SHIPPED | OUTPATIENT
Start: 2020-04-28 | End: 2020-04-30

## 2020-04-28 RX ORDER — SULFAMETHOXAZOLE AND TRIMETHOPRIM 800; 160 MG/1; MG/1
1 TABLET ORAL 2 TIMES DAILY
Qty: 10 TABLET | Refills: 0 | Status: SHIPPED | OUTPATIENT
Start: 2020-04-28 | End: 2020-05-03

## 2020-04-28 NOTE — PROGRESS NOTES
Established Patient - Audio Only Telehealth Visit     The patient location is: home  The chief complaint leading to consultation is: probable UTI  Visit type: Virtual visit with audio only (telephone). Duration: 13 minutes     The reason for the audio only service rather than synchronous audio and video virtual visit was related to technical difficulties or patient preference/necessity.     Each patient to whom I provide medical services by telemedicine is:  (1) informed of the relationship between the physician and patient and the respective role of any other health care provider with respect to management of the patient; and (2) notified that they may decline to receive medical services by telemedicine and may withdraw from such care at any time. Patient verbally consented to receive this service via voice-only telephone call.       Subjective:       Patient ID: Alyce Zurita is a pleasant 59 y.o. White female.    Chief Complaint: Dysuria      Patient is a pt I saw for the first time to establish care on 03/06/2020. See list of problems below.  In the interval, was seen by Dr. Luna for pain management, mainly re: neck pain, see his thorough assessment and plan of care.     HPI     She reports having had symptoms of UTI for the last 4 days. She has dysuria, frequency, but no blood in the urine, no back pain and no fever.  It is not the first episode. She has good response to sulfas usually as per her report. Of note she states she had a hysterectomy with as complication adherences to the bladder making her more susceptible to infections?  She tried OTC Azo and wanted to find pyridium that was sold out.   She also mentions sinus issues that is a chronic issue for her, she uses Flonase, antihistamine and saline nasal spray. She wonders if she needs an antibiotic for this.    Patient Active Problem List   Diagnosis    Chronic neck pain    Primary insomnia    Obesity (BMI 30.0-34.9)    Carpal tunnel syndrome of  left wrist    Wrist pain, chronic, left    Anxiety    Coronary artery disease involving native coronary artery of native heart without angina pectoris    Abnormal nuclear stress test    Chronic sinusitis    Cervical spondylosis    DDD (degenerative disc disease), cervical    Myofascial pain    Chronic pain of left knee    Right hand pain        Review of Systems   Constitutional: Negative.    HENT: Positive for congestion, postnasal drip, rhinorrhea and sinus pressure.    Genitourinary: Positive for dysuria and frequency.   All other systems reviewed and are negative.      Objective:      Physical Exam    There were no vitals filed for this visit.  There is no height or weight on file to calculate BMI.    RESULTS: Reviewed labs from last 12 months    Assessment:       1. Acute cystitis without hematuria    2. Seasonal allergic rhinitis due to pollen        Plan:   Alyce was seen today for dysuria.    Diagnoses and all orders for this visit:    Acute cystitis without hematuria  -     phenazopyridine (PYRIDIUM) 200 MG tablet; Take 1 tablet (200 mg total) by mouth 3 (three) times daily with meals. To be used alongside antibiotic for not more than 2 days for 2 days  -     sulfamethoxazole-trimethoprim 800-160mg (BACTRIM DS) 800-160 mg Tab; Take 1 tablet by mouth 2 (two) times daily. for 5 days    Due to COVID-19 pandemics issue, pt cannot have UA/culture performed. I will order Bactrim that she already took in the past with good success. She will take it for 5 days as not first episode. No allergy reported, last creat level was fine. I will add at her request Pyridium for 2 days alongside antibiotic. Advised pt to call back if any concern or question, or if symptom/sign of concern I detailed for her.    Seasonal allergic rhinitis due to pollen    No fever, symptoms seem rather related to allergic rhinitis. I advised her to go on with same treatment, to use saline nasal spray liberally. I told her that giving  "an antibiotic at that time was not indicated, furthermore she will be on Bactrim for UTI. She may call back if fever or other concerning symptom.     Follow up if symptoms worsen or fail to improve.       This service was not originating from a related E/M service provided within the previous 7 days nor will  to an E/M service or procedure within the next 24 hours or my soonest available appointment.  Prevailing standard of care was able to be met in this audio-only visit.      Addendum 05/07/2020: pt calls reporting that she had a tremendous response to Bactrim as ordered on 04/28/2020 for probable UTI, and had "never fell so good for a long time". She took all of the pills. She reports that she starts to have some abdominal pain again yesterday and wanted to see if she may obtain a new order for a Bactrim course.  I offered a virtual visit to assess this abdominal pain, she declined. She stated that she had "bladder issue" but no UTI. She wanted to do lab work.  She plans to come and do this work in our Clinic when we open it again, after 05/18/2020.  Orders placed. Pt to call after 05/18/2020 to schedule an appt for the lab then we will reassess.     "

## 2020-05-04 DIAGNOSIS — M50.30 DDD (DEGENERATIVE DISC DISEASE), CERVICAL: ICD-10-CM

## 2020-05-04 DIAGNOSIS — M47.812 CERVICAL SPONDYLOSIS: ICD-10-CM

## 2020-05-06 ENCOUNTER — PATIENT MESSAGE (OUTPATIENT)
Dept: FAMILY MEDICINE | Facility: CLINIC | Age: 60
End: 2020-05-06

## 2020-05-06 RX ORDER — TIZANIDINE 4 MG/1
4 TABLET ORAL NIGHTLY
Qty: 30 TABLET | Refills: 11 | Status: SHIPPED | OUTPATIENT
Start: 2020-05-06 | End: 2020-05-22 | Stop reason: SDUPTHER

## 2020-05-14 ENCOUNTER — OFFICE VISIT (OUTPATIENT)
Dept: ORTHOPEDICS | Facility: CLINIC | Age: 60
End: 2020-05-14
Payer: COMMERCIAL

## 2020-05-14 VITALS
HEIGHT: 65 IN | SYSTOLIC BLOOD PRESSURE: 111 MMHG | HEART RATE: 52 BPM | WEIGHT: 197 LBS | DIASTOLIC BLOOD PRESSURE: 62 MMHG | OXYGEN SATURATION: 97 % | RESPIRATION RATE: 18 BRPM | BODY MASS INDEX: 32.82 KG/M2

## 2020-05-14 DIAGNOSIS — M65.322 TRIGGER INDEX FINGER OF LEFT HAND: ICD-10-CM

## 2020-05-14 DIAGNOSIS — M79.641 RIGHT HAND PAIN: ICD-10-CM

## 2020-05-14 DIAGNOSIS — M65.321 TRIGGER INDEX FINGER OF RIGHT HAND: Primary | ICD-10-CM

## 2020-05-14 PROCEDURE — 3008F BODY MASS INDEX DOCD: CPT | Mod: CPTII,S$GLB,, | Performed by: ORTHOPAEDIC SURGERY

## 2020-05-14 PROCEDURE — 99204 PR OFFICE/OUTPT VISIT, NEW, LEVL IV, 45-59 MIN: ICD-10-PCS | Mod: 25,S$GLB,, | Performed by: ORTHOPAEDIC SURGERY

## 2020-05-14 PROCEDURE — 3074F PR MOST RECENT SYSTOLIC BLOOD PRESSURE < 130 MM HG: ICD-10-PCS | Mod: CPTII,S$GLB,, | Performed by: ORTHOPAEDIC SURGERY

## 2020-05-14 PROCEDURE — 20550 NJX 1 TENDON SHEATH/LIGAMENT: CPT | Mod: 51,F6,S$GLB, | Performed by: ORTHOPAEDIC SURGERY

## 2020-05-14 PROCEDURE — 99204 OFFICE O/P NEW MOD 45 MIN: CPT | Mod: 25,S$GLB,, | Performed by: ORTHOPAEDIC SURGERY

## 2020-05-14 PROCEDURE — 3078F DIAST BP <80 MM HG: CPT | Mod: CPTII,S$GLB,, | Performed by: ORTHOPAEDIC SURGERY

## 2020-05-14 PROCEDURE — 3074F SYST BP LT 130 MM HG: CPT | Mod: CPTII,S$GLB,, | Performed by: ORTHOPAEDIC SURGERY

## 2020-05-14 PROCEDURE — 3008F PR BODY MASS INDEX (BMI) DOCUMENTED: ICD-10-PCS | Mod: CPTII,S$GLB,, | Performed by: ORTHOPAEDIC SURGERY

## 2020-05-14 PROCEDURE — 3078F PR MOST RECENT DIASTOLIC BLOOD PRESSURE < 80 MM HG: ICD-10-PCS | Mod: CPTII,S$GLB,, | Performed by: ORTHOPAEDIC SURGERY

## 2020-05-14 PROCEDURE — 20550: ICD-10-PCS | Mod: F1,S$GLB,, | Performed by: ORTHOPAEDIC SURGERY

## 2020-05-14 PROCEDURE — 99999 PR PBB SHADOW E&M-EST. PATIENT-LVL V: CPT | Mod: PBBFAC,,, | Performed by: ORTHOPAEDIC SURGERY

## 2020-05-14 PROCEDURE — 20550 NJX 1 TENDON SHEATH/LIGAMENT: CPT | Mod: F1,S$GLB,, | Performed by: ORTHOPAEDIC SURGERY

## 2020-05-14 PROCEDURE — 99999 PR PBB SHADOW E&M-EST. PATIENT-LVL V: ICD-10-PCS | Mod: PBBFAC,,, | Performed by: ORTHOPAEDIC SURGERY

## 2020-05-14 RX ORDER — TRIAMCINOLONE ACETONIDE 40 MG/ML
40 INJECTION, SUSPENSION INTRA-ARTICULAR; INTRAMUSCULAR
Status: DISCONTINUED | OUTPATIENT
Start: 2020-05-14 | End: 2020-05-14 | Stop reason: HOSPADM

## 2020-05-14 RX ADMIN — TRIAMCINOLONE ACETONIDE 40 MG: 40 INJECTION, SUSPENSION INTRA-ARTICULAR; INTRAMUSCULAR at 09:05

## 2020-05-14 NOTE — PROCEDURES
Tendon Sheath: R index MCP, L index MCP  Date/Time: 5/14/2020 9:15 AM  Performed by: Joyce Ross MD  Authorized by: Joyce Ross MD     Consent Done?:  Yes (Written)  Indications:  Pain  Timeout: prior to procedure the correct patient, procedure, and site was verified    Prep: patient was prepped and draped in usual sterile fashion      Local anesthesia used?: Yes    Local anesthetic:  Topical anesthetic  Location:  Index finger  Site:  R index MCP and L index MCP  Needle size:  25 G  Approach:  Volar  Medications:  40 mg triamcinolone acetonide 40 mg/mL; 40 mg triamcinolone acetonide 40 mg/mL  Patient tolerance:  Patient tolerated the procedure well with no immediate complications

## 2020-05-14 NOTE — PROGRESS NOTES
Chief Complaint   Patient presents with    Right Hand - Pain    Left Hand - Pain     This patient was seen in consultation at the request of Dr. Chaitanya Luna*     Initial visit (05/14/2020): Alyce Zurita is a 59 y.o. female who presents today complaining of Pain of the Right Hand and Pain of the Left Hand  Duration of symptoms:  About 2 years   Trauma or new activity: no    Had a trigger finger on the left and this feels different   Has pain to MCP of right ring finger   Does experience popping but no locking   Pain is constant  Aggravating factors: use of the hand   Relieving factors: somewhat better with rest   Radicular symptoms: +  numbness, paresthesias  In the MCP joint when she works with her hands  No CTS type symptoms   Prior treatment:  tylenol  and salon pas without improvement in pain.     Pain does interfere with activities of daily living .    This is the extent of the patient's complaints at this time.     Hand dominance: Left     Review of Systems   All other systems reviewed and are negative.        Review of patient's allergies indicates:   Allergen Reactions    Other omega-3s Nausea And Vomiting     chlorine    Rofecoxib Nausea And Vomiting         Current Outpatient Medications:     aspirin (ECOTRIN) 81 MG EC tablet, Take 1 tablet (81 mg total) by mouth once daily., Disp: 90 tablet, Rfl: 3    cholecalciferol, vitamin D3, (VITAMIN D3 ORAL), Take by mouth 2 (two) times daily., Disp: , Rfl:     FLUoxetine 20 MG capsule, Take 1 capsule (20 mg total) by mouth every morning., Disp: 90 capsule, Rfl: 1    gabapentin (NEURONTIN) 300 MG capsule, Take 2 tablet in the morning and 2 tablet nightly, Disp: 360 capsule, Rfl: 3    hydroCHLOROthiazide (MICROZIDE) 12.5 mg capsule, Take 1 capsule (12.5 mg total) by mouth once daily., Disp: 90 capsule, Rfl: 1    meclizine (ANTIVERT) 25 mg tablet, Take 1 tablet (25 mg total) by mouth 3 (three) times daily as needed., Disp: 90 tablet, Rfl: 0     metoprolol tartrate (LOPRESSOR) 25 MG tablet, Take 1 tablet (25 mg total) by mouth once daily., Disp: 90 tablet, Rfl: 2    montelukast (SINGULAIR) 10 mg tablet, Take 1 tablet (10 mg total) by mouth every evening., Disp: 90 tablet, Rfl: 2    omeprazole (PRILOSEC) 40 MG capsule, Take 1 capsule (40 mg total) by mouth 2 (two) times daily before meals., Disp: 180 capsule, Rfl: 1    potassium chloride SA (K-DUR,KLOR-CON) 20 MEQ tablet, Take 1 tablet (20 mEq total) by mouth 2 (two) times daily., Disp: 90 tablet, Rfl: 1    tiZANidine (ZANAFLEX) 4 MG tablet, Take 1 tablet (4 mg total) by mouth nightly., Disp: 30 tablet, Rfl: 11    traZODone (DESYREL) 150 MG tablet, Take 0.5 tablets (75 mg total) by mouth every evening., Disp: 45 tablet, Rfl: 1    clopidogrel (PLAVIX) 75 mg tablet, , Disp: , Rfl:     dextromethorphan-guaifenesin  mg (MUCINEX DM)  mg per 12 hr tablet, Take 1 tablet by mouth every 12 (twelve) hours., Disp: , Rfl:     fluconazole (DIFLUCAN) 150 MG Tab, , Disp: , Rfl:     fluticasone (FLONASE) 50 mcg/actuation nasal spray, 2 sprays by Each Nare route once daily., Disp: , Rfl:     glucosamine/chondr gray A sod (OSTEO BI-FLEX ORAL), Take by mouth 2 (two) times daily., Disp: , Rfl:     ibuprofen (ADVIL,MOTRIN) 600 MG tablet, Take 600 mg by mouth 3 (three) times daily., Disp: , Rfl:     nitroGLYCERIN (NITROSTAT) 0.4 MG SL tablet, Place 0.4 mg under the tongue., Disp: , Rfl:     ranitidine (ZANTAC) 300 MG tablet, Take 1 tablet (300 mg total) by mouth 2 (two) times daily. (Patient not taking: Reported on 5/14/2020), Disp: 180 tablet, Rfl: 3    traMADol (ULTRAM) 50 mg tablet, Take 50 mg by mouth., Disp: , Rfl:     Past Medical History:   Diagnosis Date    Allergy     Anticoagulant long-term use     Anxiety 11/16/2018    Arthritis     Breast injury     left 10 yrs ago/ hit in chest    Carpal tunnel syndrome of left wrist 9/10/2018    GERD (gastroesophageal reflux disease)     Hyperlipemia      Hypertension, uncontrolled 8/17/2018    Interstitial cystitis     Kidney problem     Meningitis     3 months old    PONV (postoperative nausea and vomiting)     pt needs meds for PONV    Tachycardia        Patient Active Problem List   Diagnosis    Chronic neck pain    Primary insomnia    Obesity (BMI 30.0-34.9)    Carpal tunnel syndrome of left wrist    Wrist pain, chronic, left    Anxiety    Coronary artery disease involving native coronary artery of native heart without angina pectoris    Abnormal nuclear stress test    Chronic sinusitis    Cervical spondylosis    DDD (degenerative disc disease), cervical    Myofascial pain    Chronic pain of left knee    Right hand pain       Past Surgical History:   Procedure Laterality Date    APPENDECTOMY      CARDIAC SURGERY      ABLATION    EPIDURAL STEROID INJECTION Left 1/8/2020    Procedure: Cervical Medial Branch Blocks;  Surgeon: Chaitanya Luna Jr., MD;  Location: [facility] ENDO;  Service: Pain Management;  Laterality: Left;  Left C4, C5, C6 MBB    Arrive @ 0945; ASA and Plavix last 12/31; No DM; NO Sedation    EPIDURAL STEROID INJECTION Left 1/22/2020    Procedure: Cervical Medial Branch Blocks;  Surgeon: Chaitanya Luna Jr., MD;  Location: [facility] ENDO;  Service: Pain Management;  Laterality: Left;  Left C4-6 MBB    Arrive @ 1015; NO Sedation; ASA/Plavix last 1/14; No DM    EPIDURAL STEROID INJECTION Left 2/12/2020    Procedure: Cervical Radiofrequency Thermocoagulation of Medial Branches;  Surgeon: Chaitanya Luna Jr., MD;  Location: [facility] ENDO;  Service: Pain Management;  Laterality: Left;  Left C4-6 RFA    Arrive @ 0730; IV Sedation; ASA/Plavix last doses 2/4/20; No DM    Heart Procedure       HYSTERECTOMY  1993    KIDNEY SURGERY      LEFT HEART CATHETERIZATION Left 2/25/2019    Procedure: Left heart cath 12pm start, R rad access;  Surgeon: Chaitanya Angela MD;  Location: [facility] CATH LAB;  Service: Cardiology;  Laterality:  "Left;  RN PREOP 2/20/19  ARRIVAL TIME 10AM    OOPHORECTOMY Bilateral 1993    TEMPOROMANDIBULAR JOINT SURGERY         Social History     Tobacco Use    Smoking status: Never Smoker    Smokeless tobacco: Never Used   Substance Use Topics    Alcohol use: No    Drug use: No       Family History   Problem Relation Age of Onset    Arthritis Mother     Glaucoma Mother     Breast cancer Mother     Asthma Daughter     Breast cancer Maternal Aunt     Breast cancer Maternal Grandmother     Breast cancer Paternal Grandmother     Breast cancer Maternal Aunt      Physical Exam:   Vitals:    05/14/20 0855   BP: 111/62   Pulse: (!) 52   Resp: 18   SpO2: 97%   Weight: 89.4 kg (197 lb)   Height: 5' 5" (1.651 m)   PainSc:   4   PainLoc: Hand     General: Weight: 89.4 kg (197 lb) Body mass index is 32.78 kg/m².   Patient is alert, awake and oriented to time, place and person. Mood and affect are appropriate.  Patient does not appear to be in any distress, denies any constitutional symptoms and appears stated age.   HEENT:  Pupils are equal and round, sclera are not injected. External examination of ears and nose reveals no abnormalities. Cranial nerves II-X are grossly intact  Skin:  no rashes, abrasions or open wounds on the affected extremity   Resp:  No respiratory distress or audible wheezing   CV: 2+  pulses, all extremities warm and well perfused   Bilateral Hand/Wrist Examination:    Observation/Inspection:  Swelling  none    Deformity  none  Discoloration  none     Scars   none    Atrophy  none      Neurovascular Exam:  Digits WWP, brisk CR < 3s throughout  NVI motor/LTS to M/R/U nerves, radial pulse 2+  Tinel's Test - Carpal Tunnel  Neg  Median Nerve Compression Test Neg    Triggering of bilateral index fingers    ROM hand/wrist/elbow full, painless      Imaging: 3 views right hand:  No degenerative changes.  Tuft fracture to left small finger distal phalanx.    I personally reviewed and interpreted the patient's " imaging obtained today in clinic     Assessment: 59 y.o. female with bilat index trigger finger, fracture of left small finger distal phalanx    I explained my diagnostic impression and the reasoning behind it in detail, using layman's terms.  Models and/or pictures were used to help in the explanation.  We discussed non operative and operative treatment modalities -- She would like to start with non-operative treatment in the form of injection.     Plan:   - Injection of the bilateral  trigger finger - index performed, please see procedure note for more details.  Prior to the injection risks and benefits of corticosteroid injection were discussed with the patient including pain, infection, bleeding, skin color changes, swelling, steroid flare. We discussed that over time injections can result in chondral damage, acceleration of arthritis formation, damage to tendons and damage to joints.  The patient consented for the procedure.  Post-injection instructions were given to the patient in writing.  - will need new hand x-ray in about a month  - Return to clinic PRN to discuss shoulder at her convenience     All questions were answered in detail. The patient is in full agreement with the treatment plan and will proceed accordingly.    A note notifying Dr. Chaitanya Luna* of my findings was sent via the electronic medical record     This note was created by combination of typed  and M-Modal dictation. Transcription and phonetic errors may be present.  If there are any questions, please contact me.

## 2020-05-14 NOTE — LETTER
May 14, 2020      Chaitanya Luna Jr., MD  [address]  [address]  [place] [address]           [facility]  [address]  [place] [address]  Phone: [phone]          Patient: Alyce Zurita   MR Number: [id number]   YOB: 1960   Date of Visit: 5/14/2020       Dear Dr. Chaitanya Luna Jr.:    Thank you for referring Alyce Zurita to me for evaluation. Attached you will find relevant portions of my assessment and plan of care.    If you have questions, please do not hesitate to call me. I look forward to following Alyce Zurita along with you.    Sincerely,    Joyce Ross MD    Enclosure  CC:  No Recipients    If you would like to receive this communication electronically, please contact [email or web address] or [phone] to request more information on [facility] access.    For providers and/or their staff who would like to refer a patient to [facility], please contact us through our one-stop-shop provider referral line, [facility], at [phone].    If you feel you have received this communication in error or would no longer like to receive these types of communications, please e-mail [email or web address]

## 2020-05-19 ENCOUNTER — LAB VISIT (OUTPATIENT)
Dept: LAB | Facility: HOSPITAL | Age: 60
End: 2020-05-19
Attending: INTERNAL MEDICINE
Payer: COMMERCIAL

## 2020-05-19 DIAGNOSIS — I10 ESSENTIAL HYPERTENSION: ICD-10-CM

## 2020-05-19 LAB
ALBUMIN SERPL BCP-MCNC: 3.8 G/DL (ref 3.5–5.2)
ALP SERPL-CCNC: 87 U/L (ref 55–135)
ALT SERPL W/O P-5'-P-CCNC: 17 U/L (ref 10–44)
ANION GAP SERPL CALC-SCNC: 9 MMOL/L (ref 8–16)
AST SERPL-CCNC: 19 U/L (ref 10–40)
BASOPHILS # BLD AUTO: 0.01 K/UL (ref 0–0.2)
BASOPHILS NFR BLD: 0.1 % (ref 0–1.9)
BILIRUB SERPL-MCNC: 0.4 MG/DL (ref 0.1–1)
BUN SERPL-MCNC: 16 MG/DL (ref 6–20)
CALCIUM SERPL-MCNC: 9.2 MG/DL (ref 8.7–10.5)
CHLORIDE SERPL-SCNC: 106 MMOL/L (ref 95–110)
CHOLEST SERPL-MCNC: 215 MG/DL (ref 120–199)
CHOLEST/HDLC SERPL: 4.8 {RATIO} (ref 2–5)
CO2 SERPL-SCNC: 25 MMOL/L (ref 23–29)
CREAT SERPL-MCNC: 0.8 MG/DL (ref 0.5–1.4)
DIFFERENTIAL METHOD: ABNORMAL
EOSINOPHIL # BLD AUTO: 0 K/UL (ref 0–0.5)
EOSINOPHIL NFR BLD: 0.3 % (ref 0–8)
ERYTHROCYTE [DISTWIDTH] IN BLOOD BY AUTOMATED COUNT: 14.4 % (ref 11.5–14.5)
EST. GFR  (AFRICAN AMERICAN): >60 ML/MIN/1.73 M^2
EST. GFR  (NON AFRICAN AMERICAN): >60 ML/MIN/1.73 M^2
GLUCOSE SERPL-MCNC: 99 MG/DL (ref 70–110)
HCT VFR BLD AUTO: 41 % (ref 37–48.5)
HDLC SERPL-MCNC: 45 MG/DL (ref 40–75)
HDLC SERPL: 20.9 % (ref 20–50)
HGB BLD-MCNC: 12.3 G/DL (ref 12–16)
IMM GRANULOCYTES # BLD AUTO: 0.04 K/UL (ref 0–0.04)
IMM GRANULOCYTES NFR BLD AUTO: 0.4 % (ref 0–0.5)
LDLC SERPL CALC-MCNC: 143.4 MG/DL (ref 63–159)
LYMPHOCYTES # BLD AUTO: 2.9 K/UL (ref 1–4.8)
LYMPHOCYTES NFR BLD: 29.7 % (ref 18–48)
MCH RBC QN AUTO: 28.8 PG (ref 27–31)
MCHC RBC AUTO-ENTMCNC: 30 G/DL (ref 32–36)
MCV RBC AUTO: 96 FL (ref 82–98)
MONOCYTES # BLD AUTO: 0.8 K/UL (ref 0.3–1)
MONOCYTES NFR BLD: 7.9 % (ref 4–15)
NEUTROPHILS # BLD AUTO: 6 K/UL (ref 1.8–7.7)
NEUTROPHILS NFR BLD: 61.6 % (ref 38–73)
NONHDLC SERPL-MCNC: 170 MG/DL
NRBC BLD-RTO: 0 /100 WBC
PLATELET # BLD AUTO: 269 K/UL (ref 150–350)
PMV BLD AUTO: 11.5 FL (ref 9.2–12.9)
POTASSIUM SERPL-SCNC: 4.2 MMOL/L (ref 3.5–5.1)
PROT SERPL-MCNC: 7.4 G/DL (ref 6–8.4)
RBC # BLD AUTO: 4.27 M/UL (ref 4–5.4)
SODIUM SERPL-SCNC: 140 MMOL/L (ref 136–145)
TRIGL SERPL-MCNC: 133 MG/DL (ref 30–150)
WBC # BLD AUTO: 9.74 K/UL (ref 3.9–12.7)

## 2020-05-19 PROCEDURE — 85025 COMPLETE CBC W/AUTO DIFF WBC: CPT

## 2020-05-19 PROCEDURE — 80061 LIPID PANEL: CPT

## 2020-05-19 PROCEDURE — 80053 COMPREHEN METABOLIC PANEL: CPT

## 2020-05-19 PROCEDURE — 36415 COLL VENOUS BLD VENIPUNCTURE: CPT | Mod: PO

## 2020-05-22 ENCOUNTER — TELEPHONE (OUTPATIENT)
Dept: PAIN MEDICINE | Facility: CLINIC | Age: 60
End: 2020-05-22

## 2020-05-22 DIAGNOSIS — M47.812 CERVICAL SPONDYLOSIS: ICD-10-CM

## 2020-05-22 DIAGNOSIS — M50.30 DDD (DEGENERATIVE DISC DISEASE), CERVICAL: ICD-10-CM

## 2020-05-22 RX ORDER — TIZANIDINE 4 MG/1
4 TABLET ORAL EVERY 8 HOURS
Qty: 90 TABLET | Refills: 11 | Status: SHIPPED | OUTPATIENT
Start: 2020-05-22 | End: 2021-03-20 | Stop reason: SDUPTHER

## 2020-05-22 NOTE — TELEPHONE ENCOUNTER
----- Message from Kady Santos sent at 5/21/2020  4:08 PM CDT -----  Contact: Patient  Type: Patient Call Back    Who called: Patient    What is the request in detail: Patient is requesting a refill on tiZANidine (ZANAFLEX) 4 MG tablet. She takes medication 2 per day. Please advise.    Can the clinic reply by [facility]? No    Would the patient rather a call back or a response via [facility]? Call    Best call back number: [phone] (home)     Additional Information:    [facility] [address] [place] [address] - [address] [place] [address]  Phone: [phone] Fax: [phone]

## 2020-05-26 ENCOUNTER — OFFICE VISIT (OUTPATIENT)
Dept: ORTHOPEDICS | Facility: CLINIC | Age: 60
End: 2020-05-26
Payer: COMMERCIAL

## 2020-05-26 VITALS
OXYGEN SATURATION: 96 % | HEART RATE: 84 BPM | HEIGHT: 65 IN | RESPIRATION RATE: 17 BRPM | DIASTOLIC BLOOD PRESSURE: 77 MMHG | BODY MASS INDEX: 32.65 KG/M2 | SYSTOLIC BLOOD PRESSURE: 133 MMHG | WEIGHT: 196 LBS

## 2020-05-26 DIAGNOSIS — M25.512 LEFT SHOULDER PAIN, UNSPECIFIED CHRONICITY: Primary | ICD-10-CM

## 2020-05-26 DIAGNOSIS — M25.512 ARTHRALGIA OF LEFT ACROMIOCLAVICULAR JOINT: Primary | ICD-10-CM

## 2020-05-26 PROCEDURE — 20605 INTERMEDIATE JOINT ASPIRATION/INJECTION: L ACROMIOCLAVICULAR: ICD-10-PCS | Mod: LT,S$GLB,, | Performed by: ORTHOPAEDIC SURGERY

## 2020-05-26 PROCEDURE — 3075F PR MOST RECENT SYSTOLIC BLOOD PRESS GE 130-139MM HG: ICD-10-PCS | Mod: CPTII,S$GLB,, | Performed by: ORTHOPAEDIC SURGERY

## 2020-05-26 PROCEDURE — 3075F SYST BP GE 130 - 139MM HG: CPT | Mod: CPTII,S$GLB,, | Performed by: ORTHOPAEDIC SURGERY

## 2020-05-26 PROCEDURE — 99213 PR OFFICE/OUTPT VISIT, EST, LEVL III, 20-29 MIN: ICD-10-PCS | Mod: 25,S$GLB,, | Performed by: ORTHOPAEDIC SURGERY

## 2020-05-26 PROCEDURE — 99999 PR PBB SHADOW E&M-EST. PATIENT-LVL IV: ICD-10-PCS | Mod: PBBFAC,,, | Performed by: ORTHOPAEDIC SURGERY

## 2020-05-26 PROCEDURE — 3008F BODY MASS INDEX DOCD: CPT | Mod: CPTII,S$GLB,, | Performed by: ORTHOPAEDIC SURGERY

## 2020-05-26 PROCEDURE — 3078F PR MOST RECENT DIASTOLIC BLOOD PRESSURE < 80 MM HG: ICD-10-PCS | Mod: CPTII,S$GLB,, | Performed by: ORTHOPAEDIC SURGERY

## 2020-05-26 PROCEDURE — 3078F DIAST BP <80 MM HG: CPT | Mod: CPTII,S$GLB,, | Performed by: ORTHOPAEDIC SURGERY

## 2020-05-26 PROCEDURE — 99213 OFFICE O/P EST LOW 20 MIN: CPT | Mod: 25,S$GLB,, | Performed by: ORTHOPAEDIC SURGERY

## 2020-05-26 PROCEDURE — 99999 PR PBB SHADOW E&M-EST. PATIENT-LVL IV: CPT | Mod: PBBFAC,,, | Performed by: ORTHOPAEDIC SURGERY

## 2020-05-26 PROCEDURE — 3008F PR BODY MASS INDEX (BMI) DOCUMENTED: ICD-10-PCS | Mod: CPTII,S$GLB,, | Performed by: ORTHOPAEDIC SURGERY

## 2020-05-26 PROCEDURE — 20605 DRAIN/INJ JOINT/BURSA W/O US: CPT | Mod: LT,S$GLB,, | Performed by: ORTHOPAEDIC SURGERY

## 2020-05-26 RX ORDER — TRIAMCINOLONE ACETONIDE 40 MG/ML
40 INJECTION, SUSPENSION INTRA-ARTICULAR; INTRAMUSCULAR
Status: DISCONTINUED | OUTPATIENT
Start: 2020-05-26 | End: 2020-05-26 | Stop reason: HOSPADM

## 2020-05-26 RX ADMIN — TRIAMCINOLONE ACETONIDE 40 MG: 40 INJECTION, SUSPENSION INTRA-ARTICULAR; INTRAMUSCULAR at 03:05

## 2020-05-26 NOTE — LETTER
May 26, 2020      [facility]  [address]  [place] [address]  Phone: [phone]       Patient: Alyce Zurita   YOB: 1960  Date of Visit: 05/26/2020    To Whom It May Concern:    Adwoa Zurita  was at [facility] on 05/26/2020. She may return to work/school on 05/26/2020 with no restrictions. She does have a fracture to the left small finger.  If you have any questions or concerns, or if I can be of further assistance, please do not hesitate to contact me.    Sincerely,    Joyce Ross MD

## 2020-05-26 NOTE — PROGRESS NOTES
Chief Complaint   Patient presents with    Left Shoulder - Pain    Left Hand - Finger Injury       HPI: Alyce Zurita is a 59 y.o. female who presents today complaining of left shoulder pain  Duration of symptoms:  About 2 years   Trauma or new activity: no  Pain is constant  Aggravating factors: lifting, raising the arm overhead   Relieving factors: rest   Night pain is present and is disruptive to sleep  Associated symptoms:  swelling - complains of a lump over the AC joint   Prior treatment:  None recently. Had PT over 2 years ago that she says did not work   Pain does interfere with sleep and activities of daily living .    This is the extent of the patient's complaints at this time.       Occupation:     Review of Systems   Unremarkable, no changes since last visit.     No change in medical, surgical, family or surgical history except as stated above       Review of patient's allergies indicates:   Allergen Reactions    Other omega-3s Nausea And Vomiting     chlorine    Rofecoxib Nausea And Vomiting         Current Outpatient Medications:     aspirin (ECOTRIN) 81 MG EC tablet, Take 1 tablet (81 mg total) by mouth once daily., Disp: 90 tablet, Rfl: 3    cholecalciferol, vitamin D3, (VITAMIN D3 ORAL), Take by mouth 2 (two) times daily., Disp: , Rfl:     clopidogrel (PLAVIX) 75 mg tablet, , Disp: , Rfl:     dextromethorphan-guaifenesin  mg (MUCINEX DM)  mg per 12 hr tablet, Take 1 tablet by mouth every 12 (twelve) hours., Disp: , Rfl:     fluconazole (DIFLUCAN) 150 MG Tab, , Disp: , Rfl:     FLUoxetine 20 MG capsule, Take 1 capsule (20 mg total) by mouth every morning., Disp: 90 capsule, Rfl: 1    fluticasone (FLONASE) 50 mcg/actuation nasal spray, 2 sprays by Each Nare route once daily., Disp: , Rfl:     gabapentin (NEURONTIN) 300 MG capsule, Take 2 tablet in the morning and 2 tablet nightly, Disp: 360 capsule, Rfl: 3    glucosamine/chondr gray A sod (OSTEO BI-FLEX ORAL),  Take by mouth 2 (two) times daily., Disp: , Rfl:     hydroCHLOROthiazide (MICROZIDE) 12.5 mg capsule, Take 1 capsule (12.5 mg total) by mouth once daily., Disp: 90 capsule, Rfl: 1    ibuprofen (ADVIL,MOTRIN) 600 MG tablet, Take 600 mg by mouth 3 (three) times daily., Disp: , Rfl:     meclizine (ANTIVERT) 25 mg tablet, Take 1 tablet (25 mg total) by mouth 3 (three) times daily as needed., Disp: 90 tablet, Rfl: 0    metoprolol tartrate (LOPRESSOR) 25 MG tablet, Take 1 tablet (25 mg total) by mouth once daily., Disp: 90 tablet, Rfl: 2    montelukast (SINGULAIR) 10 mg tablet, Take 1 tablet (10 mg total) by mouth every evening., Disp: 90 tablet, Rfl: 2    nitroGLYCERIN (NITROSTAT) 0.4 MG SL tablet, Place 0.4 mg under the tongue., Disp: , Rfl:     omeprazole (PRILOSEC) 40 MG capsule, Take 1 capsule (40 mg total) by mouth 2 (two) times daily before meals., Disp: 180 capsule, Rfl: 1    potassium chloride SA (K-DUR,KLOR-CON) 20 MEQ tablet, Take 1 tablet (20 mEq total) by mouth 2 (two) times daily., Disp: 90 tablet, Rfl: 1    ranitidine (ZANTAC) 300 MG tablet, Take 1 tablet (300 mg total) by mouth 2 (two) times daily., Disp: 180 tablet, Rfl: 3    tiZANidine (ZANAFLEX) 4 MG tablet, Take 1 tablet (4 mg total) by mouth every 8 (eight) hours., Disp: 90 tablet, Rfl: 11    traMADol (ULTRAM) 50 mg tablet, Take 50 mg by mouth., Disp: , Rfl:     traZODone (DESYREL) 150 MG tablet, Take 0.5 tablets (75 mg total) by mouth every evening., Disp: 45 tablet, Rfl: 1    Past Medical History:   Diagnosis Date    Allergy     Anticoagulant long-term use     Anxiety 11/16/2018    Arthritis     Breast injury     left 10 yrs ago/ hit in chest    Carpal tunnel syndrome of left wrist 9/10/2018    GERD (gastroesophageal reflux disease)     Hyperlipemia     Hypertension, uncontrolled 8/17/2018    Interstitial cystitis     Kidney problem     Meningitis     3 months old    PONV (postoperative nausea and vomiting)     pt needs meds  for PONV    Tachycardia        Patient Active Problem List   Diagnosis    Chronic neck pain    Primary insomnia    Obesity (BMI 30.0-34.9)    Carpal tunnel syndrome of left wrist    Wrist pain, chronic, left    Anxiety    Coronary artery disease involving native coronary artery of native heart without angina pectoris    Abnormal nuclear stress test    Chronic sinusitis    Cervical spondylosis    DDD (degenerative disc disease), cervical    Myofascial pain    Chronic pain of left knee    Right hand pain       Past Surgical History:   Procedure Laterality Date    APPENDECTOMY      CARDIAC SURGERY      ABLATION    EPIDURAL STEROID INJECTION Left 1/8/2020    Procedure: Cervical Medial Branch Blocks;  Surgeon: Chaitanya Luna Jr., MD;  Location: [facility] ENDO;  Service: Pain Management;  Laterality: Left;  Left C4, C5, C6 MBB    Arrive @ 0945; ASA and Plavix last 12/31; No DM; NO Sedation    EPIDURAL STEROID INJECTION Left 1/22/2020    Procedure: Cervical Medial Branch Blocks;  Surgeon: Chaitanya Luna Jr., MD;  Location: [facility] ENDO;  Service: Pain Management;  Laterality: Left;  Left C4-6 MBB    Arrive @ 1015; NO Sedation; ASA/Plavix last 1/14; No DM    EPIDURAL STEROID INJECTION Left 2/12/2020    Procedure: Cervical Radiofrequency Thermocoagulation of Medial Branches;  Surgeon: Chaitanya Luna Jr., MD;  Location: [facility] ENDO;  Service: Pain Management;  Laterality: Left;  Left C4-6 RFA    Arrive @ 0730; IV Sedation; ASA/Plavix last doses 2/4/20; No DM    Heart Procedure       HYSTERECTOMY  1993    KIDNEY SURGERY      LEFT HEART CATHETERIZATION Left 2/25/2019    Procedure: Left heart cath 12pm start, R rad access;  Surgeon: Chaitanya Angela MD;  Location: [facility] CATH LAB;  Service: Cardiology;  Laterality: Left;  RN PREOP 2/20/19  ARRIVAL TIME 10AM    OOPHORECTOMY Bilateral 1993    TEMPOROMANDIBULAR JOINT SURGERY         Social History     Tobacco Use    Smoking status: Never Smoker     "Smokeless tobacco: Never Used   Substance Use Topics    Alcohol use: No    Drug use: No       Family History   Problem Relation Age of Onset    Arthritis Mother     Glaucoma Mother     Breast cancer Mother     Asthma Daughter     Breast cancer Maternal Aunt     Breast cancer Maternal Grandmother     Breast cancer Paternal Grandmother     Breast cancer Maternal Aunt        Physical Exam:   Vitals:    05/26/20 1533   BP: 133/77   Pulse: 84   Resp: 17   SpO2: 96%   Weight: 88.9 kg (195 lb 15.8 oz)   Height: 5' 5" (1.651 m)   PainSc:   4   PainLoc: Shoulder       General: Weight: 88.9 kg (195 lb 15.8 oz) Body mass index is 32.61 kg/m².  Patient is alert, awake and oriented to time, place and person. Mood and affect are appropriate.  Patient does not appear to be in any distress, denies any constitutional symptoms and appears stated age.   HEENT: Pupils are equal and round, sclera are not injected. External examination of ears and nose reveals no abnormalities. Cranial nerves II-X are grossly intact  Skin: no rashes, abrasions or open wounds on the affected extremity   Resp: No respiratory distress or audible wheezing   CV: 2+  pulses, all extremities warm and well perfused   Left Shoulder    Shoulder Range of Motion    Right     Left   (Active/Passive)  Forward Elevation     165/165            165/165  External rotation (arm at side)  50/50             50/50   Internal rotation behind the back  L5             L5     Range of motion is not painful       Acromioclavicular joint is tender with assoc ganglion  Crossbody test: positive    Neer's positive  Hawkin's positive    Valentin's positive  Drop arm negative  Belly press negative      Cuff Strength     Right     Left   Supraspinatus        5/5    5/5  Infraspinatus     5/5    5/5  Subscapularis     5/5    5/5    Deltoid testing            5/5    5/5    Speeds negative  Yergasons negative    Elbow examination demonstrates no tenderness to palpation and has normal " range of motion.     LTSI m/u/r  2+ RP  + EPL, IO, FDS, FDP      Imaging:  3 views of the left shoulder:  negative for degenerative changes of the AC joint. The humeral head is well centered on the AP and axillary views.  No calcification at the rotator cuff insertion on the greater tuberosity. There is not significant degenerative change of the glenohumeral joint or posterior subluxation of the humeral head. No acute changes or fracture.      I personally reviewed and interpreted the patient's imaging obtained today in clinic     Assessment: 59 y.o. female with left AC arthralgia with ganglion cyst     I explained my diagnostic impression and the reasoning behind it in detail, using layman's terms.  Models and/or pictures were used to help in the explanation.  Plan:   - Aspiration and Injection of the left acromioclavicular joint  performed, please see procedure note for more details.  Prior to the injection risks and benefits of corticosteroid injection were discussed with the patient including pain, infection, bleeding, skin color changes, swelling, steroid flare. We discussed that over time injections can result in chondral damage, acceleration of arthritis formation, damage to tendons and damage to joints.  The patient consented for the procedure.  Post-injection instructions were given to the patient in writing.  - Return to clinic PRN.    All questions were answered in detail. The patient is in full agreement with the treatment plan and will proceed accordingly.        This note was created by combination of typed  and M-Modal dictation. Transcription and phonetic errors may be present.  If there are any questions, please contact me.

## 2020-05-26 NOTE — PROCEDURES
Intermediate Joint Aspiration/Injection: L acromioclavicular  Date/Time: 5/26/2020 3:30 PM  Performed by: Joyce Ross MD  Authorized by: Joyce Ross MD     Consent Done?:  Yes (Verbal)  Indications:  Pain  Timeout: Prior to procedure the correct patient, procedure, and site was verified      Location:  Shoulder  Site:  L acromioclavicular  Prep: Patient was prepped and draped in usual sterile fashion    Needle size:  22 G  Approach:  Superior  Medications:  40 mg triamcinolone acetonide 40 mg/mL  Patient tolerance:  Patient tolerated the procedure well with no immediate complications     Additional Comments: Attempted aspiration of ganglion -- unsuccessful

## 2020-05-29 ENCOUNTER — OFFICE VISIT (OUTPATIENT)
Dept: FAMILY MEDICINE | Facility: CLINIC | Age: 60
End: 2020-05-29
Payer: COMMERCIAL

## 2020-05-29 ENCOUNTER — TELEPHONE (OUTPATIENT)
Dept: FAMILY MEDICINE | Facility: CLINIC | Age: 60
End: 2020-05-29

## 2020-05-29 VITALS
TEMPERATURE: 98 F | RESPIRATION RATE: 20 BRPM | DIASTOLIC BLOOD PRESSURE: 62 MMHG | WEIGHT: 194.88 LBS | BODY MASS INDEX: 32.47 KG/M2 | OXYGEN SATURATION: 95 % | HEIGHT: 65 IN | SYSTOLIC BLOOD PRESSURE: 112 MMHG | HEART RATE: 60 BPM

## 2020-05-29 DIAGNOSIS — M50.30 DDD (DEGENERATIVE DISC DISEASE), CERVICAL: ICD-10-CM

## 2020-05-29 DIAGNOSIS — Z00.00 ANNUAL PHYSICAL EXAM: Primary | ICD-10-CM

## 2020-05-29 DIAGNOSIS — R30.0 DYSURIA: ICD-10-CM

## 2020-05-29 PROCEDURE — 3008F BODY MASS INDEX DOCD: CPT | Mod: CPTII,S$GLB,, | Performed by: INTERNAL MEDICINE

## 2020-05-29 PROCEDURE — 99214 PR OFFICE/OUTPT VISIT, EST, LEVL IV, 30-39 MIN: ICD-10-PCS | Mod: S$GLB,,, | Performed by: INTERNAL MEDICINE

## 2020-05-29 PROCEDURE — 3074F PR MOST RECENT SYSTOLIC BLOOD PRESSURE < 130 MM HG: ICD-10-PCS | Mod: CPTII,S$GLB,, | Performed by: INTERNAL MEDICINE

## 2020-05-29 PROCEDURE — 3074F SYST BP LT 130 MM HG: CPT | Mod: CPTII,S$GLB,, | Performed by: INTERNAL MEDICINE

## 2020-05-29 PROCEDURE — 99999 PR PBB SHADOW E&M-EST. PATIENT-LVL V: CPT | Mod: PBBFAC,,, | Performed by: INTERNAL MEDICINE

## 2020-05-29 PROCEDURE — 3008F PR BODY MASS INDEX (BMI) DOCUMENTED: ICD-10-PCS | Mod: CPTII,S$GLB,, | Performed by: INTERNAL MEDICINE

## 2020-05-29 PROCEDURE — 99999 PR PBB SHADOW E&M-EST. PATIENT-LVL V: ICD-10-PCS | Mod: PBBFAC,,, | Performed by: INTERNAL MEDICINE

## 2020-05-29 PROCEDURE — 3078F PR MOST RECENT DIASTOLIC BLOOD PRESSURE < 80 MM HG: ICD-10-PCS | Mod: CPTII,S$GLB,, | Performed by: INTERNAL MEDICINE

## 2020-05-29 PROCEDURE — 99214 OFFICE O/P EST MOD 30 MIN: CPT | Mod: S$GLB,,, | Performed by: INTERNAL MEDICINE

## 2020-05-29 PROCEDURE — 3078F DIAST BP <80 MM HG: CPT | Mod: CPTII,S$GLB,, | Performed by: INTERNAL MEDICINE

## 2020-05-29 RX ORDER — PHENAZOPYRIDINE HYDROCHLORIDE 200 MG/1
200 TABLET, FILM COATED ORAL 3 TIMES DAILY PRN
Qty: 6 TABLET | Refills: 0 | Status: SHIPPED | OUTPATIENT
Start: 2020-05-29 | End: 2020-06-08

## 2020-05-29 RX ORDER — SULFAMETHOXAZOLE AND TRIMETHOPRIM 800; 160 MG/1; MG/1
1 TABLET ORAL 2 TIMES DAILY
Qty: 10 TABLET | Refills: 0 | Status: SHIPPED | OUTPATIENT
Start: 2020-05-29 | End: 2020-06-01

## 2020-05-29 NOTE — TELEPHONE ENCOUNTER
Spoke with Ivelisse at [facility] pharmacy. Informed her that the pt should take medication for 5 days. Verbalizes understanding,

## 2020-05-29 NOTE — TELEPHONE ENCOUNTER
Please clarify bactrim; directions state 2 times daily for 3 days but 10 pills sent in; is she supposed to take for 5 days??

## 2020-05-29 NOTE — PROGRESS NOTES
Subjective:       Patient ID: Alyce Zurita is a pleasant 59 y.o. White female patient    Chief Complaint: Annual Exam      Patient is a pt I saw last on 2/28/2020 for dizziness and headaches. See my last notes and list of problems below.  She comes today for an annual physical f-up visit.    HPI       She still has urinary issues. She had surgery for endometriosis 27 year ago with reconstruction.  Had to put her head between her legs to go to be able to urinate. Never saw a Urologist. She received Bactrim at last visit that helped tremendously and would like a new course.         Patient Active Problem List   Diagnosis    Chronic neck pain    Primary insomnia    Obesity (BMI 30.0-34.9)    Carpal tunnel syndrome of left wrist    Wrist pain, chronic, left    Anxiety    Coronary artery disease involving native coronary artery of native heart without angina pectoris    Abnormal nuclear stress test    Chronic sinusitis    Cervical spondylosis    DDD (degenerative disc disease), cervical    Myofascial pain    Chronic pain of left knee    Right hand pain          ACTIVE MEDICAL ISSUES:  Documented in Problem List     PAST MEDICAL HISTORY  Documented     PAST SURGICAL HISTORY:  Documented     SOCIAL HISTORY:  Documented     FAMILY HISTORY:  Documented     ALLERGIES AND MEDICATIONS: updated and reviewed.  Documented    Review of Systems   Constitutional: Negative for activity change and unexpected weight change.   HENT: Negative for hearing loss, rhinorrhea and trouble swallowing.    Eyes: Negative for discharge and visual disturbance.   Respiratory: Negative for chest tightness and wheezing.    Cardiovascular: Negative for chest pain and palpitations.   Gastrointestinal: Negative for blood in stool, constipation, diarrhea and vomiting.   Endocrine: Negative for polydipsia and polyuria.   Genitourinary: Positive for dysuria. Negative for difficulty urinating, hematuria and menstrual problem.   Musculoskeletal:  "Positive for arthralgias and joint swelling. Negative for neck pain.   Neurological: Negative for weakness and headaches.   Psychiatric/Behavioral: Negative for confusion and dysphoric mood.       Objective:      Physical Exam   Constitutional: She appears well-developed and well-nourished.   HENT:   Right Ear: External ear normal.   Left Ear: External ear normal.   Nose: Nose normal.   Mouth/Throat: Oropharynx is clear and moist.   Eyes: Pupils are equal, round, and reactive to light. Conjunctivae and EOM are normal.   Cardiovascular: Normal rate, regular rhythm, normal heart sounds and intact distal pulses.   Pulmonary/Chest: Effort normal and breath sounds normal.   Lymphadenopathy:     She has no cervical adenopathy.   Neurological: No cranial nerve deficit or sensory deficit.   Skin: Skin is warm and dry.   Psychiatric: She has a normal mood and affect. Her behavior is normal. Judgment and thought content normal.       Vitals:    05/29/20 1318   BP: 112/62   BP Location: Left arm   Patient Position: Sitting   BP Method: Large (Manual)   Pulse: 60   Resp: 20   Temp: 98.2 °F (36.8 °C)   TempSrc: Oral   SpO2: 95%   Weight: 88.4 kg (194 lb 14.2 oz)   Height: 5' 5" (1.651 m)     Body mass index is 32.43 kg/m².    RESULTS: Reviewed labs from last 12 months    Assessment:       1. Annual physical exam    2. Dysuria    3. DDD (degenerative disc disease), cervical        Plan:   Alyce was seen today for annual exam.    Diagnoses and all orders for this visit:    Annual physical exam    Last lab work reviewed. Will need to think at shingle vaccine.  Will need to work on lifestyle.    Dysuria  -     sulfamethoxazole-trimethoprim 800-160mg (BACTRIM DS) 800-160 mg Tab; Take 1 tablet by mouth 2 (two) times daily. for 3 days  -     phenazopyridine (PYRIDIUM) 200 MG tablet; Take 1 tablet (200 mg total) by mouth 3 (three) times daily as needed for Pain.  -     Ambulatory referral/consult to Urology; Future    Pt with remote H/o " surgery for endometriosis, reports issues to urinate. No UTI on lab work done last, but reports new symptoms. I will give her a new course of Bactrim that she states helped a lot last time, with Pyridium, and refer to Urologist.    DDD (degenerative disc disease), cervical    Severe, stated to be invalidating.     Follow up in about 3 months (around 8/29/2020) for f-up.

## 2020-05-29 NOTE — TELEPHONE ENCOUNTER
----- Message from Lata Da Silva sent at 5/29/2020  2:38 PM CDT -----  Contact: lane   Type: Patient Call Back    Who called:lane @ [facility] pharmacy    What is the request in detail: is antibiotic for 3 or 5 days?sulfamethoxazole-trimethoprim 800-160mg (BACTRIM DS) 800-160 mg Tab    Can the clinic reply by [facility]?no    Would the patient rather a call back or a response via [facility]?call    Best call back number:

## 2020-05-29 NOTE — LETTER
May 29, 2020      [facility]  [address]  [place] [address]  Phone: [phone]  Fax: [phone]       Patient: Alyce Zurita   YOB: 1960  Date of Visit: 05/29/2020    To Whom It May Concern:    Adwoa Zurita  was at [facility] on 05/29/2020. If you have any questions or concerns, or if I can be of further assistance, please do not hesitate to contact me.    Sincerely,    Toshia Cintron MD

## 2020-06-10 ENCOUNTER — OFFICE VISIT (OUTPATIENT)
Dept: PAIN MEDICINE | Facility: CLINIC | Age: 60
End: 2020-06-10
Payer: COMMERCIAL

## 2020-06-10 ENCOUNTER — TELEPHONE (OUTPATIENT)
Dept: PAIN MEDICINE | Facility: CLINIC | Age: 60
End: 2020-06-10

## 2020-06-10 VITALS
BODY MASS INDEX: 32.25 KG/M2 | HEART RATE: 57 BPM | OXYGEN SATURATION: 96 % | WEIGHT: 193.56 LBS | TEMPERATURE: 98 F | SYSTOLIC BLOOD PRESSURE: 133 MMHG | DIASTOLIC BLOOD PRESSURE: 64 MMHG | HEIGHT: 65 IN

## 2020-06-10 DIAGNOSIS — M25.562 CHRONIC PAIN OF LEFT KNEE: Primary | ICD-10-CM

## 2020-06-10 DIAGNOSIS — G89.29 CHRONIC PAIN OF LEFT KNEE: Primary | ICD-10-CM

## 2020-06-10 PROCEDURE — 3075F PR MOST RECENT SYSTOLIC BLOOD PRESS GE 130-139MM HG: ICD-10-PCS | Mod: CPTII,S$GLB,, | Performed by: REGISTERED NURSE

## 2020-06-10 PROCEDURE — 99213 OFFICE O/P EST LOW 20 MIN: CPT | Mod: 25,S$GLB,, | Performed by: REGISTERED NURSE

## 2020-06-10 PROCEDURE — 3008F PR BODY MASS INDEX (BMI) DOCUMENTED: ICD-10-PCS | Mod: CPTII,S$GLB,, | Performed by: REGISTERED NURSE

## 2020-06-10 PROCEDURE — 99213 PR OFFICE/OUTPT VISIT, EST, LEVL III, 20-29 MIN: ICD-10-PCS | Mod: 25,S$GLB,, | Performed by: REGISTERED NURSE

## 2020-06-10 PROCEDURE — 3078F PR MOST RECENT DIASTOLIC BLOOD PRESSURE < 80 MM HG: ICD-10-PCS | Mod: CPTII,S$GLB,, | Performed by: REGISTERED NURSE

## 2020-06-10 PROCEDURE — 3075F SYST BP GE 130 - 139MM HG: CPT | Mod: CPTII,S$GLB,, | Performed by: REGISTERED NURSE

## 2020-06-10 PROCEDURE — 20610 DRAIN/INJ JOINT/BURSA W/O US: CPT | Mod: LT,S$GLB,, | Performed by: REGISTERED NURSE

## 2020-06-10 PROCEDURE — 3078F DIAST BP <80 MM HG: CPT | Mod: CPTII,S$GLB,, | Performed by: REGISTERED NURSE

## 2020-06-10 PROCEDURE — 20610 PR DRAIN/INJECT LARGE JOINT/BURSA: ICD-10-PCS | Mod: LT,S$GLB,, | Performed by: REGISTERED NURSE

## 2020-06-10 PROCEDURE — 3008F BODY MASS INDEX DOCD: CPT | Mod: CPTII,S$GLB,, | Performed by: REGISTERED NURSE

## 2020-06-10 PROCEDURE — 99999 PR PBB SHADOW E&M-EST. PATIENT-LVL IV: ICD-10-PCS | Mod: PBBFAC,,, | Performed by: REGISTERED NURSE

## 2020-06-10 PROCEDURE — 99999 PR PBB SHADOW E&M-EST. PATIENT-LVL IV: CPT | Mod: PBBFAC,,, | Performed by: REGISTERED NURSE

## 2020-06-10 RX ORDER — TRIAMCINOLONE ACETONIDE 40 MG/ML
40 INJECTION, SUSPENSION INTRA-ARTICULAR; INTRAMUSCULAR
Status: COMPLETED | OUTPATIENT
Start: 2020-06-10 | End: 2020-06-10

## 2020-06-10 RX ADMIN — TRIAMCINOLONE ACETONIDE 40 MG: 40 INJECTION, SUSPENSION INTRA-ARTICULAR; INTRAMUSCULAR at 10:06

## 2020-06-10 NOTE — TELEPHONE ENCOUNTER
----- Message from Sandra Krishna sent at 6/10/2020  8:09 AM CDT -----  Contact: Patient   Type:  Same Day Appointment Request    Caller is requesting a same day appointment.  Caller declined first available   appointment listed below.      Name of Caller:  Patient     When is the first available appointment? 7/13/2020    Symptoms:  Knee pains       Would the patient rather a call back or a response via [facility]? Call back       Best Call Back Number:  [phone]

## 2020-06-10 NOTE — LETTER
Preethi 10, 2020      [facility]  [address]  [place] [address]  Phone: [phone]  Fax: [phone]       Patient: Alyce Zurita   YOB: 1960  Date of Visit: 06/10/2020    To Whom It May Concern:    Adwoa Zurita  was at [facility] on 06/10/2020. She may return to work/school today with no restrictions. If you have any questions or concerns, or if I can be of further assistance, please do not hesitate to contact me.    Sincerely,    Anay Le RN
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
## 2020-07-09 NOTE — TELEPHONE ENCOUNTER
Pt states that she never received her last refill. Spoke with [facility] pharmacy, states that pt picked up meds 06/20/020 at 1:14 pm. Pt informed, states that she didn't and that she need it.

## 2020-07-10 RX ORDER — HYDROCHLOROTHIAZIDE 12.5 MG/1
12.5 CAPSULE ORAL DAILY
Qty: 90 CAPSULE | Refills: 1 | Status: SHIPPED | OUTPATIENT
Start: 2020-07-10 | End: 2020-11-01 | Stop reason: SDUPTHER

## 2020-07-16 ENCOUNTER — OFFICE VISIT (OUTPATIENT)
Dept: UROLOGY | Facility: CLINIC | Age: 60
End: 2020-07-16
Payer: COMMERCIAL

## 2020-07-16 VITALS — BODY MASS INDEX: 31.67 KG/M2 | TEMPERATURE: 97 F | HEIGHT: 66 IN | WEIGHT: 197.06 LBS

## 2020-07-16 DIAGNOSIS — R35.0 URINARY FREQUENCY: ICD-10-CM

## 2020-07-16 DIAGNOSIS — R10.2 PELVIC PAIN IN FEMALE: Primary | ICD-10-CM

## 2020-07-16 DIAGNOSIS — N39.41 URGENCY INCONTINENCE: ICD-10-CM

## 2020-07-16 DIAGNOSIS — R30.0 DYSURIA: ICD-10-CM

## 2020-07-16 LAB
BILIRUB SERPL-MCNC: NORMAL MG/DL
BLOOD URINE, POC: NORMAL
COLOR, POC UA: YELLOW
GLUCOSE UR QL STRIP: NORMAL
KETONES UR QL STRIP: NORMAL
LEUKOCYTE ESTERASE URINE, POC: NORMAL
NITRITE, POC UA: NORMAL
PH, POC UA: 5
PROTEIN, POC: NORMAL
SPECIFIC GRAVITY, POC UA: 1000
UROBILINOGEN, POC UA: NORMAL

## 2020-07-16 PROCEDURE — 81001 POCT URINALYSIS, DIPSTICK OR TABLET REAGENT, AUTOMATED, WITH MICROSCOP: ICD-10-PCS | Mod: S$GLB,,, | Performed by: UROLOGY

## 2020-07-16 PROCEDURE — 3008F PR BODY MASS INDEX (BMI) DOCUMENTED: ICD-10-PCS | Mod: CPTII,S$GLB,, | Performed by: UROLOGY

## 2020-07-16 PROCEDURE — 99204 OFFICE O/P NEW MOD 45 MIN: CPT | Mod: 25,S$GLB,, | Performed by: UROLOGY

## 2020-07-16 PROCEDURE — 99204 PR OFFICE/OUTPT VISIT, NEW, LEVL IV, 45-59 MIN: ICD-10-PCS | Mod: 25,S$GLB,, | Performed by: UROLOGY

## 2020-07-16 PROCEDURE — 81001 URINALYSIS AUTO W/SCOPE: CPT | Mod: S$GLB,,, | Performed by: UROLOGY

## 2020-07-16 PROCEDURE — 87086 URINE CULTURE/COLONY COUNT: CPT

## 2020-07-16 PROCEDURE — 3008F BODY MASS INDEX DOCD: CPT | Mod: CPTII,S$GLB,, | Performed by: UROLOGY

## 2020-07-16 PROCEDURE — 99999 PR PBB SHADOW E&M-EST. PATIENT-LVL IV: CPT | Mod: PBBFAC,,, | Performed by: UROLOGY

## 2020-07-16 PROCEDURE — 99999 PR PBB SHADOW E&M-EST. PATIENT-LVL IV: ICD-10-PCS | Mod: PBBFAC,,, | Performed by: UROLOGY

## 2020-07-16 NOTE — LETTER
July 16, 2020      Toshia Cintron MD  [address] [place] [address]           [facility]  [address]  [place] [address]  Phone: [phone]  Fax: [phone]          Patient: Alyce Zurita   MR Number: [id number]   YOB: 1960   Date of Visit: 7/16/2020       Dear Dr. Toshia Cintron:    Thank you for referring Alyce Zurita to me for evaluation. Attached you will find relevant portions of my assessment and plan of care.    If you have questions, please do not hesitate to call me. I look forward to following Alyce Zurita along with you.    Sincerely,    ROMI Ribera MD    Enclosure  CC:  No Recipients    If you would like to receive this communication electronically, please contact [email or web address] or [phone] to request more information on [employer] Link access.    For providers and/or their staff who would like to refer a patient to [facility], please contact us through our one-stop-shop provider referral line, [facility], at [phone].    If you feel you have received this communication in error or would no longer like to receive these types of communications, please e-mail [email or web address]

## 2020-07-16 NOTE — PROGRESS NOTES
Subjective:       Patient ID: Alyce Zurita is a 59 y.o. female who was referred by Toshia Cintron MD    Chief Complaint:   Chief Complaint   Patient presents with    Dysuria     new pt coming in for bladder pain over a year        Pelvic Pain  She describes left lower quadrant pain for the past year.  It occurs almost every day.  It is sharp and intense.  She does a lot of physical activity with lifting and bending at work.  She denies hematuria.  She feels that she has had a constant infection for the past year.  She may have pneumaturia.  She describes issues with a complicated total hysterectomy about 30 years ago for endometriosis.  She had issues with her bladder that was opened and required repaired.  She required many checks and antibiotics at the time as well as weekly catheterizations.  , vaginal.    She has been seeing gastroenterology for the LLQ pain but she did not follow up after imaging.  She had a colonoscopy several months ago, she had diverticulosis and polyps.      ACTIVE MEDICAL ISSUES:  Patient Active Problem List   Diagnosis    Chronic neck pain    Primary insomnia    Obesity (BMI 30.0-34.9)    Carpal tunnel syndrome of left wrist    Wrist pain, chronic, left    Anxiety    Coronary artery disease involving native coronary artery of native heart without angina pectoris    Abnormal nuclear stress test    Chronic sinusitis    Cervical spondylosis    DDD (degenerative disc disease), cervical    Myofascial pain    Chronic pain of left knee    Right hand pain       PAST MEDICAL HISTORY  Past Medical History:   Diagnosis Date    Allergy     Anticoagulant long-term use     Anxiety 2018    Arthritis     Breast injury     left 10 yrs ago/ hit in chest    Carpal tunnel syndrome of left wrist 9/10/2018    GERD (gastroesophageal reflux disease)     Hyperlipemia     Hypertension, uncontrolled 2018    Interstitial cystitis     Kidney problem     Meningitis     3  months old    PONV (postoperative nausea and vomiting)     pt needs meds for PONV    Tachycardia        PAST SURGICAL HISTORY:  Past Surgical History:   Procedure Laterality Date    APPENDECTOMY      CARDIAC SURGERY      ABLATION    EPIDURAL STEROID INJECTION Left 1/8/2020    Procedure: Cervical Medial Branch Blocks;  Surgeon: Chaitanya Luna Jr., MD;  Location: [facility] ENDO;  Service: Pain Management;  Laterality: Left;  Left C4, C5, C6 MBB    Arrive @ 0945; ASA and Plavix last 12/31; No DM; NO Sedation    EPIDURAL STEROID INJECTION Left 1/22/2020    Procedure: Cervical Medial Branch Blocks;  Surgeon: Chaitanya Luna Jr., MD;  Location: [facility] ENDO;  Service: Pain Management;  Laterality: Left;  Left C4-6 MBB    Arrive @ 1015; NO Sedation; ASA/Plavix last 1/14; No DM    EPIDURAL STEROID INJECTION Left 2/12/2020    Procedure: Cervical Radiofrequency Thermocoagulation of Medial Branches;  Surgeon: Chaitanya Luna Jr., MD;  Location: [facility] ENDO;  Service: Pain Management;  Laterality: Left;  Left C4-6 RFA    Arrive @ 0730; IV Sedation; ASA/Plavix last doses 2/4/20; No DM    Heart Procedure       HYSTERECTOMY  1993    KIDNEY SURGERY      LEFT HEART CATHETERIZATION Left 2/25/2019    Procedure: Left heart cath 12pm start, R rad access;  Surgeon: Chaitanya Angela MD;  Location: [facility] CATH LAB;  Service: Cardiology;  Laterality: Left;  RN PREOP 2/20/19  ARRIVAL TIME 10AM    OOPHORECTOMY Bilateral 1993    TEMPOROMANDIBULAR JOINT SURGERY         SOCIAL HISTORY:  Social History     Tobacco Use    Smoking status: Never Smoker    Smokeless tobacco: Never Used   Substance Use Topics    Alcohol use: No     Frequency: Monthly or less     Drinks per session: 1 or 2     Binge frequency: Never    Drug use: No       FAMILY HISTORY:  Family History   Problem Relation Age of Onset    Arthritis Mother     Glaucoma Mother     Breast cancer Mother     Asthma Daughter     Breast cancer Maternal Aunt     Breast  cancer Maternal Grandmother     Breast cancer Paternal Grandmother     Breast cancer Maternal Aunt        ALLERGIES AND MEDICATIONS: updated and reviewed.  Review of patient's allergies indicates:   Allergen Reactions    Other omega-3s Nausea And Vomiting     chlorine    Rofecoxib Nausea And Vomiting     Current Outpatient Medications   Medication Sig    aspirin (ECOTRIN) 81 MG EC tablet Take 1 tablet (81 mg total) by mouth once daily.    cholecalciferol, vitamin D3, (VITAMIN D3 ORAL) Take by mouth 2 (two) times daily.    dextromethorphan-guaifenesin  mg (MUCINEX DM)  mg per 12 hr tablet Take 1 tablet by mouth every 12 (twelve) hours.    dicyclomine (BENTYL) 20 mg tablet Take 1 tablet by mouth twice daily    fluconazole (DIFLUCAN) 150 MG Tab     FLUoxetine 20 MG capsule Take 1 capsule (20 mg total) by mouth every morning.    gabapentin (NEURONTIN) 300 MG capsule Take 2 tablet in the morning and 2 tablet nightly    glucosamine/chondr gray A sod (OSTEO BI-FLEX ORAL) Take by mouth 2 (two) times daily.    hydroCHLOROthiazide (MICROZIDE) 12.5 mg capsule Take 1 capsule (12.5 mg total) by mouth once daily.    meclizine (ANTIVERT) 25 mg tablet Take 1 tablet (25 mg total) by mouth 3 (three) times daily as needed.    metoprolol tartrate (LOPRESSOR) 25 MG tablet Take 1 tablet (25 mg total) by mouth once daily.    montelukast (SINGULAIR) 10 mg tablet Take 1 tablet (10 mg total) by mouth every evening.    omeprazole (PRILOSEC) 40 MG capsule TAKE 1 CAPSULE BY MOUTH TWICE DAILY BEFORE  MEALS    tiZANidine (ZANAFLEX) 4 MG tablet Take 1 tablet (4 mg total) by mouth every 8 (eight) hours.    traZODone (DESYREL) 150 MG tablet Take 0.5 tablets (75 mg total) by mouth every evening.    clopidogrel (PLAVIX) 75 mg tablet     fluticasone (FLONASE) 50 mcg/actuation nasal spray 2 sprays by Each Nare route once daily.    nitroGLYCERIN (NITROSTAT) 0.4 MG SL tablet Place 0.4 mg under the tongue.    traMADol  "(ULTRAM) 50 mg tablet Take 50 mg by mouth.     No current facility-administered medications for this visit.        Review of Systems   Constitutional: Negative for activity change, fatigue, fever and unexpected weight change.   Eyes: Negative for redness and visual disturbance.   Respiratory: Negative for chest tightness and shortness of breath.    Cardiovascular: Negative for chest pain and leg swelling.   Gastrointestinal: Negative for abdominal distention, abdominal pain, constipation, diarrhea, nausea and vomiting.   Genitourinary: Negative for difficulty urinating, dysuria, flank pain, frequency, hematuria, pelvic pain, urgency and vaginal bleeding.   Musculoskeletal: Negative for arthralgias and joint swelling.   Neurological: Negative for dizziness, weakness and headaches.   Psychiatric/Behavioral: Negative for confusion. The patient is not nervous/anxious.    All other systems reviewed and are negative.      Objective:      Vitals:    07/16/20 0926   Temp: 96.6 °F (35.9 °C)   Weight: 89.4 kg (197 lb 1.5 oz)   Height: 5' 6" (1.676 m)     Physical Exam   Nursing note and vitals reviewed.  Constitutional: She is oriented to person, place, and time. She appears well-developed.   HENT:   Head: Normocephalic.   Eyes: Conjunctivae are normal.   Neck: Normal range of motion. No tracheal deviation present. No thyromegaly present.   Cardiovascular: Normal rate, normal heart sounds and normal pulses.    Pulmonary/Chest: Effort normal and breath sounds normal. No respiratory distress. She has no wheezes.   Abdominal: Soft. She exhibits no distension and no mass. There is no abdominal tenderness. There is no rebound and no guarding. No hernia.   Musculoskeletal: Normal range of motion. No tenderness.   Lymphadenopathy:     She has no cervical adenopathy.   Neurological: She is alert and oriented to person, place, and time.   Skin: Skin is warm and dry. No rash noted. No erythema.     Psychiatric: Her behavior is normal. " Judgment and thought content normal.       Urine dipstick shows negative for all components.  Micro exam: negative for WBC's or RBC's.    Assessment:       1. Pelvic pain in female    2. Dysuria    3. Urinary frequency    4. Urgency incontinence          Plan:       1. Dysuria    - Ambulatory referral/consult to Urology  - POCT urinalysis, dipstick or tablet reag  - CT Urogram Abd Pelvis W WO; Future    2. Pelvic pain in female    - Urine culture  - Cystoscopy; Future  - Basic metabolic panel; Future    3. Urinary frequency  Avoid bladder irritants    4. Urgency incontinence  Consider urodynamics            No follow-ups on file.

## 2020-07-18 LAB — BACTERIA UR CULT: NORMAL

## 2020-07-19 DIAGNOSIS — K21.9 GASTROESOPHAGEAL REFLUX DISEASE, ESOPHAGITIS PRESENCE NOT SPECIFIED: ICD-10-CM

## 2020-07-19 RX ORDER — OMEPRAZOLE 40 MG/1
40 CAPSULE, DELAYED RELEASE ORAL
Qty: 180 CAPSULE | Refills: 0 | Status: CANCELLED | OUTPATIENT
Start: 2020-07-19

## 2020-07-21 DIAGNOSIS — F51.01 PRIMARY INSOMNIA: ICD-10-CM

## 2020-07-21 RX ORDER — TRAZODONE HYDROCHLORIDE 150 MG/1
75 TABLET ORAL NIGHTLY
Qty: 45 TABLET | Refills: 1 | Status: SHIPPED | OUTPATIENT
Start: 2020-07-21 | End: 2021-01-15 | Stop reason: SDUPTHER

## 2020-07-21 NOTE — TELEPHONE ENCOUNTER
Last Office Visit Info:   The patient's last visit with Toshia Cintron MD was on 5/29/2020.    The patient's last visit in current department was on 5/29/2020.        Last CBC Results:   Lab Results   Component Value Date    WBC 9.74 05/19/2020    HGB 12.3 05/19/2020    HCT 41.0 05/19/2020     05/19/2020       Last CMP Results  Lab Results   Component Value Date     05/19/2020    K 4.2 05/19/2020     05/19/2020    CO2 25 05/19/2020    BUN 16 05/19/2020    CREATININE 0.8 05/19/2020    CALCIUM 9.2 05/19/2020    ALBUMIN 3.8 05/19/2020    AST 19 05/19/2020    ALT 17 05/19/2020       Last Lipids  Lab Results   Component Value Date    CHOL 215 (H) 05/19/2020    TRIG 133 05/19/2020    HDL 45 05/19/2020    LDLCALC 143.4 05/19/2020       Last A1C  Lab Results   Component Value Date    HGBA1C 5.4 08/23/2018       Last TSH  Lab Results   Component Value Date    TSH 1.319 11/27/2018         Current Med Refills  Medication List with Changes/Refills   Current Medications    ASPIRIN (ECOTRIN) 81 MG EC TABLET    Take 1 tablet (81 mg total) by mouth once daily.       Start Date: 2/4/2019  End Date: --    CHOLECALCIFEROL, VITAMIN D3, (VITAMIN D3 ORAL)    Take by mouth 2 (two) times daily.       Start Date: --        End Date: --    CLOPIDOGREL (PLAVIX) 75 MG TABLET           Start Date: 10/28/2019End Date: --    DEXTROMETHORPHAN-GUAIFENESIN  MG (MUCINEX DM)  MG PER 12 HR TABLET    Take 1 tablet by mouth every 12 (twelve) hours.       Start Date: --        End Date: --    DICYCLOMINE (BENTYL) 20 MG TABLET    Take 1 tablet by mouth twice daily       Start Date: 6/29/2020 End Date: --    FLUCONAZOLE (DIFLUCAN) 150 MG TAB           Start Date: 10/11/2019End Date: --    FLUOXETINE 20 MG CAPSULE    Take 1 capsule (20 mg total) by mouth every morning.       Start Date: 4/23/2020 End Date: --    FLUTICASONE (FLONASE) 50 MCG/ACTUATION NASAL SPRAY    2 sprays by Each Nare route once daily.       Start Date:  --        End Date: --    GABAPENTIN (NEURONTIN) 300 MG CAPSULE    Take 2 tablet in the morning and 2 tablet nightly       Start Date: 7/12/2019 End Date: --    GLUCOSAMINE/CHONDR QUINTANILLA A SOD (OSTEO BI-FLEX ORAL)    Take by mouth 2 (two) times daily.       Start Date: --        End Date: --    HYDROCHLOROTHIAZIDE (MICROZIDE) 12.5 MG CAPSULE    Take 1 capsule (12.5 mg total) by mouth once daily.       Start Date: 7/10/2020 End Date: --    MECLIZINE (ANTIVERT) 25 MG TABLET    Take 1 tablet (25 mg total) by mouth 3 (three) times daily as needed.       Start Date: 2/28/2020 End Date: --    METOPROLOL TARTRATE (LOPRESSOR) 25 MG TABLET    Take 1 tablet (25 mg total) by mouth once daily.       Start Date: 3/24/2020 End Date: --    MONTELUKAST (SINGULAIR) 10 MG TABLET    Take 1 tablet (10 mg total) by mouth every evening.       Start Date: 3/24/2020 End Date: --    NITROGLYCERIN (NITROSTAT) 0.4 MG SL TABLET    Place 0.4 mg under the tongue.       Start Date: 9/8/2014  End Date: --    OMEPRAZOLE (PRILOSEC) 40 MG CAPSULE    TAKE 1 CAPSULE BY MOUTH TWICE DAILY BEFORE  MEALS       Start Date: 7/10/2020 End Date: --    TIZANIDINE (ZANAFLEX) 4 MG TABLET    Take 1 tablet (4 mg total) by mouth every 8 (eight) hours.       Start Date: 5/22/2020 End Date: 5/22/2021    TRAMADOL (ULTRAM) 50 MG TABLET    Take 50 mg by mouth.       Start Date: --        End Date: --    TRAZODONE (DESYREL) 150 MG TABLET    Take 0.5 tablets (75 mg total) by mouth every evening.       Start Date: 3/29/2020 End Date: 3/29/2021       Order(s) placed per written order guidelines:     Please advise.

## 2020-07-25 DIAGNOSIS — F41.9 ANXIETY: ICD-10-CM

## 2020-07-25 RX ORDER — FLUOXETINE HYDROCHLORIDE 20 MG/1
20 CAPSULE ORAL EVERY MORNING
Qty: 90 CAPSULE | Refills: 1 | Status: CANCELLED | OUTPATIENT
Start: 2020-07-25

## 2020-07-27 ENCOUNTER — OFFICE VISIT (OUTPATIENT)
Dept: FAMILY MEDICINE | Facility: CLINIC | Age: 60
End: 2020-07-27
Payer: COMMERCIAL

## 2020-07-27 ENCOUNTER — HOSPITAL ENCOUNTER (OUTPATIENT)
Dept: RADIOLOGY | Facility: HOSPITAL | Age: 60
Discharge: HOME | End: 2020-07-27
Attending: UROLOGY
Payer: COMMERCIAL

## 2020-07-27 VITALS
TEMPERATURE: 99 F | OXYGEN SATURATION: 95 % | HEART RATE: 68 BPM | HEIGHT: 66 IN | RESPIRATION RATE: 16 BRPM | DIASTOLIC BLOOD PRESSURE: 68 MMHG | SYSTOLIC BLOOD PRESSURE: 120 MMHG | BODY MASS INDEX: 31.71 KG/M2 | WEIGHT: 197.31 LBS

## 2020-07-27 DIAGNOSIS — M54.42 CHRONIC LEFT-SIDED LOW BACK PAIN WITH LEFT-SIDED SCIATICA: Primary | ICD-10-CM

## 2020-07-27 DIAGNOSIS — R10.9 FUNCTIONAL ABDOMINAL PAIN SYNDROME: ICD-10-CM

## 2020-07-27 DIAGNOSIS — G89.29 CHRONIC LEFT-SIDED LOW BACK PAIN WITH LEFT-SIDED SCIATICA: Primary | ICD-10-CM

## 2020-07-27 DIAGNOSIS — M54.2 CHRONIC NECK PAIN: ICD-10-CM

## 2020-07-27 DIAGNOSIS — G89.29 CHRONIC NECK PAIN: ICD-10-CM

## 2020-07-27 DIAGNOSIS — R30.0 DYSURIA: ICD-10-CM

## 2020-07-27 PROCEDURE — 74178 CT UROGRAM ABD PELVIS W WO: ICD-10-PCS | Mod: 26,,, | Performed by: RADIOLOGY

## 2020-07-27 PROCEDURE — 3008F PR BODY MASS INDEX (BMI) DOCUMENTED: ICD-10-PCS | Mod: CPTII,S$GLB,, | Performed by: INTERNAL MEDICINE

## 2020-07-27 PROCEDURE — 3074F SYST BP LT 130 MM HG: CPT | Mod: CPTII,S$GLB,, | Performed by: INTERNAL MEDICINE

## 2020-07-27 PROCEDURE — 74178 CT ABD&PLV WO CNTR FLWD CNTR: CPT | Mod: TC

## 2020-07-27 PROCEDURE — 99999 PR PBB SHADOW E&M-EST. PATIENT-LVL III: ICD-10-PCS | Mod: PBBFAC,,, | Performed by: INTERNAL MEDICINE

## 2020-07-27 PROCEDURE — 25500020 PHARM REV CODE 255: Performed by: UROLOGY

## 2020-07-27 PROCEDURE — 3078F DIAST BP <80 MM HG: CPT | Mod: CPTII,S$GLB,, | Performed by: INTERNAL MEDICINE

## 2020-07-27 PROCEDURE — 99214 PR OFFICE/OUTPT VISIT, EST, LEVL IV, 30-39 MIN: ICD-10-PCS | Mod: S$GLB,,, | Performed by: INTERNAL MEDICINE

## 2020-07-27 PROCEDURE — 3078F PR MOST RECENT DIASTOLIC BLOOD PRESSURE < 80 MM HG: ICD-10-PCS | Mod: CPTII,S$GLB,, | Performed by: INTERNAL MEDICINE

## 2020-07-27 PROCEDURE — 3008F BODY MASS INDEX DOCD: CPT | Mod: CPTII,S$GLB,, | Performed by: INTERNAL MEDICINE

## 2020-07-27 PROCEDURE — 99214 OFFICE O/P EST MOD 30 MIN: CPT | Mod: S$GLB,,, | Performed by: INTERNAL MEDICINE

## 2020-07-27 PROCEDURE — 3074F PR MOST RECENT SYSTOLIC BLOOD PRESSURE < 130 MM HG: ICD-10-PCS | Mod: CPTII,S$GLB,, | Performed by: INTERNAL MEDICINE

## 2020-07-27 PROCEDURE — 99999 PR PBB SHADOW E&M-EST. PATIENT-LVL III: CPT | Mod: PBBFAC,,, | Performed by: INTERNAL MEDICINE

## 2020-07-27 PROCEDURE — 74178 CT ABD&PLV WO CNTR FLWD CNTR: CPT | Mod: 26,,, | Performed by: RADIOLOGY

## 2020-07-27 RX ORDER — DICYCLOMINE HYDROCHLORIDE 20 MG/1
20 TABLET ORAL 2 TIMES DAILY
Qty: 60 TABLET | Refills: 0 | Status: SHIPPED | OUTPATIENT
Start: 2020-07-27 | End: 2020-08-31

## 2020-07-27 RX ORDER — GABAPENTIN 300 MG/1
CAPSULE ORAL
Qty: 360 CAPSULE | Refills: 3 | Status: SHIPPED | OUTPATIENT
Start: 2020-07-27 | End: 2020-07-31

## 2020-07-27 RX ORDER — DICYCLOMINE HYDROCHLORIDE 20 MG/1
20 TABLET ORAL 2 TIMES DAILY
Qty: 60 TABLET | Refills: 0 | Status: CANCELLED | OUTPATIENT
Start: 2020-07-27

## 2020-07-27 RX ORDER — TRAMADOL HYDROCHLORIDE 50 MG/1
50 TABLET ORAL EVERY 8 HOURS PRN
Qty: 21 TABLET | Refills: 0 | Status: SHIPPED | OUTPATIENT
Start: 2020-07-27 | End: 2020-09-25

## 2020-07-27 RX ORDER — FLUOXETINE HYDROCHLORIDE 20 MG/1
20 CAPSULE ORAL EVERY MORNING
Qty: 90 CAPSULE | Refills: 3 | Status: SHIPPED | OUTPATIENT
Start: 2020-07-27 | End: 2021-08-03

## 2020-07-27 RX ADMIN — IOHEXOL 125 ML: 350 INJECTION, SOLUTION INTRAVENOUS at 10:07

## 2020-07-27 NOTE — PROGRESS NOTES
Subjective:       Patient ID: Alyce Zurita is a pleasant 60 y.o. White female patient    Chief Complaint: Spasms (back, started yesterday)      Patient is a pt known from me, see my last notes and the list of problems below.     HPI     She comes today due to worsening lower back pain that started after her bending and twisting to  things on the ground yesterday. She developed pain L lumbar back, radiating in the buttock. Rated 10/10. Worse when she stands up. She tried to take APAP (states she She does report weakness or numbness of LE. She cannot take NSAIDs as had diverticulitis) and used heating pads.  It is not the first episode. She also suffers from cervical pain and had f-up by Dr. Luna.  She had CT urogram this AM for investigation blood in the urines and will have cystoscopy next month.    Patient Active Problem List   Diagnosis    Chronic neck pain    Primary insomnia    Obesity (BMI 30.0-34.9)    Carpal tunnel syndrome of left wrist    Wrist pain, chronic, left    Anxiety    Coronary artery disease involving native coronary artery of native heart without angina pectoris    Abnormal nuclear stress test    Chronic sinusitis    Cervical spondylosis    DDD (degenerative disc disease), cervical    Myofascial pain    Chronic pain of left knee    Right hand pain          ACTIVE MEDICAL ISSUES:  Documented in Problem List     PAST MEDICAL HISTORY  Documented     PAST SURGICAL HISTORY:  Documented     SOCIAL HISTORY:  Documented     FAMILY HISTORY:  Documented     ALLERGIES AND MEDICATIONS: updated and reviewed.  Documented    Review of Systems   Constitutional: Negative.    HENT: Negative.    Respiratory: Negative.    Cardiovascular: Negative.    Gastrointestinal: Negative.    Musculoskeletal: Positive for back pain.       Objective:      Physical Exam  Vitals signs and nursing note reviewed.   Constitutional:       Appearance: She is obese.   HENT:      Head: Normocephalic and  "atraumatic.   Neck:      Musculoskeletal: Normal range of motion and neck supple.   Cardiovascular:      Rate and Rhythm: Normal rate and regular rhythm.      Pulses: Normal pulses.      Heart sounds: Normal heart sounds.   Pulmonary:      Effort: Pulmonary effort is normal.      Breath sounds: Normal breath sounds.   Musculoskeletal:      Comments: Stands up with difficulties, pain to palpation of L paravertebral muscles. Gross exam with no weakness or numbness of the LLE   Neurological:      Mental Status: She is alert.         Vitals:    07/27/20 1415   BP: 120/68   BP Location: Right arm   Patient Position: Sitting   BP Method: Large (Manual)   Pulse: 68   Resp: 16   Temp: 99 °F (37.2 °C)   TempSrc: Oral   SpO2: 95%   Weight: 89.5 kg (197 lb 5 oz)   Height: 5' 6" (1.676 m)     Body mass index is 31.85 kg/m².    RESULTS: Reviewed labs from last 12 months    Assessment:       1. Chronic left-sided low back pain with left-sided sciatica    2. Functional abdominal pain syndrome    3. Chronic neck pain        Plan:   Alyce was seen today for spasms.    Diagnoses and all orders for this visit:    Chronic left-sided low back pain with left-sided sciatica  -     Ambulatory referral/consult to Pain Clinic; Future  -     traMADoL (ULTRAM) 50 mg tablet; Take 1 tablet (50 mg total) by mouth every 8 (eight) hours as needed for Pain.    See HPI and PE. Not the first episode, but very invalidated presently. Will refer to her pain specialist. Will order tramadol for 7 days maximum, no refill. Received it in the past with no problem. She will take APAP (she cannot tolerated NSAIDs), and alternate heating pads and ice applications. Advised regarding symptoms or signs of concern that may prompt her to seek for medical attention.    Functional abdominal pain syndrome  -     dicyclomine (BENTYL) 20 mg tablet; Take 1 tablet (20 mg total) by mouth 2 (two) times daily.  -     Ambulatory referral/consult to Pain Clinic; Future    Refill " given.    Chronic neck pain  -     gabapentin (NEURONTIN) 300 MG capsule; Take 2 tablet in the morning and 2 tablet nightly    Refilled gabapentin. Not an acute issue today.    Follow up in about 2 months (around 10/6/2020) for appt instead of September.    This note was created by combination of typed  and M-Modal dictation.  Transcription errors may be present.  If there are any questions, please contact me.

## 2020-07-27 NOTE — LETTER
August 5, 2020      [facility]  [address]  [place] [address]  Phone: [phone]  Fax: [phone]       Patient: Alyce Zurita   YOB: 1960  Date of Visit: 08/05/2020    To Whom It May Concern:    Adwoa Zurita  was at [facility] on 08/05/2020. She could not go to work today and may return on  08/08/2020 with no restrictions. If you have any questions or concerns, or if I can be of further assistance, please do not hesitate to contact me.    Sincerely,    Toshia Cintron MD

## 2020-07-27 NOTE — LETTER
July 27, 2020      [facility]  [address]  [place] [address]  Phone: [phone]  Fax: [phone]       Patient: Alyce Zurita   YOB: 1960  Date of Visit: 07/27/2020    To Whom It May Concern:    Adwoa Zurita  was at [facility] on 07/27/2020. She may return to work 07/29/2020 on light duty. If you have any questions or concerns, or if I can be of further assistance, please do not hesitate to contact me.    Sincerely,    Toshia Cintron MD

## 2020-07-27 NOTE — LETTER
August 1, 2020      [facility]  [address]  [place] [address]  Phone: [phone]  Fax: [phone]       Patient: Alyce Zurita   YOB: 1960  Date of Visit: 08/01/2020    To Whom It May Concern:    Adwoa Zurita  was at [facility] on 07/27/2020. She can go to work on 08/03/2020 but needs to be on light duty (no work on knees) until 08/05/2020.  If you have any questions or concerns, or if I can be of further assistance, please do not hesitate to contact me.    Sincerely,    Toshia Cintron MD

## 2020-07-28 ENCOUNTER — TELEPHONE (OUTPATIENT)
Dept: PAIN MEDICINE | Facility: CLINIC | Age: 60
End: 2020-07-28

## 2020-07-28 NOTE — TELEPHONE ENCOUNTER
----- Message from Janene gerardoruth sent at 7/28/2020  1:33 PM CDT -----  Type: Patient Call Back    Who called: pt     What is the request in detail: pt asking for a sooner appt than first available on 08/10    Can the clinic reply by [facility]? No     Would the patient rather a call back or a response via [facility]? Call back     Best call back number: [phone]    Additional Information:

## 2020-07-29 ENCOUNTER — HOSPITAL ENCOUNTER (OUTPATIENT)
Dept: RADIOLOGY | Facility: HOSPITAL | Age: 60
Discharge: HOME | End: 2020-07-29
Attending: PAIN MEDICINE
Payer: COMMERCIAL

## 2020-07-29 ENCOUNTER — OFFICE VISIT (OUTPATIENT)
Dept: PAIN MEDICINE | Facility: CLINIC | Age: 60
End: 2020-07-29
Payer: COMMERCIAL

## 2020-07-29 VITALS
HEART RATE: 51 BPM | SYSTOLIC BLOOD PRESSURE: 151 MMHG | DIASTOLIC BLOOD PRESSURE: 72 MMHG | WEIGHT: 198 LBS | TEMPERATURE: 98 F | HEIGHT: 66 IN | OXYGEN SATURATION: 95 % | BODY MASS INDEX: 31.82 KG/M2

## 2020-07-29 DIAGNOSIS — M54.9 DORSALGIA, UNSPECIFIED: ICD-10-CM

## 2020-07-29 DIAGNOSIS — M54.10 RADICULOPATHY OF LEG: ICD-10-CM

## 2020-07-29 DIAGNOSIS — M54.10 RADICULOPATHY OF LEG: Primary | ICD-10-CM

## 2020-07-29 DIAGNOSIS — M48.061 SPINAL STENOSIS OF LUMBAR REGION, UNSPECIFIED WHETHER NEUROGENIC CLAUDICATION PRESENT: ICD-10-CM

## 2020-07-29 PROCEDURE — 3078F PR MOST RECENT DIASTOLIC BLOOD PRESSURE < 80 MM HG: ICD-10-PCS | Mod: CPTII,S$GLB,, | Performed by: REGISTERED NURSE

## 2020-07-29 PROCEDURE — 99214 OFFICE O/P EST MOD 30 MIN: CPT | Mod: S$GLB,,, | Performed by: REGISTERED NURSE

## 2020-07-29 PROCEDURE — 3008F PR BODY MASS INDEX (BMI) DOCUMENTED: ICD-10-PCS | Mod: CPTII,S$GLB,, | Performed by: REGISTERED NURSE

## 2020-07-29 PROCEDURE — 3077F PR MOST RECENT SYSTOLIC BLOOD PRESSURE >= 140 MM HG: ICD-10-PCS | Mod: CPTII,S$GLB,, | Performed by: REGISTERED NURSE

## 2020-07-29 PROCEDURE — 72148 MRI LUMBAR SPINE W/O DYE: CPT | Mod: 26,,, | Performed by: RADIOLOGY

## 2020-07-29 PROCEDURE — 3077F SYST BP >= 140 MM HG: CPT | Mod: CPTII,S$GLB,, | Performed by: REGISTERED NURSE

## 2020-07-29 PROCEDURE — 72148 MRI LUMBAR SPINE W/O DYE: CPT | Mod: TC

## 2020-07-29 PROCEDURE — 72148 MRI LUMBAR SPINE WITHOUT CONTRAST: ICD-10-PCS | Mod: 26,,, | Performed by: RADIOLOGY

## 2020-07-29 PROCEDURE — 99999 PR PBB SHADOW E&M-EST. PATIENT-LVL V: ICD-10-PCS | Mod: PBBFAC,,, | Performed by: REGISTERED NURSE

## 2020-07-29 PROCEDURE — 3078F DIAST BP <80 MM HG: CPT | Mod: CPTII,S$GLB,, | Performed by: REGISTERED NURSE

## 2020-07-29 PROCEDURE — 3008F BODY MASS INDEX DOCD: CPT | Mod: CPTII,S$GLB,, | Performed by: REGISTERED NURSE

## 2020-07-29 PROCEDURE — 99214 PR OFFICE/OUTPT VISIT, EST, LEVL IV, 30-39 MIN: ICD-10-PCS | Mod: S$GLB,,, | Performed by: REGISTERED NURSE

## 2020-07-29 PROCEDURE — 99999 PR PBB SHADOW E&M-EST. PATIENT-LVL V: CPT | Mod: PBBFAC,,, | Performed by: REGISTERED NURSE

## 2020-07-29 NOTE — LETTER
July 29, 2020      [facility]  [address]  [place] [address]  Phone: [phone]  Fax: [phone]       Patient: Alyce Zurita   YOB: 1960  Date of Visit: 07/29/2020    To Whom It May Concern:    Adwoa Zurita  was at [facility], please excuse her from 07/26/2020 through 07/31/2020. She may return to work/school on 08/01/2020 with no restrictions. If you have any questions or concerns, or if I can be of further assistance, please do not hesitate to contact me.    Sincerely,    Anay Le RN
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
## 2020-07-31 ENCOUNTER — OFFICE VISIT (OUTPATIENT)
Dept: PAIN MEDICINE | Facility: CLINIC | Age: 60
End: 2020-07-31
Payer: COMMERCIAL

## 2020-07-31 ENCOUNTER — TELEPHONE (OUTPATIENT)
Dept: FAMILY MEDICINE | Facility: CLINIC | Age: 60
End: 2020-07-31

## 2020-07-31 ENCOUNTER — TELEPHONE (OUTPATIENT)
Dept: PAIN MEDICINE | Facility: CLINIC | Age: 60
End: 2020-07-31

## 2020-07-31 VITALS
BODY MASS INDEX: 31.67 KG/M2 | HEART RATE: 56 BPM | WEIGHT: 197.06 LBS | SYSTOLIC BLOOD PRESSURE: 146 MMHG | OXYGEN SATURATION: 96 % | HEIGHT: 66 IN | TEMPERATURE: 99 F | DIASTOLIC BLOOD PRESSURE: 68 MMHG

## 2020-07-31 DIAGNOSIS — M47.816 FACET ARTHROPATHY, LUMBAR: ICD-10-CM

## 2020-07-31 DIAGNOSIS — G89.29 CHRONIC NECK PAIN: ICD-10-CM

## 2020-07-31 DIAGNOSIS — M54.10 RADICULOPATHY OF LEG: ICD-10-CM

## 2020-07-31 DIAGNOSIS — M51.26 LUMBAR HERNIATED DISC: ICD-10-CM

## 2020-07-31 DIAGNOSIS — M53.3 SACROILIAC JOINT PAIN: ICD-10-CM

## 2020-07-31 DIAGNOSIS — M51.36 DDD (DEGENERATIVE DISC DISEASE), LUMBAR: Primary | ICD-10-CM

## 2020-07-31 DIAGNOSIS — Z01.818 PRE-OP TESTING: Primary | ICD-10-CM

## 2020-07-31 DIAGNOSIS — M54.2 CHRONIC NECK PAIN: ICD-10-CM

## 2020-07-31 PROBLEM — M48.061 SPINAL STENOSIS OF LUMBAR REGION: Status: RESOLVED | Noted: 2020-07-29 | Resolved: 2020-07-31

## 2020-07-31 PROCEDURE — 99999 PR PBB SHADOW E&M-EST. PATIENT-LVL V: CPT | Mod: PBBFAC,,, | Performed by: REGISTERED NURSE

## 2020-07-31 PROCEDURE — 99999 PR PBB SHADOW E&M-EST. PATIENT-LVL V: ICD-10-PCS | Mod: PBBFAC,,, | Performed by: REGISTERED NURSE

## 2020-07-31 PROCEDURE — 3008F BODY MASS INDEX DOCD: CPT | Mod: CPTII,S$GLB,, | Performed by: REGISTERED NURSE

## 2020-07-31 PROCEDURE — 3078F PR MOST RECENT DIASTOLIC BLOOD PRESSURE < 80 MM HG: ICD-10-PCS | Mod: CPTII,S$GLB,, | Performed by: REGISTERED NURSE

## 2020-07-31 PROCEDURE — 99214 OFFICE O/P EST MOD 30 MIN: CPT | Mod: S$GLB,,, | Performed by: REGISTERED NURSE

## 2020-07-31 PROCEDURE — 3008F PR BODY MASS INDEX (BMI) DOCUMENTED: ICD-10-PCS | Mod: CPTII,S$GLB,, | Performed by: REGISTERED NURSE

## 2020-07-31 PROCEDURE — 3078F DIAST BP <80 MM HG: CPT | Mod: CPTII,S$GLB,, | Performed by: REGISTERED NURSE

## 2020-07-31 PROCEDURE — 99214 PR OFFICE/OUTPT VISIT, EST, LEVL IV, 30-39 MIN: ICD-10-PCS | Mod: S$GLB,,, | Performed by: REGISTERED NURSE

## 2020-07-31 PROCEDURE — 3077F PR MOST RECENT SYSTOLIC BLOOD PRESSURE >= 140 MM HG: ICD-10-PCS | Mod: CPTII,S$GLB,, | Performed by: REGISTERED NURSE

## 2020-07-31 PROCEDURE — 3077F SYST BP >= 140 MM HG: CPT | Mod: CPTII,S$GLB,, | Performed by: REGISTERED NURSE

## 2020-07-31 RX ORDER — GABAPENTIN 300 MG/1
CAPSULE ORAL
Qty: 360 CAPSULE | Refills: 3 | Status: SHIPPED | OUTPATIENT
Start: 2020-07-31 | End: 2021-10-21
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
## 2020-07-31 NOTE — TELEPHONE ENCOUNTER
Reviewed pre-procedure instructions with Ms. Alyce, as well as provided arrival time of 1330.  COVID test scheduled on 8/2/2020 at 1200p at the [facility] on [facility].  All questions answered- patient verbalized understanding.    Patient states she does not consistently take the ASA daily.  She has not taken in about a week. Encouraged her discuss with her cardiologist.

## 2020-07-31 NOTE — TELEPHONE ENCOUNTER
----- Message from Lata Da Silva sent at 7/31/2020  9:36 AM CDT -----  Type: Patient Call Back    Who called:Self    What is the request in detail:pt has wrong info on her paper work from visit this A.M.    Can the clinic reply by [facility]?no    Would the patient rather a call back or a response via [facility]? call    Best call back number:

## 2020-07-31 NOTE — TELEPHONE ENCOUNTER
Pt seen on 7/27, she has paperwork that needs to be completed for her job, do you chencho her to schedule another appointment or can she drop off the paperwork
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
## 2020-07-31 NOTE — PATIENT INSTRUCTIONS
Instructions for taking Gabapentin 300mg Pills    Increase to two 600 mg tablets (1,200 mg) BID (total of 2,400 mg), once in morning and once in evening/at bedtime as tolerated. Please call with any adverse side effects or problems/concerns. Can increase to 3,600 as tolerated.

## 2020-07-31 NOTE — TELEPHONE ENCOUNTER
----- Message from Birgit Crystaler sent at 7/31/2020  9:30 AM CDT -----  Contact: PHIL LOUIS [[id number]]  Type: Patient Call Back    Who called:PHIL LOUIS [[id number]]    What is the request in detail: Patient is requesting a call back. She would like to know how can she get the paperwork to the staff regarding her leave from her job. She states that she needs the doctor to complete the form so she can return it back to work.   Please advise.    Can the clinic reply by [facility]? No    Best call back number: [phone]    Additional Information: N/A

## 2020-07-31 NOTE — TELEPHONE ENCOUNTER
Which leave of job is she talking about? I would probably need to see her back next week to discuss this with her. Thanks

## 2020-08-02 ENCOUNTER — CLINICAL SUPPORT (OUTPATIENT)
Dept: URGENT CARE | Facility: CLINIC | Age: 60
End: 2020-08-02
Payer: COMMERCIAL

## 2020-08-02 DIAGNOSIS — Z01.818 PRE-OP TESTING: ICD-10-CM

## 2020-08-02 PROCEDURE — U0003 INFECTIOUS AGENT DETECTION BY NUCLEIC ACID (DNA OR RNA); SEVERE ACUTE RESPIRATORY SYNDROME CORONAVIRUS 2 (SARS-COV-2) (CORONAVIRUS DISEASE [COVID-19]), AMPLIFIED PROBE TECHNIQUE, MAKING USE OF HIGH THROUGHPUT TECHNOLOGIES AS DESCRIBED BY CMS-2020-01-R: HCPCS

## 2020-08-02 PROCEDURE — 99000 SPECIMEN HANDLING OFFICE-LAB: CPT | Mod: S$GLB,,, | Performed by: PHYSICIAN ASSISTANT

## 2020-08-02 PROCEDURE — 99000 PR SPECIMEN HANDLING,DR OFF->LAB: ICD-10-PCS | Mod: S$GLB,,, | Performed by: PHYSICIAN ASSISTANT

## 2020-08-03 LAB — SARS-COV-2 RNA RESP QL NAA+PROBE: NOT DETECTED

## 2020-08-04 ENCOUNTER — TELEPHONE (OUTPATIENT)
Dept: PAIN MEDICINE | Facility: CLINIC | Age: 60
End: 2020-08-04

## 2020-08-04 NOTE — TELEPHONE ENCOUNTER
----- Message from Nasim García sent at 8/4/2020 11:44 AM CDT -----  Regarding: Procedure Time  Pt called inquiring on time she needs to arrive for for procedure on 7-5-20        [phone] (home)

## 2020-08-05 ENCOUNTER — HOSPITAL ENCOUNTER (OUTPATIENT)
Facility: HOSPITAL | Age: 60
Discharge: HOME | End: 2020-08-05
Attending: PAIN MEDICINE | Admitting: PAIN MEDICINE
Payer: COMMERCIAL

## 2020-08-05 VITALS
OXYGEN SATURATION: 93 % | SYSTOLIC BLOOD PRESSURE: 126 MMHG | RESPIRATION RATE: 18 BRPM | HEART RATE: 74 BPM | TEMPERATURE: 98 F | DIASTOLIC BLOOD PRESSURE: 69 MMHG

## 2020-08-05 DIAGNOSIS — M51.36 DDD (DEGENERATIVE DISC DISEASE), LUMBAR: Primary | ICD-10-CM

## 2020-08-05 PROCEDURE — 64483 PR EPIDURAL INJ, ANES/STEROID, TRANSFORAMINAL, LUMB/SACR, SNGL LEVL: ICD-10-PCS | Mod: 50,,, | Performed by: PAIN MEDICINE

## 2020-08-05 PROCEDURE — 64483 NJX AA&/STRD TFRM EPI L/S 1: CPT | Mod: 50,,, | Performed by: PAIN MEDICINE

## 2020-08-05 PROCEDURE — 25000003 PHARM REV CODE 250: Performed by: PAIN MEDICINE

## 2020-08-05 PROCEDURE — 63600175 PHARM REV CODE 636 W HCPCS: Performed by: PAIN MEDICINE

## 2020-08-05 PROCEDURE — 25500020 PHARM REV CODE 255: Performed by: PAIN MEDICINE

## 2020-08-05 PROCEDURE — 64483 NJX AA&/STRD TFRM EPI L/S 1: CPT | Mod: 50 | Performed by: PAIN MEDICINE

## 2020-08-05 RX ORDER — DEXAMETHASONE SODIUM PHOSPHATE 10 MG/ML
INJECTION INTRAMUSCULAR; INTRAVENOUS
Status: DISCONTINUED
Start: 2020-08-05 | End: 2020-08-05 | Stop reason: HOSPADM

## 2020-08-05 RX ORDER — LIDOCAINE HYDROCHLORIDE 20 MG/ML
INJECTION, SOLUTION INFILTRATION; PERINEURAL
Status: DISCONTINUED
Start: 2020-08-05 | End: 2020-08-05 | Stop reason: HOSPADM

## 2020-08-05 RX ORDER — LIDOCAINE HYDROCHLORIDE 20 MG/ML
10 INJECTION, SOLUTION INFILTRATION; PERINEURAL ONCE
Status: COMPLETED | OUTPATIENT
Start: 2020-08-05 | End: 2020-08-05

## 2020-08-05 RX ORDER — LIDOCAINE HYDROCHLORIDE 10 MG/ML
1 INJECTION, SOLUTION EPIDURAL; INFILTRATION; INTRACAUDAL; PERINEURAL ONCE
Status: COMPLETED | OUTPATIENT
Start: 2020-08-05 | End: 2020-08-05

## 2020-08-05 RX ORDER — ALPRAZOLAM 0.5 MG/1
1 TABLET, ORALLY DISINTEGRATING ORAL ONCE AS NEEDED
Status: COMPLETED | OUTPATIENT
Start: 2020-08-05 | End: 2020-08-05

## 2020-08-05 RX ORDER — DEXAMETHASONE SODIUM PHOSPHATE 10 MG/ML
10 INJECTION INTRAMUSCULAR; INTRAVENOUS ONCE
Status: COMPLETED | OUTPATIENT
Start: 2020-08-05 | End: 2020-08-05

## 2020-08-05 RX ORDER — ALPRAZOLAM 0.5 MG/1
TABLET, ORALLY DISINTEGRATING ORAL
Status: DISCONTINUED
Start: 2020-08-05 | End: 2020-08-05 | Stop reason: HOSPADM

## 2020-08-05 RX ORDER — LIDOCAINE HYDROCHLORIDE 10 MG/ML
INJECTION, SOLUTION EPIDURAL; INFILTRATION; INTRACAUDAL; PERINEURAL
Status: DISCONTINUED
Start: 2020-08-05 | End: 2020-08-05 | Stop reason: HOSPADM

## 2020-08-05 RX ADMIN — ALPRAZOLAM 1 MG: 0.5 TABLET, ORALLY DISINTEGRATING ORAL at 01:08

## 2020-08-05 NOTE — INTERVAL H&P NOTE
The patient has been examined and the H&P has been reviewed:    I concur with the findings and no changes have occurred since H&P was written.    Surgery risks, benefits and alternative options discussed and understood by patient/family.    The procedure that I am ordering/performing has been deemed time sensitive given patient's degree of pain and limitations that this pain composed this upon his/her daily life.        Active Hospital Problems    Diagnosis  POA    DDD (degenerative disc disease), lumbar [M51.36]  Yes      Resolved Hospital Problems   No resolved problems to display.

## 2020-08-05 NOTE — OP NOTE
Lumbar transforaminal DEQUAN    Time-out taken to identify patient and procedure side prior to starting the procedure.   I attest that I have reviewed the patient's home medications prior to the procedure and no contraindication have been identified. I  re-evaluated the patient after the patient was positioned for the procedure in the procedure room immediately before the procedural time-out. The vital signs are current and represent the current state of the patient which has not significantly changed since the preprocedure assessment.                              Date of Service: 08/05/2020    PCP: Toshia Cintron MD    Referring Physician:                                     PROCEDURE: Bilateral L5 transforaminal epidural steroid injection under fluoroscopy    REASON FOR PROCEDURE: Bilateral DDD (degenerative disc disease), lumbar [M51.36]  Lumbar herniated disc [M51.26]  Facet arthropathy, lumbar [M47.816]  Radiculopathy of leg [M54.10]    PHYSICIAN: Chaitanya Luna MD  ASSISTANTS:None    MEDICATIONS INJECTED:  Preservative-free dexamethasone 10mg, Xylocaine 1% MPF 3-5ml. 3ml per level. Preservative free, sterile normal saline is used to get larger volume as needed.  LOCAL ANESTHETIC INJECTED:  Xylocaine 2% 3ml per site.    SEDATION MEDICATIONS: None  ESTIMATED BLOOD LOSS:  None.    COMPLICATIONS:  None.    TECHNIQUE:   Laying in a prone position, the patient was prepped and draped in the usual sterile fashion using ChloraPrep and fenestrated drape.  The area to be injected was determined under fluoroscopic guidance.  Local anesthetic was given by raising a wheel and going down to the hub of a 27-gauge 1.25 inch needle.  The 4.75inch 25-gauge spinal needle was introduced towards the transverse process of each above named nerve root level.  The needle was walked medially then hinged into the neural foramen.  Omnipaque was injected to confirm appropriate placement and that there was no vascular runoff.  The  medication was then injected after applying negative pressure. The patient tolerated the procedure well.    PAIN BEFORE THE PROCEDURE: 8/10.    PAIN AFTER THE PROCEDURE: 8/10.    The patient was monitored after the procedure.  Patient was given post procedure and discharge instructions to follow at home.  We will see the patient back in two weeks or the patient may call to inform of status. The patient was discharged in a stable condition.

## 2020-08-05 NOTE — DISCHARGE SUMMARY
Discharge Diagnosis:DDD (degenerative disc disease), lumbar [M51.36]  Lumbar herniated disc [M51.26]  Facet arthropathy, lumbar [M47.816]  Radiculopathy of leg [M54.10]  Condition on Discharge: Stable.  Diet on Discharge: Same as before.  Activity: as per instruction sheet.  Discharge to: Home with a responsible adult.  Follow up: as per Discharge instructions

## 2020-08-06 ENCOUNTER — OFFICE VISIT (OUTPATIENT)
Dept: FAMILY MEDICINE | Facility: CLINIC | Age: 60
End: 2020-08-06
Payer: COMMERCIAL

## 2020-08-06 ENCOUNTER — TELEPHONE (OUTPATIENT)
Dept: FAMILY MEDICINE | Facility: CLINIC | Age: 60
End: 2020-08-06

## 2020-08-06 VITALS
SYSTOLIC BLOOD PRESSURE: 124 MMHG | RESPIRATION RATE: 16 BRPM | OXYGEN SATURATION: 96 % | DIASTOLIC BLOOD PRESSURE: 72 MMHG | TEMPERATURE: 99 F | BODY MASS INDEX: 31.57 KG/M2 | HEART RATE: 73 BPM | WEIGHT: 196.44 LBS | HEIGHT: 66 IN

## 2020-08-06 DIAGNOSIS — M51.26 LUMBAR HERNIATED DISC: Primary | ICD-10-CM

## 2020-08-06 PROCEDURE — 99999 PR PBB SHADOW E&M-EST. PATIENT-LVL IV: ICD-10-PCS | Mod: PBBFAC,,, | Performed by: INTERNAL MEDICINE

## 2020-08-06 PROCEDURE — 3008F BODY MASS INDEX DOCD: CPT | Mod: CPTII,S$GLB,, | Performed by: INTERNAL MEDICINE

## 2020-08-06 PROCEDURE — 99213 OFFICE O/P EST LOW 20 MIN: CPT | Mod: S$GLB,,, | Performed by: INTERNAL MEDICINE

## 2020-08-06 PROCEDURE — 99999 PR PBB SHADOW E&M-EST. PATIENT-LVL IV: CPT | Mod: PBBFAC,,, | Performed by: INTERNAL MEDICINE

## 2020-08-06 PROCEDURE — 3074F SYST BP LT 130 MM HG: CPT | Mod: CPTII,S$GLB,, | Performed by: INTERNAL MEDICINE

## 2020-08-06 PROCEDURE — 3078F PR MOST RECENT DIASTOLIC BLOOD PRESSURE < 80 MM HG: ICD-10-PCS | Mod: CPTII,S$GLB,, | Performed by: INTERNAL MEDICINE

## 2020-08-06 PROCEDURE — 3074F PR MOST RECENT SYSTOLIC BLOOD PRESSURE < 130 MM HG: ICD-10-PCS | Mod: CPTII,S$GLB,, | Performed by: INTERNAL MEDICINE

## 2020-08-06 PROCEDURE — 3078F DIAST BP <80 MM HG: CPT | Mod: CPTII,S$GLB,, | Performed by: INTERNAL MEDICINE

## 2020-08-06 PROCEDURE — 99213 PR OFFICE/OUTPT VISIT, EST, LEVL III, 20-29 MIN: ICD-10-PCS | Mod: S$GLB,,, | Performed by: INTERNAL MEDICINE

## 2020-08-06 PROCEDURE — 3008F PR BODY MASS INDEX (BMI) DOCUMENTED: ICD-10-PCS | Mod: CPTII,S$GLB,, | Performed by: INTERNAL MEDICINE

## 2020-08-06 NOTE — TELEPHONE ENCOUNTER
----- Message from Lata Da Silva sent at 8/5/2020  4:31 PM CDT -----  Type: Patient Call Back    Who called:self    What is the request in detail:Pt need to speak to nurse    Can the clinic reply by [facility]?no    Would the patient rather a call back or a response via [facility]?call    Best call back number

## 2020-08-06 NOTE — PROGRESS NOTES
Subjective:       Patient ID: Alyce Zurita is a pleasant 60 y.o. White female patient    Chief Complaint: Follow-up (paperwork)      Patient is a pt I saw last on 7/27/2020.  See my last notes and list of problems below.    HPI     Doing better after lumbar TESI yesterday.  Will be seen again by pain specialist in 10 days.  She comes mainly to discuss about form for absence of work.    Patient Active Problem List   Diagnosis    Chronic neck pain    Primary insomnia    Obesity (BMI 30.0-34.9)    Carpal tunnel syndrome of left wrist    Wrist pain, chronic, left    Anxiety    Coronary artery disease involving native coronary artery of native heart without angina pectoris    Abnormal nuclear stress test    Chronic sinusitis    Cervical spondylosis    DDD (degenerative disc disease), cervical    Myofascial pain    Chronic pain of left knee    Right hand pain    Radiculopathy of leg    Dorsalgia, unspecified    Lumbar herniated disc    DDD (degenerative disc disease), lumbar    Sacroiliac joint pain    Facet arthropathy, lumbar          ACTIVE MEDICAL ISSUES:  Documented in Problem List     PAST MEDICAL HISTORY  Documented     PAST SURGICAL HISTORY:  Documented     SOCIAL HISTORY:  Documented     FAMILY HISTORY:  Documented     ALLERGIES AND MEDICATIONS: updated and reviewed.  Documented    Review of Systems   Constitutional: Negative.    HENT: Negative.    Respiratory: Negative.    Cardiovascular: Negative.    Gastrointestinal: Negative.    Musculoskeletal: Positive for back pain.       Objective:      Physical Exam  Vitals signs and nursing note reviewed.   Constitutional:       Appearance: She is obese.   HENT:      Head: Normocephalic and atraumatic.   Neck:      Musculoskeletal: Normal range of motion and neck supple.   Cardiovascular:      Rate and Rhythm: Normal rate and regular rhythm.      Pulses: Normal pulses.      Heart sounds: Normal heart sounds.   Pulmonary:      Effort: Pulmonary effort  "is normal.      Breath sounds: Normal breath sounds.   Musculoskeletal:      Comments: Seems to be less in pain and more active than at last visit   Neurological:      Mental Status: She is alert.         Vitals:    08/06/20 1033   BP: 124/72   BP Location: Right arm   Patient Position: Sitting   BP Method: Large (Manual)   Pulse: 73   Resp: 16   Temp: 99.3 °F (37.4 °C)   TempSrc: Oral   SpO2: 96%   Weight: 89.1 kg (196 lb 6.9 oz)   Height: 5' 6" (1.676 m)     Body mass index is 31.7 kg/m².    RESULTS: Reviewed labs from last 12 months    Assessment:       1. Lumbar herniated disc        Plan:   Alyce was seen today for follow-up.    Diagnoses and all orders for this visit:    Lumbar herniated disc    See HPI.  S/p TESI (lumbar) yesterday. Pt pleased. Will have f-up in 10 days by Pain Specialist.  Patient comes mainly to obtain forms for her work.  We did it together for optimal accuracy.  See in media.    No follow-ups on file.    This note was created by combination of typed  and M-Modal dictation.  Transcription errors may be present.  If there are any questions, please contact me.    "

## 2020-08-06 NOTE — LETTER
August 6, 2020      [facility]  [address]  [place] [address]  Phone: [phone]  Fax: [phone]       Patient: Alyce Zurita   YOB: 1960  Date of Visit: 08/06/2020    To Whom It May Concern:    Adwoa Zurita  was at [facility] on 08/06/2020. She was out of work from 08/05/2020 and may return to work on 08/10/2020 with no restrictions. If you have any questions or concerns, or if I can be of further assistance, please do not hesitate to contact me.    Sincerely,    Toshia Cintron MD

## 2020-08-19 ENCOUNTER — OFFICE VISIT (OUTPATIENT)
Dept: PAIN MEDICINE | Facility: CLINIC | Age: 60
End: 2020-08-19
Payer: COMMERCIAL

## 2020-08-19 VITALS
WEIGHT: 196.13 LBS | TEMPERATURE: 97 F | OXYGEN SATURATION: 96 % | DIASTOLIC BLOOD PRESSURE: 66 MMHG | SYSTOLIC BLOOD PRESSURE: 140 MMHG | HEIGHT: 66 IN | BODY MASS INDEX: 31.52 KG/M2 | HEART RATE: 73 BPM

## 2020-08-19 DIAGNOSIS — M51.36 DDD (DEGENERATIVE DISC DISEASE), LUMBAR: ICD-10-CM

## 2020-08-19 DIAGNOSIS — M51.26 LUMBAR HERNIATED DISC: ICD-10-CM

## 2020-08-19 DIAGNOSIS — M54.10 RADICULOPATHY OF LEG: Primary | ICD-10-CM

## 2020-08-19 PROCEDURE — 99999 PR PBB SHADOW E&M-EST. PATIENT-LVL V: CPT | Mod: PBBFAC,,, | Performed by: REGISTERED NURSE

## 2020-08-19 PROCEDURE — 3008F BODY MASS INDEX DOCD: CPT | Mod: CPTII,S$GLB,, | Performed by: REGISTERED NURSE

## 2020-08-19 PROCEDURE — 99214 OFFICE O/P EST MOD 30 MIN: CPT | Mod: S$GLB,,, | Performed by: REGISTERED NURSE

## 2020-08-19 PROCEDURE — 99214 PR OFFICE/OUTPT VISIT, EST, LEVL IV, 30-39 MIN: ICD-10-PCS | Mod: S$GLB,,, | Performed by: REGISTERED NURSE

## 2020-08-19 PROCEDURE — 99999 PR PBB SHADOW E&M-EST. PATIENT-LVL V: ICD-10-PCS | Mod: PBBFAC,,, | Performed by: REGISTERED NURSE

## 2020-08-19 PROCEDURE — 3008F PR BODY MASS INDEX (BMI) DOCUMENTED: ICD-10-PCS | Mod: CPTII,S$GLB,, | Performed by: REGISTERED NURSE

## 2020-08-19 PROCEDURE — 3077F SYST BP >= 140 MM HG: CPT | Mod: CPTII,S$GLB,, | Performed by: REGISTERED NURSE

## 2020-08-19 PROCEDURE — 3077F PR MOST RECENT SYSTOLIC BLOOD PRESSURE >= 140 MM HG: ICD-10-PCS | Mod: CPTII,S$GLB,, | Performed by: REGISTERED NURSE

## 2020-08-19 PROCEDURE — 3078F PR MOST RECENT DIASTOLIC BLOOD PRESSURE < 80 MM HG: ICD-10-PCS | Mod: CPTII,S$GLB,, | Performed by: REGISTERED NURSE

## 2020-08-19 PROCEDURE — 3078F DIAST BP <80 MM HG: CPT | Mod: CPTII,S$GLB,, | Performed by: REGISTERED NURSE
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
## 2020-08-25 ENCOUNTER — TELEPHONE (OUTPATIENT)
Dept: UROLOGY | Facility: CLINIC | Age: 60
End: 2020-08-25

## 2020-08-25 NOTE — TELEPHONE ENCOUNTER
----- Message from Cal Chris sent at 8/25/2020  8:50 AM CDT -----  Regarding: reschedule procedure on 08/27  Type: Patient Call Back    Who called:Alyce     What is the request in detail: The patient is requesting to reschedule her procedure on 08/27 due to the weather. She would like to move it to another Friday if possible.    Can the clinic reply by [facility]?no    Would the patient rather a call back or a response via [facility]? Call back    Best call back number:[phone]

## 2020-08-27 ENCOUNTER — PROCEDURE VISIT (OUTPATIENT)
Dept: UROLOGY | Facility: CLINIC | Age: 60
End: 2020-08-27
Payer: COMMERCIAL

## 2020-08-27 DIAGNOSIS — R10.2 PELVIC PAIN IN FEMALE: ICD-10-CM

## 2020-08-27 PROCEDURE — 52000 CYSTOSCOPY: ICD-10-PCS | Mod: S$GLB,,, | Performed by: UROLOGY

## 2020-08-27 PROCEDURE — 52000 CYSTOURETHROSCOPY: CPT | Mod: S$GLB,,, | Performed by: UROLOGY

## 2020-08-27 NOTE — PROCEDURES
"Cystoscopy    Date/Time: 8/27/2020 9:15 AM  Performed by: ROMI Ribera MD  Authorized by: ROMI Ribera MD     Consent Done?:  Yes (Written)  Time out: Immediately prior to procedure a "time out" was called to verify the correct patient, procedure, equipment, support staff and site/side marked as required.    Indications comment:  Bladder pain  Position:  Dorsal lithotomy  Anesthesia:  Lidocaine jelly  Patient sedated?: No    Preparation: Patient was prepped and draped in usual sterile fashion      Scope type:  Flexible cystoscope  Stent inserted: No    Stent removed: No    External exam normal: Yes (Cystocele ntoed)    Digital exam performed: No    Urethra normal: Yes  Bladder neck normal: Bladder neck normal   Bladder normal: Yes      Patient tolerance:  Patient tolerated the procedure well with no immediate complications      "

## 2020-08-30 DIAGNOSIS — R10.9 FUNCTIONAL ABDOMINAL PAIN SYNDROME: ICD-10-CM

## 2020-08-31 RX ORDER — DICYCLOMINE HYDROCHLORIDE 20 MG/1
20 TABLET ORAL 2 TIMES DAILY
Qty: 60 TABLET | Refills: 0 | OUTPATIENT
Start: 2020-08-31

## 2020-09-25 ENCOUNTER — LAB VISIT (OUTPATIENT)
Dept: LAB | Facility: HOSPITAL | Age: 60
End: 2020-09-25
Attending: PHYSICIAN ASSISTANT
Payer: COMMERCIAL

## 2020-09-25 ENCOUNTER — PATIENT OUTREACH (OUTPATIENT)
Dept: ADMINISTRATIVE | Facility: HOSPITAL | Age: 60
End: 2020-09-25

## 2020-09-25 ENCOUNTER — PATIENT OUTREACH (OUTPATIENT)
Dept: ADMINISTRATIVE | Facility: OTHER | Age: 60
End: 2020-09-25

## 2020-09-25 ENCOUNTER — OFFICE VISIT (OUTPATIENT)
Dept: UROLOGY | Facility: CLINIC | Age: 60
End: 2020-09-25
Payer: COMMERCIAL

## 2020-09-25 ENCOUNTER — OFFICE VISIT (OUTPATIENT)
Dept: FAMILY MEDICINE | Facility: CLINIC | Age: 60
End: 2020-09-25
Payer: COMMERCIAL

## 2020-09-25 VITALS
SYSTOLIC BLOOD PRESSURE: 112 MMHG | HEART RATE: 69 BPM | TEMPERATURE: 99 F | BODY MASS INDEX: 31.78 KG/M2 | HEIGHT: 66 IN | RESPIRATION RATE: 16 BRPM | OXYGEN SATURATION: 95 % | WEIGHT: 197.75 LBS | DIASTOLIC BLOOD PRESSURE: 86 MMHG

## 2020-09-25 VITALS — WEIGHT: 197.06 LBS | TEMPERATURE: 98 F | HEIGHT: 66 IN | BODY MASS INDEX: 31.67 KG/M2

## 2020-09-25 DIAGNOSIS — J01.90 ACUTE BACTERIAL SINUSITIS: Primary | ICD-10-CM

## 2020-09-25 DIAGNOSIS — K21.9 GASTROESOPHAGEAL REFLUX DISEASE, ESOPHAGITIS PRESENCE NOT SPECIFIED: ICD-10-CM

## 2020-09-25 DIAGNOSIS — M54.2 NECK PAIN: ICD-10-CM

## 2020-09-25 DIAGNOSIS — B96.89 ACUTE BACTERIAL SINUSITIS: Primary | ICD-10-CM

## 2020-09-25 DIAGNOSIS — R30.0 DYSURIA: ICD-10-CM

## 2020-09-25 DIAGNOSIS — B37.9 ANTIBIOTIC-INDUCED YEAST INFECTION: ICD-10-CM

## 2020-09-25 DIAGNOSIS — R10.9 FUNCTIONAL ABDOMINAL PAIN SYNDROME: ICD-10-CM

## 2020-09-25 DIAGNOSIS — T36.95XA ANTIBIOTIC-INDUCED YEAST INFECTION: ICD-10-CM

## 2020-09-25 DIAGNOSIS — R35.0 URINARY FREQUENCY: ICD-10-CM

## 2020-09-25 DIAGNOSIS — R10.2 PELVIC PAIN IN FEMALE: Primary | ICD-10-CM

## 2020-09-25 LAB
BILIRUB SERPL-MCNC: NORMAL MG/DL
BLOOD URINE, POC: NORMAL
COLOR, POC UA: YELLOW
GLUCOSE UR QL STRIP: NORMAL
KETONES UR QL STRIP: NORMAL
LEUKOCYTE ESTERASE URINE, POC: NORMAL
NITRITE, POC UA: NORMAL
PH, POC UA: 6
PROTEIN, POC: NORMAL
SPECIFIC GRAVITY, POC UA: 1000
T4 FREE SERPL-MCNC: 1.01 NG/DL (ref 0.71–1.51)
TSH SERPL DL<=0.005 MIU/L-ACNC: 0.96 UIU/ML (ref 0.4–4)
UROBILINOGEN, POC UA: NORMAL

## 2020-09-25 PROCEDURE — 3078F DIAST BP <80 MM HG: CPT | Mod: CPTII,S$GLB,, | Performed by: UROLOGY

## 2020-09-25 PROCEDURE — 99214 OFFICE O/P EST MOD 30 MIN: CPT | Mod: S$GLB,,, | Performed by: PHYSICIAN ASSISTANT

## 2020-09-25 PROCEDURE — 99999 PR PBB SHADOW E&M-EST. PATIENT-LVL IV: CPT | Mod: PBBFAC,,, | Performed by: UROLOGY

## 2020-09-25 PROCEDURE — 3008F PR BODY MASS INDEX (BMI) DOCUMENTED: ICD-10-PCS | Mod: CPTII,S$GLB,, | Performed by: PHYSICIAN ASSISTANT

## 2020-09-25 PROCEDURE — 3008F PR BODY MASS INDEX (BMI) DOCUMENTED: ICD-10-PCS | Mod: CPTII,S$GLB,, | Performed by: UROLOGY

## 2020-09-25 PROCEDURE — 3079F DIAST BP 80-89 MM HG: CPT | Mod: CPTII,S$GLB,, | Performed by: PHYSICIAN ASSISTANT

## 2020-09-25 PROCEDURE — 99999 PR PBB SHADOW E&M-EST. PATIENT-LVL IV: ICD-10-PCS | Mod: PBBFAC,,, | Performed by: UROLOGY

## 2020-09-25 PROCEDURE — 3074F SYST BP LT 130 MM HG: CPT | Mod: CPTII,S$GLB,, | Performed by: UROLOGY

## 2020-09-25 PROCEDURE — 3074F PR MOST RECENT SYSTOLIC BLOOD PRESSURE < 130 MM HG: ICD-10-PCS | Mod: CPTII,S$GLB,, | Performed by: PHYSICIAN ASSISTANT

## 2020-09-25 PROCEDURE — 3078F PR MOST RECENT DIASTOLIC BLOOD PRESSURE < 80 MM HG: ICD-10-PCS | Mod: CPTII,S$GLB,, | Performed by: UROLOGY

## 2020-09-25 PROCEDURE — 81001 POCT URINALYSIS, DIPSTICK OR TABLET REAGENT, AUTOMATED, WITH MICROSCOP: ICD-10-PCS | Mod: S$GLB,,, | Performed by: UROLOGY

## 2020-09-25 PROCEDURE — 3074F PR MOST RECENT SYSTOLIC BLOOD PRESSURE < 130 MM HG: ICD-10-PCS | Mod: CPTII,S$GLB,, | Performed by: UROLOGY

## 2020-09-25 PROCEDURE — 3079F PR MOST RECENT DIASTOLIC BLOOD PRESSURE 80-89 MM HG: ICD-10-PCS | Mod: CPTII,S$GLB,, | Performed by: PHYSICIAN ASSISTANT

## 2020-09-25 PROCEDURE — 99214 PR OFFICE/OUTPT VISIT, EST, LEVL IV, 30-39 MIN: ICD-10-PCS | Mod: S$GLB,,, | Performed by: PHYSICIAN ASSISTANT

## 2020-09-25 PROCEDURE — 36415 COLL VENOUS BLD VENIPUNCTURE: CPT | Mod: PO

## 2020-09-25 PROCEDURE — 81001 URINALYSIS AUTO W/SCOPE: CPT | Mod: S$GLB,,, | Performed by: UROLOGY

## 2020-09-25 PROCEDURE — 84443 ASSAY THYROID STIM HORMONE: CPT

## 2020-09-25 PROCEDURE — 99213 PR OFFICE/OUTPT VISIT, EST, LEVL III, 20-29 MIN: ICD-10-PCS | Mod: 25,S$GLB,, | Performed by: UROLOGY

## 2020-09-25 PROCEDURE — 3074F SYST BP LT 130 MM HG: CPT | Mod: CPTII,S$GLB,, | Performed by: PHYSICIAN ASSISTANT

## 2020-09-25 PROCEDURE — 99999 PR PBB SHADOW E&M-EST. PATIENT-LVL IV: ICD-10-PCS | Mod: PBBFAC,,, | Performed by: PHYSICIAN ASSISTANT

## 2020-09-25 PROCEDURE — 3008F BODY MASS INDEX DOCD: CPT | Mod: CPTII,S$GLB,, | Performed by: UROLOGY

## 2020-09-25 PROCEDURE — 3008F BODY MASS INDEX DOCD: CPT | Mod: CPTII,S$GLB,, | Performed by: PHYSICIAN ASSISTANT

## 2020-09-25 PROCEDURE — 99213 OFFICE O/P EST LOW 20 MIN: CPT | Mod: 25,S$GLB,, | Performed by: UROLOGY

## 2020-09-25 PROCEDURE — 84439 ASSAY OF FREE THYROXINE: CPT

## 2020-09-25 PROCEDURE — 99999 PR PBB SHADOW E&M-EST. PATIENT-LVL IV: CPT | Mod: PBBFAC,,, | Performed by: PHYSICIAN ASSISTANT

## 2020-09-25 RX ORDER — OMEPRAZOLE 40 MG/1
40 CAPSULE, DELAYED RELEASE ORAL
Qty: 180 CAPSULE | Refills: 3 | Status: SHIPPED | OUTPATIENT
Start: 2020-09-25 | End: 2021-03-16 | Stop reason: SDUPTHER

## 2020-09-25 RX ORDER — AMOXICILLIN AND CLAVULANATE POTASSIUM 875; 125 MG/1; MG/1
1 TABLET, FILM COATED ORAL 2 TIMES DAILY
Qty: 14 TABLET | Refills: 0 | Status: SHIPPED | OUTPATIENT
Start: 2020-09-25 | End: 2020-10-02

## 2020-09-25 RX ORDER — DICYCLOMINE HYDROCHLORIDE 20 MG/1
20 TABLET ORAL 2 TIMES DAILY
Qty: 60 TABLET | Refills: 0 | Status: SHIPPED | OUTPATIENT
Start: 2020-09-25 | End: 2020-11-01 | Stop reason: SDUPTHER

## 2020-09-25 RX ORDER — FLUCONAZOLE 150 MG/1
150 TABLET ORAL DAILY
Qty: 1 TABLET | Refills: 0 | Status: SHIPPED | OUTPATIENT
Start: 2020-09-25 | End: 2020-12-02 | Stop reason: SDUPTHER

## 2020-09-25 NOTE — PROGRESS NOTES
Subjective:       Patient ID: Alyce Zurita is a 60 y.o. female who was last seen in this office 2020    Chief Complaint:   Chief Complaint   Patient presents with    Pelvic Pain     4 week f/u pt states she is having a little lower abd.pain        Pelvic Pain  She describes left lower quadrant pain for the past year.  It occurs almost every day.  It is sharp and intense.  She does a lot of physical activity with lifting and bending at work.  She denies hematuria.  She feels that she has had a constant infection for the past year.  She may have pneumaturia.  She describes issues with a complicated total hysterectomy about 30 years ago for endometriosis.  She had issues with her bladder that was opened and required repaired.  She required many checks and antibiotics at the time as well as weekly catheterizations.  , vaginal.     She has been seeing gastroenterology for the LLQ pain but she did not follow up after imaging.  She had a colonoscopy several months ago, she had diverticulosis and polyps.     She is back with a CT scan and Cystoscopy.    She continues to have pain, mostly on the left behind her hysterectomy scar.    ACTIVE MEDICAL ISSUES:  Patient Active Problem List   Diagnosis    Chronic neck pain    Primary insomnia    Obesity (BMI 30.0-34.9)    Carpal tunnel syndrome of left wrist    Wrist pain, chronic, left    Anxiety    Coronary artery disease involving native coronary artery of native heart without angina pectoris    Abnormal nuclear stress test    Chronic sinusitis    Cervical spondylosis    DDD (degenerative disc disease), cervical    Myofascial pain    Chronic pain of left knee    Right hand pain    Radiculopathy of leg    Dorsalgia, unspecified    Lumbar herniated disc    DDD (degenerative disc disease), lumbar    Sacroiliac joint pain    Facet arthropathy, lumbar       ALLERGIES AND MEDICATIONS: updated and reviewed.  Review of patient's allergies indicates:    Allergen Reactions    Other omega-3s Nausea And Vomiting     chlorine    Rofecoxib Nausea And Vomiting     Current Outpatient Medications   Medication Sig    amoxicillin-clavulanate 875-125mg (AUGMENTIN) 875-125 mg per tablet Take 1 tablet by mouth 2 (two) times daily. for 7 days    aspirin (ECOTRIN) 81 MG EC tablet Take 1 tablet (81 mg total) by mouth once daily.    cholecalciferol, vitamin D3, (VITAMIN D3 ORAL) Take by mouth 2 (two) times daily.    dextromethorphan-guaifenesin  mg (MUCINEX DM)  mg per 12 hr tablet Take 1 tablet by mouth every 12 (twelve) hours.    dicyclomine (BENTYL) 20 mg tablet Take 1 tablet (20 mg total) by mouth 2 (two) times daily.    fluconazole (DIFLUCAN) 150 MG Tab Take 1 tablet (150 mg total) by mouth once daily.    FLUoxetine 20 MG capsule Take 1 capsule (20 mg total) by mouth every morning.    gabapentin (NEURONTIN) 300 MG capsule Take 4 tablet in the morning and 4 tablet nightly-total of 1200 mg daily as tolerated for nerve pain.    glucosamine/chondr gray A sod (OSTEO BI-FLEX ORAL) Take by mouth 2 (two) times daily.    hydroCHLOROthiazide (MICROZIDE) 12.5 mg capsule Take 1 capsule (12.5 mg total) by mouth once daily.    meclizine (ANTIVERT) 25 mg tablet Take 1 tablet (25 mg total) by mouth 3 (three) times daily as needed.    metoprolol tartrate (LOPRESSOR) 25 MG tablet Take 1 tablet (25 mg total) by mouth once daily.    montelukast (SINGULAIR) 10 mg tablet Take 1 tablet (10 mg total) by mouth every evening.    omeprazole (PRILOSEC) 40 MG capsule Take 1 capsule (40 mg total) by mouth 2 (two) times daily before meals.    tiZANidine (ZANAFLEX) 4 MG tablet Take 1 tablet (4 mg total) by mouth every 8 (eight) hours.    traZODone (DESYREL) 150 MG tablet Take 0.5 tablets (75 mg total) by mouth every evening.    fluticasone (FLONASE) 50 mcg/actuation nasal spray 2 sprays by Each Nare route once daily.    nitroGLYCERIN (NITROSTAT) 0.4 MG SL tablet Place 0.4 mg  "under the tongue.    pentosan polysulfate (ELMIRON) 100 mg Cap Take 1 capsule (100 mg total) by mouth 3 (three) times daily.     No current facility-administered medications for this visit.        Review of Systems   Constitutional: Negative for activity change, fatigue, fever and unexpected weight change.   Eyes: Negative for redness and visual disturbance.   Respiratory: Negative for chest tightness and shortness of breath.    Cardiovascular: Negative for chest pain and leg swelling.   Gastrointestinal: Negative for abdominal distention, abdominal pain, constipation, diarrhea, nausea and vomiting.   Genitourinary: Negative for difficulty urinating, dysuria, flank pain, frequency, hematuria, pelvic pain, urgency and vaginal bleeding.   Musculoskeletal: Negative for arthralgias and joint swelling.   Neurological: Negative for dizziness, weakness and headaches.   Psychiatric/Behavioral: Negative for confusion. The patient is not nervous/anxious.    All other systems reviewed and are negative.      Objective:      Vitals:    09/25/20 1455   Temp: 98.3 °F (36.8 °C)   Weight: 89.4 kg (197 lb 1.5 oz)   Height: 5' 6" (1.676 m)     Physical Exam   Nursing note and vitals reviewed.  Constitutional: She is oriented to person, place, and time. She appears well-developed.   HENT:   Head: Normocephalic.   Eyes: Conjunctivae are normal.   Neck: Normal range of motion. No tracheal deviation present. No thyromegaly present.   Cardiovascular: Normal rate, normal heart sounds and normal pulses.    Pulmonary/Chest: Effort normal and breath sounds normal. No respiratory distress. She has no wheezes.   Abdominal: Soft. She exhibits no distension and no mass. There is no abdominal tenderness. There is no rebound and no guarding. No hernia.   Musculoskeletal: Normal range of motion. No tenderness.   Lymphadenopathy:     She has no cervical adenopathy.   Neurological: She is alert and oriented to person, place, and time.   Skin: Skin is " warm and dry. No rash noted. No erythema.     Psychiatric: Her behavior is normal. Judgment and thought content normal.       Urine dipstick shows negative for all components.  Micro exam: negative for WBC's or RBC's.    CT Urogram Abd Pelvis W WO  Order: [id number]  Status:  Final result   Visible to patient:  Yes (Patient Portal)   Next appt:  10/09/2020 at 11:20 AM in Family Medicine (Toshia Cintron MD)   Dx:  Dysuria  Details    Reading Physician Reading Date Result Priority   Eldon Young Jr., MD  [phone]  [phone] 7/27/2020 Routine      Narrative & Impression     EXAMINATION:  CT UROGRAM ABD PELVIS W WO     CLINICAL HISTORY:  Dysuria;Dysuria     TECHNIQUE:  Low dose axial, sagittal and coronal reformations were obtained from the lung bases to the pubic symphysis before and following the IV administration of 125 mL of Omnipaque 350.  Timing was optimized for nephrogram and excretory renal phases.     COMPARISON:  CT renal stone study December 2014.     FINDINGS:  In the chest there may be trace left pleural fluid.  Some subpleural fat noted.  6 mm right lung nodule abutting the diaphragm similar to 2014.  No new lung nodules.     In the abdomen liver is normal in overall size.  No focal hepatic masses.  Gallbladder, pancreas, and spleen are normal.  No adrenal or renal masses.  No hydronephrosis.  No nephrolithiasis.  Ureters are incompletely opacified to the bladder.  No convincing obstructing lesions.  Bladder is visualized is unremarkable.     Calcified plaque in the aorta.  Nonenlarged para-aortic nodes.  No adenopathy or aneurysm.     In the pelvis no pelvic adenopathy.  Uterus not visualized likely removed.  Evaluation of the bowel demonstrates scattered diverticula.  No focal bowel dilatation.  Nonenlarged mesenteric nodes.  There is some minimal soft tissue stranding at the junction of the descending and sigmoid colon.  This is new compared to 2014.     Evaluation of the bones demonstrate  no lytic or blastic lesion.  Degenerative changes are noted.     Impression:     No convincing abnormality identified.  Visualized kidneys and collecting systems are unremarkable noting the entirety of the ureters are not opacified.     Postop hysterectomy.     Minimal soft tissue stranding junction of the descending and sigmoid colon.  Correlation with clinical findings.     Stable 6 mm right lung nodule no further evaluation indicated.        Electronically signed by: Eldon Young MD  Date:                                            07/27/2020  Time:                                           10:59            Last Resulted: 07/27/20 10:59               Assessment:       1. Pelvic pain in female    2. Dysuria    3. Urinary frequency          Plan:       1. Pelvic pain in female  She was cautioned to check with her eye doctor on a regular basis  Avoid bladder irritants  - pentosan polysulfate (ELMIRON) 100 mg Cap; Take 1 capsule (100 mg total) by mouth 3 (three) times daily.  Dispense: 90 capsule; Refill: 2    2. Dysuria    - POCT urinalysis, dipstick or tablet reag    3. Urinary frequency  stable            Follow up in about 3 months (around 12/25/2020) for Follow up.

## 2020-09-25 NOTE — PROGRESS NOTES
Health Maintenance Due   Topic     Shingles Vaccine (1 of 2) No hx chickenpox ; inform pt can get vaccine at pharmacy.      Influenza Vaccine (1) Consult if patient ok to get today due to sinus problem

## 2020-09-25 NOTE — PROGRESS NOTES
"Subjective:       Patient ID: Alyce Zurita is a 60 y.o. female.    Chief Complaint: Sinus Problem (since last weekened worsen congestion, "gland swelling", dizziness, and ear fullness ) and Fall (2 weeks ago )    Sinus Problem  This is a new problem. The current episode started 1 to 4 weeks ago (2 weeks). There has been no fever. Associated symptoms include congestion, ear pain, headaches, sinus pressure, sneezing and a sore throat. Pertinent negatives include no coughing or shortness of breath. Treatments tried: mucinex.    Patient also complains of tenderness in the middle of her neck/throat.  She states this has been occurring for a while . she said she also sometimes feels she has difficulty swallowing.  That food gets stuck in that area.  She denies a history of thyroid problems.  Last TSH in 2018 was normal.    Social History     Socioeconomic History    Marital status:      Spouse name: Not on file    Number of children: Not on file    Years of education: Not on file    Highest education level: Not on file   Occupational History    Not on file   Social Needs    Financial resource strain: Not very hard    Food insecurity     Worry: Never true     Inability: Never true    Transportation needs     Medical: No     Non-medical: No   Tobacco Use    Smoking status: Never Smoker    Smokeless tobacco: Never Used   Substance and Sexual Activity    Alcohol use: No     Frequency: Monthly or less     Drinks per session: 1 or 2     Binge frequency: Never    Drug use: No    Sexual activity: Yes     Partners: Male     Birth control/protection: None     Comment: 7/20/18  with same partner for 40 years    Lifestyle    Physical activity     Days per week: 5 days     Minutes per session: 80 min    Stress: Rather much   Relationships    Social connections     Talks on phone: More than three times a week     Gets together: Once a week     Attends Jainism service: Not on file     Active member of " club or organization: Yes     Attends meetings of clubs or organizations: More than 4 times per year     Relationship status:    Other Topics Concern    Not on file   Social History Narrative    Not on file       Review of Systems   HENT: Positive for nasal congestion, ear pain, sinus pressure/congestion, sneezing and sore throat.    Respiratory: Negative for cough and shortness of breath.    Neurological: Positive for dizziness ( h/o vertigo) and headaches.         Objective:      Physical Exam  Constitutional:       Appearance: Normal appearance.   HENT:      Head: Normocephalic and atraumatic.      Ears:      Comments: Fluid behind tms     Nose:      Right Sinus: Maxillary sinus tenderness and frontal sinus tenderness present.      Left Sinus: Maxillary sinus tenderness and frontal sinus tenderness present.      Mouth/Throat:      Pharynx: Posterior oropharyngeal erythema present.      Tonsils: No tonsillar exudate or tonsillar abscesses.   Neck:      Thyroid: Thyroid tenderness present. No thyroid mass or thyromegaly.   Cardiovascular:      Rate and Rhythm: Normal rate and regular rhythm.   Pulmonary:      Effort: Pulmonary effort is normal.      Breath sounds: Normal breath sounds.   Neurological:      General: No focal deficit present.      Mental Status: She is alert and oriented to person, place, and time.   Psychiatric:         Mood and Affect: Mood normal.         Assessment:       1. Acute bacterial sinusitis    2. Functional abdominal pain syndrome    3. Gastroesophageal reflux disease, esophagitis presence not specified    4. Antibiotic-induced yeast infection    5. Neck pain        Plan:         Alyce was seen today for sinus problem and fall.    Diagnoses and all orders for this visit:    Acute bacterial sinusitis  -     amoxicillin-clavulanate 875-125mg (AUGMENTIN) 875-125 mg per tablet; Take 1 tablet by mouth 2 (two) times daily. for 7 days  -     Saline nasal spray, rest, fluids, call if  no relief    Functional abdominal pain syndrome  -     dicyclomine (BENTYL) 20 mg tablet; Take 1 tablet (20 mg total) by mouth 2 (two) times daily.    Gastroesophageal reflux disease, esophagitis presence not specified  -     omeprazole (PRILOSEC) 40 MG capsule; Take 1 capsule (40 mg total) by mouth 2 (two) times daily before meals.    Antibiotic-induced yeast infection  -     fluconazole (DIFLUCAN) 150 MG Tab; Take 1 tablet (150 mg total) by mouth once daily.    Neck pain  -     TSH; Future  -     T4, free; Future  -     will assess thyroid levels, likely get ultrasound to assess thyroid

## 2020-09-25 NOTE — PROGRESS NOTES
Health Maintenance Due   Topic Date Due    Shingles Vaccine (1 of 2) 07/18/2010    Influenza Vaccine (1) 08/01/2020     Updates were requested from care everywhere.  Chart was reviewed for overdue Proactive [facility] Encounters ([facility]) topics (CRS, Breast Cancer Screening, Eye exam)  Health Maintenance has been updated.  LINKS immunization registry triggered.  Immunizations were reconciled.    HIV updated.

## 2020-10-05 DIAGNOSIS — K21.9 GASTROESOPHAGEAL REFLUX DISEASE, ESOPHAGITIS PRESENCE NOT SPECIFIED: ICD-10-CM

## 2020-10-05 RX ORDER — OMEPRAZOLE 40 MG/1
40 CAPSULE, DELAYED RELEASE ORAL
Qty: 180 CAPSULE | Refills: 3 | Status: CANCELLED | OUTPATIENT
Start: 2020-10-05

## 2020-10-09 ENCOUNTER — CLINICAL SUPPORT (OUTPATIENT)
Dept: FAMILY MEDICINE | Facility: CLINIC | Age: 60
End: 2020-10-09
Payer: COMMERCIAL

## 2020-10-09 DIAGNOSIS — Z23 NEED FOR INFLUENZA VACCINATION: Primary | ICD-10-CM

## 2020-10-09 PROCEDURE — 90471 FLU VACCINE (QUAD) GREATER THAN OR EQUAL TO 3YO PRESERVATIVE FREE IM: ICD-10-PCS | Mod: S$GLB,,, | Performed by: INTERNAL MEDICINE

## 2020-10-09 PROCEDURE — 99999 PR PBB SHADOW E&M-EST. PATIENT-LVL I: CPT | Mod: PBBFAC,,,

## 2020-10-09 PROCEDURE — 99999 PR PBB SHADOW E&M-EST. PATIENT-LVL I: ICD-10-PCS | Mod: PBBFAC,,,

## 2020-10-09 PROCEDURE — 90686 FLU VACCINE (QUAD) GREATER THAN OR EQUAL TO 3YO PRESERVATIVE FREE IM: ICD-10-PCS | Mod: S$GLB,,, | Performed by: INTERNAL MEDICINE

## 2020-10-09 PROCEDURE — 90471 IMMUNIZATION ADMIN: CPT | Mod: S$GLB,,, | Performed by: INTERNAL MEDICINE

## 2020-10-09 PROCEDURE — 90686 IIV4 VACC NO PRSV 0.5 ML IM: CPT | Mod: S$GLB,,, | Performed by: INTERNAL MEDICINE

## 2020-11-01 DIAGNOSIS — R10.9 FUNCTIONAL ABDOMINAL PAIN SYNDROME: ICD-10-CM

## 2020-11-02 RX ORDER — HYDROCHLOROTHIAZIDE 12.5 MG/1
12.5 CAPSULE ORAL DAILY
Qty: 90 CAPSULE | Refills: 1 | Status: SHIPPED | OUTPATIENT
Start: 2020-11-02 | End: 2021-09-11 | Stop reason: SDUPTHER

## 2020-11-02 RX ORDER — DICYCLOMINE HYDROCHLORIDE 20 MG/1
20 TABLET ORAL 2 TIMES DAILY
Qty: 60 TABLET | Refills: 3 | Status: SHIPPED | OUTPATIENT
Start: 2020-11-02 | End: 2021-02-15

## 2020-11-29 DIAGNOSIS — I95.9 HYPOTENSION, UNSPECIFIED HYPOTENSION TYPE: ICD-10-CM

## 2020-11-30 RX ORDER — METOPROLOL TARTRATE 25 MG/1
25 TABLET, FILM COATED ORAL DAILY
Qty: 90 TABLET | Refills: 2 | Status: SHIPPED | OUTPATIENT
Start: 2020-11-30 | End: 2021-11-17

## 2020-12-01 NOTE — PROGRESS NOTES
Subjective:       Patient ID: Alyce Zurita is a pleasant 60 y.o. White female patient    Chief Complaint: Sinus Problem and Otalgia      Patient is a pt I saw last on 10/09/2020, see my last notes and the list of problems below.    HPI     She comes today with a 3 weeks complaint of sinus congestion and ear fullness sensation.  She also feels that her ears are ringing.  Sinus pain is frontal and maxillary.  It is not the 1st episode.  She denies any cough or shortness of breath.  She does not have a sore throat but reports that for years she has been presenting with a knot on the lower part of the anterior cervical area that the interfering with her ability to eat.  It has been worse for 1 month.  She states it is painful to palpation. No WL.  She  lost her sister who passed away 1 week ago from metastatic cancer.  She was 67-year-old.  She is coping correctly but wonders if she can obtain a few pills of benzos to help in this difficult period.    Patient Active Problem List   Diagnosis    Chronic neck pain    Primary insomnia    Obesity (BMI 30.0-34.9)    Carpal tunnel syndrome of left wrist    Wrist pain, chronic, left    Anxiety    Coronary artery disease involving native coronary artery of native heart without angina pectoris    Abnormal nuclear stress test    Chronic sinusitis    Cervical spondylosis    DDD (degenerative disc disease), cervical    Myofascial pain    Chronic pain of left knee    Right hand pain    Radiculopathy of leg    Dorsalgia, unspecified    Lumbar herniated disc    DDD (degenerative disc disease), lumbar    Sacroiliac joint pain    Facet arthropathy, lumbar          ACTIVE MEDICAL ISSUES:  Documented in Problem List     PAST MEDICAL HISTORY  Documented     PAST SURGICAL HISTORY:  Documented     SOCIAL HISTORY:  Documented     FAMILY HISTORY:  Documented     ALLERGIES AND MEDICATIONS: updated and reviewed.  Documented    Review of Systems   Constitutional: Negative.   "  HENT: Positive for ear pain, sinus pressure, sinus pain and trouble swallowing.    Eyes: Negative.    Respiratory: Negative.    Cardiovascular: Negative.    All other systems reviewed and are negative.      Objective:      Physical Exam  Vitals signs and nursing note reviewed.   Constitutional:       Appearance: Normal appearance.   HENT:      Head:      Comments: Pain to percussion of bilateral maxillary and frontal areas     Right Ear: There is no impacted cerumen.      Left Ear: There is no impacted cerumen.      Ears:      Comments: Fluid behind bilateral TMs.  Neck:      Comments: difficult to palpate the thyroid as pt reports pain.   Cardiovascular:      Rate and Rhythm: Normal rate and regular rhythm.      Pulses: Normal pulses.      Heart sounds: Normal heart sounds.   Pulmonary:      Effort: Pulmonary effort is normal.      Breath sounds: Normal breath sounds.   Abdominal:      General: Abdomen is flat. Bowel sounds are normal.      Palpations: Abdomen is soft.   Lymphadenopathy:      Cervical: No cervical adenopathy.   Neurological:      Mental Status: She is alert.         Vitals:    12/02/20 1630   BP: (!) 140/72   BP Location: Right arm   Patient Position: Sitting   BP Method: Large (Manual)   Pulse: 69   Resp: 16   Temp: 98.2 °F (36.8 °C)   TempSrc: Oral   SpO2: 96%   Weight: 88.2 kg (194 lb 7.1 oz)   Height: 5' 6" (1.676 m)     Body mass index is 31.38 kg/m².    RESULTS: Reviewed labs from last 12 months    Assessment:       1. Acute bacterial sinusitis    2. Antibiotic-induced yeast infection    3. Chronic neck pain    4. Thyroid pain    5. Grief reaction        Plan:   Alyce was seen today for sinus problem and otalgia.    Diagnoses and all orders for this visit:    Acute bacterial sinusitis  -     amoxicillin-clavulanate 875-125mg (AUGMENTIN) 875-125 mg per tablet; Take 1 tablet by mouth 2 (two) times daily. for 7 days  -     predniSONE (DELTASONE) 20 MG tablet; Take 1 tablet (20 mg total) by " mouth once daily. for 5 days    Will treat with Augmentin for 7 days and also give her prednisone for 5 days, 40 mg a day.  Advised patient regarding symptoms and signs of concern that may prompt her to seek for medical attention.    Antibiotic-induced yeast infection  -     fluconazole (DIFLUCAN) 150 MG Tab; Take 1 tablet (150 mg total) by mouth once daily.    Will give prophylactic Diflucan.    Chronic neck pain    See below.  See also HPI.  May need barium swallow study or possible referral to ENT.    Thyroid pain  -     US Soft Tissue Head Neck Thyroid; Future    See HPI.  Difficult to palpate the area.  Will start with an ultrasound of the thyroid.    Grief reaction  -     ALPRAZolam (XANAX) 0.5 MG tablet; Take 1 tablet (0.5 mg total) by mouth nightly as needed for Insomnia or Anxiety.    Will order alprazolam for 7 days only.  Patient will call me if not feeling better.  No follow-ups on file.    This note was created by combination of typed  and M-Modal dictation.  Transcription errors may be present.  If there are any questions, please contact me.

## 2020-12-02 ENCOUNTER — OFFICE VISIT (OUTPATIENT)
Dept: FAMILY MEDICINE | Facility: CLINIC | Age: 60
End: 2020-12-02
Payer: COMMERCIAL

## 2020-12-02 VITALS
HEART RATE: 69 BPM | BODY MASS INDEX: 31.25 KG/M2 | OXYGEN SATURATION: 96 % | WEIGHT: 194.44 LBS | DIASTOLIC BLOOD PRESSURE: 72 MMHG | SYSTOLIC BLOOD PRESSURE: 140 MMHG | HEIGHT: 66 IN | RESPIRATION RATE: 16 BRPM | TEMPERATURE: 98 F

## 2020-12-02 DIAGNOSIS — G89.29 CHRONIC NECK PAIN: ICD-10-CM

## 2020-12-02 DIAGNOSIS — M54.2 CHRONIC NECK PAIN: ICD-10-CM

## 2020-12-02 DIAGNOSIS — B96.89 ACUTE BACTERIAL SINUSITIS: ICD-10-CM

## 2020-12-02 DIAGNOSIS — B37.9 ANTIBIOTIC-INDUCED YEAST INFECTION: ICD-10-CM

## 2020-12-02 DIAGNOSIS — T36.95XA ANTIBIOTIC-INDUCED YEAST INFECTION: ICD-10-CM

## 2020-12-02 DIAGNOSIS — J01.90 ACUTE BACTERIAL SINUSITIS: ICD-10-CM

## 2020-12-02 DIAGNOSIS — F43.21 GRIEF REACTION: ICD-10-CM

## 2020-12-02 DIAGNOSIS — E07.89 THYROID PAIN: ICD-10-CM

## 2020-12-02 PROCEDURE — 3008F PR BODY MASS INDEX (BMI) DOCUMENTED: ICD-10-PCS | Mod: CPTII,S$GLB,, | Performed by: INTERNAL MEDICINE

## 2020-12-02 PROCEDURE — 99999 PR PBB SHADOW E&M-EST. PATIENT-LVL V: ICD-10-PCS | Mod: PBBFAC,,, | Performed by: INTERNAL MEDICINE

## 2020-12-02 PROCEDURE — 1125F AMNT PAIN NOTED PAIN PRSNT: CPT | Mod: S$GLB,,, | Performed by: INTERNAL MEDICINE

## 2020-12-02 PROCEDURE — 3078F DIAST BP <80 MM HG: CPT | Mod: CPTII,S$GLB,, | Performed by: INTERNAL MEDICINE

## 2020-12-02 PROCEDURE — 3077F SYST BP >= 140 MM HG: CPT | Mod: CPTII,S$GLB,, | Performed by: INTERNAL MEDICINE

## 2020-12-02 PROCEDURE — 99999 PR PBB SHADOW E&M-EST. PATIENT-LVL V: CPT | Mod: PBBFAC,,, | Performed by: INTERNAL MEDICINE

## 2020-12-02 PROCEDURE — 1125F PR PAIN SEVERITY QUANTIFIED, PAIN PRESENT: ICD-10-PCS | Mod: S$GLB,,, | Performed by: INTERNAL MEDICINE

## 2020-12-02 PROCEDURE — 3078F PR MOST RECENT DIASTOLIC BLOOD PRESSURE < 80 MM HG: ICD-10-PCS | Mod: CPTII,S$GLB,, | Performed by: INTERNAL MEDICINE

## 2020-12-02 PROCEDURE — 99214 PR OFFICE/OUTPT VISIT, EST, LEVL IV, 30-39 MIN: ICD-10-PCS | Mod: S$GLB,,, | Performed by: INTERNAL MEDICINE

## 2020-12-02 PROCEDURE — 3008F BODY MASS INDEX DOCD: CPT | Mod: CPTII,S$GLB,, | Performed by: INTERNAL MEDICINE

## 2020-12-02 PROCEDURE — 3077F PR MOST RECENT SYSTOLIC BLOOD PRESSURE >= 140 MM HG: ICD-10-PCS | Mod: CPTII,S$GLB,, | Performed by: INTERNAL MEDICINE

## 2020-12-02 PROCEDURE — 99214 OFFICE O/P EST MOD 30 MIN: CPT | Mod: S$GLB,,, | Performed by: INTERNAL MEDICINE

## 2020-12-02 RX ORDER — AMOXICILLIN AND CLAVULANATE POTASSIUM 875; 125 MG/1; MG/1
1 TABLET, FILM COATED ORAL 2 TIMES DAILY
Qty: 14 TABLET | Refills: 0 | Status: SHIPPED | OUTPATIENT
Start: 2020-12-02 | End: 2020-12-09

## 2020-12-02 RX ORDER — FLUCONAZOLE 150 MG/1
150 TABLET ORAL DAILY
Qty: 1 TABLET | Refills: 0 | Status: SHIPPED | OUTPATIENT
Start: 2020-12-02 | End: 2021-08-03 | Stop reason: SDUPTHER

## 2020-12-02 RX ORDER — ALPRAZOLAM 0.5 MG/1
0.5 TABLET ORAL NIGHTLY PRN
Qty: 7 TABLET | Refills: 0 | Status: SHIPPED | OUTPATIENT
Start: 2020-12-02 | End: 2021-03-20 | Stop reason: SDUPTHER

## 2020-12-02 RX ORDER — PREDNISONE 20 MG/1
20 TABLET ORAL DAILY
Qty: 10 TABLET | Refills: 0 | Status: SHIPPED | OUTPATIENT
Start: 2020-12-02 | End: 2020-12-07

## 2020-12-03 ENCOUNTER — TELEPHONE (OUTPATIENT)
Dept: FAMILY MEDICINE | Facility: CLINIC | Age: 60
End: 2020-12-03

## 2020-12-07 ENCOUNTER — HOSPITAL ENCOUNTER (OUTPATIENT)
Dept: RADIOLOGY | Facility: HOSPITAL | Age: 60
Discharge: HOME | End: 2020-12-07
Attending: INTERNAL MEDICINE
Payer: COMMERCIAL

## 2020-12-07 DIAGNOSIS — E07.89 THYROID PAIN: ICD-10-CM

## 2020-12-07 PROCEDURE — 76536 US EXAM OF HEAD AND NECK: CPT | Mod: TC

## 2020-12-07 PROCEDURE — 76536 US EXAM OF HEAD AND NECK: CPT | Mod: 26,,, | Performed by: RADIOLOGY

## 2020-12-07 PROCEDURE — 76536 US SOFT TISSUE HEAD NECK THYROID: ICD-10-PCS | Mod: 26,,, | Performed by: RADIOLOGY

## 2020-12-09 ENCOUNTER — PATIENT MESSAGE (OUTPATIENT)
Dept: FAMILY MEDICINE | Facility: CLINIC | Age: 60
End: 2020-12-09

## 2020-12-26 ENCOUNTER — HOSPITAL ENCOUNTER (EMERGENCY)
Facility: HOSPITAL | Age: 60
Discharge: HOME | End: 2020-12-26
Attending: EMERGENCY MEDICINE
Payer: COMMERCIAL

## 2020-12-26 VITALS
RESPIRATION RATE: 18 BRPM | HEART RATE: 92 BPM | HEIGHT: 67 IN | TEMPERATURE: 99 F | WEIGHT: 190 LBS | DIASTOLIC BLOOD PRESSURE: 69 MMHG | SYSTOLIC BLOOD PRESSURE: 128 MMHG | OXYGEN SATURATION: 98 % | BODY MASS INDEX: 29.82 KG/M2

## 2020-12-26 DIAGNOSIS — R52 PAIN: ICD-10-CM

## 2020-12-26 DIAGNOSIS — M79.671 FOOT PAIN, RIGHT: Primary | ICD-10-CM

## 2020-12-26 DIAGNOSIS — T14.90XA TRAUMA: ICD-10-CM

## 2020-12-26 DIAGNOSIS — J30.89 NON-SEASONAL ALLERGIC RHINITIS DUE TO OTHER ALLERGIC TRIGGER: ICD-10-CM

## 2020-12-26 PROCEDURE — 25000003 PHARM REV CODE 250: Performed by: EMERGENCY MEDICINE

## 2020-12-26 PROCEDURE — 99283 EMERGENCY DEPT VISIT LOW MDM: CPT | Mod: 25

## 2020-12-26 RX ORDER — ACETAMINOPHEN 325 MG/1
650 TABLET ORAL EVERY 6 HOURS PRN
Qty: 13 TABLET | Refills: 0 | Status: SHIPPED | OUTPATIENT
Start: 2020-12-26

## 2020-12-26 RX ORDER — ACETAMINOPHEN 325 MG/1
650 TABLET ORAL
Status: COMPLETED | OUTPATIENT
Start: 2020-12-26 | End: 2020-12-26

## 2020-12-26 RX ORDER — CYCLOBENZAPRINE HCL 10 MG
10 TABLET ORAL
Status: COMPLETED | OUTPATIENT
Start: 2020-12-26 | End: 2020-12-26

## 2020-12-26 RX ORDER — MONTELUKAST SODIUM 10 MG/1
10 TABLET ORAL NIGHTLY
Qty: 90 TABLET | Refills: 2 | Status: CANCELLED | OUTPATIENT
Start: 2020-12-26

## 2020-12-26 RX ORDER — CYCLOBENZAPRINE HCL 10 MG
10 TABLET ORAL 3 TIMES DAILY PRN
Qty: 15 TABLET | Refills: 0 | Status: SHIPPED | OUTPATIENT
Start: 2020-12-26 | End: 2020-12-31

## 2020-12-26 RX ORDER — ACETAMINOPHEN 325 MG/1
650 TABLET ORAL EVERY 6 HOURS PRN
Qty: 13 TABLET | Refills: 0 | Status: SHIPPED | OUTPATIENT
Start: 2020-12-26 | End: 2021-08-04

## 2020-12-26 RX ORDER — ACETAMINOPHEN 325 MG/1
650 TABLET ORAL EVERY 6 HOURS PRN
Qty: 13 TABLET | Refills: 0 | Status: SHIPPED | OUTPATIENT
Start: 2020-12-26 | End: 2020-12-26 | Stop reason: SDUPTHER

## 2020-12-26 RX ADMIN — CYCLOBENZAPRINE 10 MG: 10 TABLET, FILM COATED ORAL at 08:12

## 2020-12-26 RX ADMIN — ACETAMINOPHEN 650 MG: 325 TABLET ORAL at 08:12

## 2020-12-26 NOTE — Clinical Note
"Alyce "Alyce" Yudith was seen and treated in our emergency department on 12/26/2020.  She may return to work on 12/29/2020.       If you have any questions or concerns, please don't hesitate to call.      Abel Medrano MD"
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
## 2020-12-27 NOTE — DISCHARGE INSTRUCTIONS
Thank you for coming to our Emergency Department today. It is important to remember that some problems are difficult to diagnose and may not be found during your first visit. Be sure to follow up with your primary care doctor and review any labs/imaging that was performed with them. If you do not have a primary care doctor, you may contact the one listed on your discharge paperwork or you may also call the [facility] Appointment Desk at [phone] to schedule an appointment with one.     All medications may potentially have side effects and it is impossible to predict which medications may give you side effects. If you feel that you are having a negative effect of any medication you should immediately stop taking them and seek medical attention.    Return to the ER with any questions/concerns, new/concerning symptoms, worsening or failure to improve. Do not drive or make any important decisions for 24 hours if you have received any pain medications, sedatives or mood altering drugs during your ER visit.

## 2020-12-28 ENCOUNTER — OFFICE VISIT (OUTPATIENT)
Dept: FAMILY MEDICINE | Facility: CLINIC | Age: 60
End: 2020-12-28
Payer: COMMERCIAL

## 2020-12-28 VITALS
WEIGHT: 192.88 LBS | RESPIRATION RATE: 16 BRPM | HEIGHT: 67 IN | TEMPERATURE: 98 F | BODY MASS INDEX: 30.27 KG/M2 | DIASTOLIC BLOOD PRESSURE: 80 MMHG | OXYGEN SATURATION: 96 % | SYSTOLIC BLOOD PRESSURE: 120 MMHG | HEART RATE: 112 BPM

## 2020-12-28 DIAGNOSIS — M79.671 FOOT PAIN, RIGHT: Primary | ICD-10-CM

## 2020-12-28 PROCEDURE — 3074F SYST BP LT 130 MM HG: CPT | Mod: CPTII,S$GLB,, | Performed by: INTERNAL MEDICINE

## 2020-12-28 PROCEDURE — 3008F PR BODY MASS INDEX (BMI) DOCUMENTED: ICD-10-PCS | Mod: CPTII,S$GLB,, | Performed by: INTERNAL MEDICINE

## 2020-12-28 PROCEDURE — 99999 PR PBB SHADOW E&M-EST. PATIENT-LVL V: ICD-10-PCS | Mod: PBBFAC,,, | Performed by: INTERNAL MEDICINE

## 2020-12-28 PROCEDURE — 99213 OFFICE O/P EST LOW 20 MIN: CPT | Mod: S$GLB,,, | Performed by: INTERNAL MEDICINE

## 2020-12-28 PROCEDURE — 3008F BODY MASS INDEX DOCD: CPT | Mod: CPTII,S$GLB,, | Performed by: INTERNAL MEDICINE

## 2020-12-28 PROCEDURE — 99999 PR PBB SHADOW E&M-EST. PATIENT-LVL V: CPT | Mod: PBBFAC,,, | Performed by: INTERNAL MEDICINE

## 2020-12-28 PROCEDURE — 3074F PR MOST RECENT SYSTOLIC BLOOD PRESSURE < 130 MM HG: ICD-10-PCS | Mod: CPTII,S$GLB,, | Performed by: INTERNAL MEDICINE

## 2020-12-28 PROCEDURE — 3079F DIAST BP 80-89 MM HG: CPT | Mod: CPTII,S$GLB,, | Performed by: INTERNAL MEDICINE

## 2020-12-28 PROCEDURE — 3079F PR MOST RECENT DIASTOLIC BLOOD PRESSURE 80-89 MM HG: ICD-10-PCS | Mod: CPTII,S$GLB,, | Performed by: INTERNAL MEDICINE

## 2020-12-28 PROCEDURE — 1125F AMNT PAIN NOTED PAIN PRSNT: CPT | Mod: S$GLB,,, | Performed by: INTERNAL MEDICINE

## 2020-12-28 PROCEDURE — 99213 PR OFFICE/OUTPT VISIT, EST, LEVL III, 20-29 MIN: ICD-10-PCS | Mod: S$GLB,,, | Performed by: INTERNAL MEDICINE

## 2020-12-28 PROCEDURE — 1125F PR PAIN SEVERITY QUANTIFIED, PAIN PRESENT: ICD-10-PCS | Mod: S$GLB,,, | Performed by: INTERNAL MEDICINE
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
## 2020-12-28 NOTE — LETTER
[address]  ? [place], [address] ? Phone [phone] ? Fax [phone]           Return to Work    Patient: Alyce Zurita  YOB: 1960   Date: 12/28/2020      To Whom It May Concern:     Alyce Zurita was seen in my office on 12/28/2020. She will go back to work tomorrow.       If you have any questions or concerns, or if I can be of further assistance, please do not hesitate to contact me.     Sincerely,    Toshia Cintron MD

## 2021-01-08 ENCOUNTER — OFFICE VISIT (OUTPATIENT)
Dept: PAIN MEDICINE | Facility: CLINIC | Age: 61
End: 2021-01-08
Payer: COMMERCIAL

## 2021-01-08 VITALS
WEIGHT: 195.19 LBS | TEMPERATURE: 99 F | HEART RATE: 76 BPM | DIASTOLIC BLOOD PRESSURE: 66 MMHG | OXYGEN SATURATION: 95 % | SYSTOLIC BLOOD PRESSURE: 140 MMHG | BODY MASS INDEX: 30.64 KG/M2 | HEIGHT: 67 IN

## 2021-01-08 DIAGNOSIS — M17.0 PRIMARY OSTEOARTHRITIS OF BOTH KNEES: Primary | ICD-10-CM

## 2021-01-08 DIAGNOSIS — M25.561 CHRONIC PAIN OF BOTH KNEES: ICD-10-CM

## 2021-01-08 DIAGNOSIS — M25.562 CHRONIC PAIN OF BOTH KNEES: ICD-10-CM

## 2021-01-08 DIAGNOSIS — G89.29 CHRONIC PAIN OF BOTH KNEES: ICD-10-CM

## 2021-01-08 PROCEDURE — 3077F PR MOST RECENT SYSTOLIC BLOOD PRESSURE >= 140 MM HG: ICD-10-PCS | Mod: CPTII,S$GLB,, | Performed by: PAIN MEDICINE

## 2021-01-08 PROCEDURE — 99214 OFFICE O/P EST MOD 30 MIN: CPT | Mod: 25,S$GLB,, | Performed by: PAIN MEDICINE

## 2021-01-08 PROCEDURE — 20610 DRAIN/INJ JOINT/BURSA W/O US: CPT | Mod: 50,S$GLB,, | Performed by: PAIN MEDICINE

## 2021-01-08 PROCEDURE — 3077F SYST BP >= 140 MM HG: CPT | Mod: CPTII,S$GLB,, | Performed by: PAIN MEDICINE

## 2021-01-08 PROCEDURE — 3008F BODY MASS INDEX DOCD: CPT | Mod: CPTII,S$GLB,, | Performed by: PAIN MEDICINE

## 2021-01-08 PROCEDURE — 99214 PR OFFICE/OUTPT VISIT, EST, LEVL IV, 30-39 MIN: ICD-10-PCS | Mod: 25,S$GLB,, | Performed by: PAIN MEDICINE

## 2021-01-08 PROCEDURE — 99999 PR PBB SHADOW E&M-EST. PATIENT-LVL III: CPT | Mod: PBBFAC,,, | Performed by: PAIN MEDICINE

## 2021-01-08 PROCEDURE — 1125F PR PAIN SEVERITY QUANTIFIED, PAIN PRESENT: ICD-10-PCS | Mod: S$GLB,,, | Performed by: PAIN MEDICINE

## 2021-01-08 PROCEDURE — 99999 PR PBB SHADOW E&M-EST. PATIENT-LVL III: ICD-10-PCS | Mod: PBBFAC,,, | Performed by: PAIN MEDICINE

## 2021-01-08 PROCEDURE — 3008F PR BODY MASS INDEX (BMI) DOCUMENTED: ICD-10-PCS | Mod: CPTII,S$GLB,, | Performed by: PAIN MEDICINE

## 2021-01-08 PROCEDURE — 3078F PR MOST RECENT DIASTOLIC BLOOD PRESSURE < 80 MM HG: ICD-10-PCS | Mod: CPTII,S$GLB,, | Performed by: PAIN MEDICINE

## 2021-01-08 PROCEDURE — 1125F AMNT PAIN NOTED PAIN PRSNT: CPT | Mod: S$GLB,,, | Performed by: PAIN MEDICINE

## 2021-01-08 PROCEDURE — 20610 PR DRAIN/INJECT LARGE JOINT/BURSA: ICD-10-PCS | Mod: 50,S$GLB,, | Performed by: PAIN MEDICINE

## 2021-01-08 PROCEDURE — 3078F DIAST BP <80 MM HG: CPT | Mod: CPTII,S$GLB,, | Performed by: PAIN MEDICINE

## 2021-01-08 RX ORDER — TRIAMCINOLONE ACETONIDE 40 MG/ML
40 INJECTION, SUSPENSION INTRA-ARTICULAR; INTRAMUSCULAR
Status: COMPLETED | OUTPATIENT
Start: 2021-01-08 | End: 2021-01-08

## 2021-01-08 RX ADMIN — TRIAMCINOLONE ACETONIDE 40 MG: 40 INJECTION, SUSPENSION INTRA-ARTICULAR; INTRAMUSCULAR at 10:01

## 2021-01-15 DIAGNOSIS — F51.01 PRIMARY INSOMNIA: ICD-10-CM

## 2021-01-19 RX ORDER — TRAZODONE HYDROCHLORIDE 150 MG/1
75 TABLET ORAL NIGHTLY
Qty: 45 TABLET | Refills: 1 | Status: SHIPPED | OUTPATIENT
Start: 2021-01-19 | End: 2021-03-16 | Stop reason: SDUPTHER

## 2021-01-22 ENCOUNTER — OFFICE VISIT (OUTPATIENT)
Dept: UROLOGY | Facility: CLINIC | Age: 61
End: 2021-01-22
Payer: COMMERCIAL

## 2021-01-22 ENCOUNTER — OFFICE VISIT (OUTPATIENT)
Dept: ORTHOPEDICS | Facility: CLINIC | Age: 61
End: 2021-01-22
Payer: COMMERCIAL

## 2021-01-22 VITALS
HEIGHT: 67 IN | SYSTOLIC BLOOD PRESSURE: 132 MMHG | WEIGHT: 194.25 LBS | RESPIRATION RATE: 18 BRPM | DIASTOLIC BLOOD PRESSURE: 70 MMHG | BODY MASS INDEX: 30.49 KG/M2

## 2021-01-22 VITALS
BODY MASS INDEX: 30.56 KG/M2 | HEIGHT: 67 IN | SYSTOLIC BLOOD PRESSURE: 130 MMHG | WEIGHT: 194.69 LBS | DIASTOLIC BLOOD PRESSURE: 72 MMHG

## 2021-01-22 DIAGNOSIS — M65.332 TRIGGER FINGER, LEFT MIDDLE FINGER: ICD-10-CM

## 2021-01-22 DIAGNOSIS — N39.41 URGENCY INCONTINENCE: ICD-10-CM

## 2021-01-22 DIAGNOSIS — M65.321 TRIGGER FINGER, RIGHT INDEX FINGER: ICD-10-CM

## 2021-01-22 DIAGNOSIS — R10.2 PELVIC PAIN IN FEMALE: ICD-10-CM

## 2021-01-22 DIAGNOSIS — R35.0 URINARY FREQUENCY: Primary | ICD-10-CM

## 2021-01-22 DIAGNOSIS — M65.322 TRIGGER INDEX FINGER OF LEFT HAND: ICD-10-CM

## 2021-01-22 DIAGNOSIS — M65.341 TRIGGER FINGER, RIGHT RING FINGER: ICD-10-CM

## 2021-01-22 DIAGNOSIS — R30.0 DYSURIA: ICD-10-CM

## 2021-01-22 DIAGNOSIS — M65.321 TRIGGER INDEX FINGER OF RIGHT HAND: Primary | ICD-10-CM

## 2021-01-22 DIAGNOSIS — M65.342 TRIGGER RING FINGER OF LEFT HAND: ICD-10-CM

## 2021-01-22 PROCEDURE — 20550 TENDON SHEATH: ICD-10-PCS | Mod: 50,S$GLB,, | Performed by: ORTHOPAEDIC SURGERY

## 2021-01-22 PROCEDURE — 3008F PR BODY MASS INDEX (BMI) DOCUMENTED: ICD-10-PCS | Mod: CPTII,S$GLB,, | Performed by: ORTHOPAEDIC SURGERY

## 2021-01-22 PROCEDURE — 3075F PR MOST RECENT SYSTOLIC BLOOD PRESS GE 130-139MM HG: ICD-10-PCS | Mod: CPTII,S$GLB,, | Performed by: UROLOGY

## 2021-01-22 PROCEDURE — 1125F PR PAIN SEVERITY QUANTIFIED, PAIN PRESENT: ICD-10-PCS | Mod: S$GLB,,, | Performed by: ORTHOPAEDIC SURGERY

## 2021-01-22 PROCEDURE — 3008F BODY MASS INDEX DOCD: CPT | Mod: CPTII,S$GLB,, | Performed by: UROLOGY

## 2021-01-22 PROCEDURE — 1125F AMNT PAIN NOTED PAIN PRSNT: CPT | Mod: S$GLB,,, | Performed by: ORTHOPAEDIC SURGERY

## 2021-01-22 PROCEDURE — 99213 OFFICE O/P EST LOW 20 MIN: CPT | Mod: 25,S$GLB,, | Performed by: ORTHOPAEDIC SURGERY

## 2021-01-22 PROCEDURE — 99213 PR OFFICE/OUTPT VISIT, EST, LEVL III, 20-29 MIN: ICD-10-PCS | Mod: 25,S$GLB,, | Performed by: UROLOGY

## 2021-01-22 PROCEDURE — 99999 PR PBB SHADOW E&M-EST. PATIENT-LVL V: CPT | Mod: PBBFAC,,, | Performed by: ORTHOPAEDIC SURGERY

## 2021-01-22 PROCEDURE — 99999 PR PBB SHADOW E&M-EST. PATIENT-LVL IV: CPT | Mod: PBBFAC,,, | Performed by: UROLOGY

## 2021-01-22 PROCEDURE — 3078F PR MOST RECENT DIASTOLIC BLOOD PRESSURE < 80 MM HG: ICD-10-PCS | Mod: CPTII,S$GLB,, | Performed by: UROLOGY

## 2021-01-22 PROCEDURE — 99213 PR OFFICE/OUTPT VISIT, EST, LEVL III, 20-29 MIN: ICD-10-PCS | Mod: 25,S$GLB,, | Performed by: ORTHOPAEDIC SURGERY

## 2021-01-22 PROCEDURE — 3075F PR MOST RECENT SYSTOLIC BLOOD PRESS GE 130-139MM HG: ICD-10-PCS | Mod: CPTII,S$GLB,, | Performed by: ORTHOPAEDIC SURGERY

## 2021-01-22 PROCEDURE — 3078F DIAST BP <80 MM HG: CPT | Mod: CPTII,S$GLB,, | Performed by: UROLOGY

## 2021-01-22 PROCEDURE — 1126F AMNT PAIN NOTED NONE PRSNT: CPT | Mod: S$GLB,,, | Performed by: UROLOGY

## 2021-01-22 PROCEDURE — 3078F PR MOST RECENT DIASTOLIC BLOOD PRESSURE < 80 MM HG: ICD-10-PCS | Mod: CPTII,S$GLB,, | Performed by: ORTHOPAEDIC SURGERY

## 2021-01-22 PROCEDURE — 1126F PR PAIN SEVERITY QUANTIFIED, NO PAIN PRESENT: ICD-10-PCS | Mod: S$GLB,,, | Performed by: UROLOGY

## 2021-01-22 PROCEDURE — 81001 POCT URINALYSIS, DIPSTICK OR TABLET REAGENT, AUTOMATED, WITH MICROSCOP: ICD-10-PCS | Mod: S$GLB,,, | Performed by: UROLOGY

## 2021-01-22 PROCEDURE — 99999 PR PBB SHADOW E&M-EST. PATIENT-LVL IV: ICD-10-PCS | Mod: PBBFAC,,, | Performed by: UROLOGY

## 2021-01-22 PROCEDURE — 99213 OFFICE O/P EST LOW 20 MIN: CPT | Mod: 25,S$GLB,, | Performed by: UROLOGY

## 2021-01-22 PROCEDURE — 3008F PR BODY MASS INDEX (BMI) DOCUMENTED: ICD-10-PCS | Mod: CPTII,S$GLB,, | Performed by: UROLOGY

## 2021-01-22 PROCEDURE — 20550 NJX 1 TENDON SHEATH/LIGAMENT: CPT | Mod: 50,S$GLB,, | Performed by: ORTHOPAEDIC SURGERY

## 2021-01-22 PROCEDURE — 3075F SYST BP GE 130 - 139MM HG: CPT | Mod: CPTII,S$GLB,, | Performed by: UROLOGY

## 2021-01-22 PROCEDURE — 3075F SYST BP GE 130 - 139MM HG: CPT | Mod: CPTII,S$GLB,, | Performed by: ORTHOPAEDIC SURGERY

## 2021-01-22 PROCEDURE — 81001 URINALYSIS AUTO W/SCOPE: CPT | Mod: S$GLB,,, | Performed by: UROLOGY

## 2021-01-22 PROCEDURE — 3008F BODY MASS INDEX DOCD: CPT | Mod: CPTII,S$GLB,, | Performed by: ORTHOPAEDIC SURGERY

## 2021-01-22 PROCEDURE — 3078F DIAST BP <80 MM HG: CPT | Mod: CPTII,S$GLB,, | Performed by: ORTHOPAEDIC SURGERY

## 2021-01-22 PROCEDURE — 99999 PR PBB SHADOW E&M-EST. PATIENT-LVL V: ICD-10-PCS | Mod: PBBFAC,,, | Performed by: ORTHOPAEDIC SURGERY

## 2021-01-22 RX ORDER — TRIAMCINOLONE ACETONIDE 40 MG/ML
40 INJECTION, SUSPENSION INTRA-ARTICULAR; INTRAMUSCULAR
Status: DISCONTINUED | OUTPATIENT
Start: 2021-01-22 | End: 2021-01-22 | Stop reason: HOSPADM

## 2021-01-22 RX ORDER — ECHINACEA 380 MG
1 CAPSULE ORAL DAILY
Qty: 30 CAPSULE | Refills: 11 | Status: SHIPPED | OUTPATIENT
Start: 2021-01-22

## 2021-01-22 RX ORDER — HYDROXYZINE HYDROCHLORIDE 25 MG/1
25 TABLET, FILM COATED ORAL NIGHTLY
Qty: 30 TABLET | Refills: 11 | Status: SHIPPED | OUTPATIENT
Start: 2021-01-22

## 2021-01-22 RX ADMIN — TRIAMCINOLONE ACETONIDE 40 MG: 40 INJECTION, SUSPENSION INTRA-ARTICULAR; INTRAMUSCULAR at 11:01

## 2021-03-08 ENCOUNTER — TELEPHONE (OUTPATIENT)
Dept: FAMILY MEDICINE | Facility: CLINIC | Age: 61
End: 2021-03-08

## 2021-03-09 ENCOUNTER — IMMUNIZATION (OUTPATIENT)
Dept: PRIMARY CARE CLINIC | Facility: CLINIC | Age: 61
End: 2021-03-09
Payer: COMMERCIAL

## 2021-03-09 DIAGNOSIS — Z23 NEED FOR VACCINATION: Primary | ICD-10-CM

## 2021-03-09 PROCEDURE — 0001A COVID-19, MRNA, LNP-S, PF, 30 MCG/0.3 ML DOSE VACCINE: ICD-10-PCS | Mod: CV19,S$GLB,, | Performed by: INTERNAL MEDICINE

## 2021-03-09 PROCEDURE — 0001A COVID-19, MRNA, LNP-S, PF, 30 MCG/0.3 ML DOSE VACCINE: CPT | Mod: CV19,S$GLB,, | Performed by: INTERNAL MEDICINE

## 2021-03-09 PROCEDURE — 91300 COVID-19, MRNA, LNP-S, PF, 30 MCG/0.3 ML DOSE VACCINE: ICD-10-PCS | Mod: S$GLB,,, | Performed by: INTERNAL MEDICINE

## 2021-03-09 PROCEDURE — 91300 COVID-19, MRNA, LNP-S, PF, 30 MCG/0.3 ML DOSE VACCINE: CPT | Mod: S$GLB,,, | Performed by: INTERNAL MEDICINE

## 2021-03-10 DIAGNOSIS — Z12.31 OTHER SCREENING MAMMOGRAM: ICD-10-CM

## 2021-03-16 ENCOUNTER — OFFICE VISIT (OUTPATIENT)
Dept: FAMILY MEDICINE | Facility: CLINIC | Age: 61
End: 2021-03-16
Payer: COMMERCIAL

## 2021-03-16 VITALS
TEMPERATURE: 99 F | HEART RATE: 73 BPM | HEIGHT: 67 IN | OXYGEN SATURATION: 96 % | RESPIRATION RATE: 16 BRPM | BODY MASS INDEX: 29.97 KG/M2 | WEIGHT: 190.94 LBS | SYSTOLIC BLOOD PRESSURE: 110 MMHG | DIASTOLIC BLOOD PRESSURE: 70 MMHG

## 2021-03-16 DIAGNOSIS — M50.30 DDD (DEGENERATIVE DISC DISEASE), CERVICAL: ICD-10-CM

## 2021-03-16 DIAGNOSIS — F51.01 PRIMARY INSOMNIA: ICD-10-CM

## 2021-03-16 DIAGNOSIS — M25.562 PAIN IN BOTH KNEES, UNSPECIFIED CHRONICITY: ICD-10-CM

## 2021-03-16 DIAGNOSIS — M25.561 PAIN IN BOTH KNEES, UNSPECIFIED CHRONICITY: ICD-10-CM

## 2021-03-16 DIAGNOSIS — K21.9 GASTROESOPHAGEAL REFLUX DISEASE WITHOUT ESOPHAGITIS: ICD-10-CM

## 2021-03-16 PROCEDURE — 99999 PR PBB SHADOW E&M-EST. PATIENT-LVL V: ICD-10-PCS | Mod: PBBFAC,,, | Performed by: INTERNAL MEDICINE

## 2021-03-16 PROCEDURE — 3074F SYST BP LT 130 MM HG: CPT | Mod: CPTII,S$GLB,, | Performed by: INTERNAL MEDICINE

## 2021-03-16 PROCEDURE — 3078F DIAST BP <80 MM HG: CPT | Mod: CPTII,S$GLB,, | Performed by: INTERNAL MEDICINE

## 2021-03-16 PROCEDURE — 3078F PR MOST RECENT DIASTOLIC BLOOD PRESSURE < 80 MM HG: ICD-10-PCS | Mod: CPTII,S$GLB,, | Performed by: INTERNAL MEDICINE

## 2021-03-16 PROCEDURE — 3008F PR BODY MASS INDEX (BMI) DOCUMENTED: ICD-10-PCS | Mod: CPTII,S$GLB,, | Performed by: INTERNAL MEDICINE

## 2021-03-16 PROCEDURE — 3074F PR MOST RECENT SYSTOLIC BLOOD PRESSURE < 130 MM HG: ICD-10-PCS | Mod: CPTII,S$GLB,, | Performed by: INTERNAL MEDICINE

## 2021-03-16 PROCEDURE — 1126F AMNT PAIN NOTED NONE PRSNT: CPT | Mod: S$GLB,,, | Performed by: INTERNAL MEDICINE

## 2021-03-16 PROCEDURE — 3008F BODY MASS INDEX DOCD: CPT | Mod: CPTII,S$GLB,, | Performed by: INTERNAL MEDICINE

## 2021-03-16 PROCEDURE — 1126F PR PAIN SEVERITY QUANTIFIED, NO PAIN PRESENT: ICD-10-PCS | Mod: S$GLB,,, | Performed by: INTERNAL MEDICINE

## 2021-03-16 PROCEDURE — 99214 OFFICE O/P EST MOD 30 MIN: CPT | Mod: S$GLB,,, | Performed by: INTERNAL MEDICINE

## 2021-03-16 PROCEDURE — 99999 PR PBB SHADOW E&M-EST. PATIENT-LVL V: CPT | Mod: PBBFAC,,, | Performed by: INTERNAL MEDICINE

## 2021-03-16 PROCEDURE — 99214 PR OFFICE/OUTPT VISIT, EST, LEVL IV, 30-39 MIN: ICD-10-PCS | Mod: S$GLB,,, | Performed by: INTERNAL MEDICINE

## 2021-03-16 RX ORDER — OMEPRAZOLE 40 MG/1
40 CAPSULE, DELAYED RELEASE ORAL
Qty: 180 CAPSULE | Refills: 3 | Status: SHIPPED | OUTPATIENT
Start: 2021-03-16 | End: 2021-09-11 | Stop reason: SDUPTHER

## 2021-03-16 RX ORDER — TRAZODONE HYDROCHLORIDE 150 MG/1
150 TABLET ORAL NIGHTLY
Qty: 90 TABLET | Refills: 1 | Status: SHIPPED | OUTPATIENT
Start: 2021-03-16 | End: 2021-09-13

## 2021-03-20 DIAGNOSIS — M47.812 CERVICAL SPONDYLOSIS: ICD-10-CM

## 2021-03-20 DIAGNOSIS — J30.89 NON-SEASONAL ALLERGIC RHINITIS DUE TO OTHER ALLERGIC TRIGGER: ICD-10-CM

## 2021-03-20 DIAGNOSIS — R10.9 FUNCTIONAL ABDOMINAL PAIN SYNDROME: ICD-10-CM

## 2021-03-20 DIAGNOSIS — M50.30 DDD (DEGENERATIVE DISC DISEASE), CERVICAL: ICD-10-CM

## 2021-03-22 RX ORDER — MONTELUKAST SODIUM 10 MG/1
10 TABLET ORAL NIGHTLY
Qty: 90 TABLET | Refills: 0 | Status: SHIPPED | OUTPATIENT
Start: 2021-03-22 | End: 2021-06-21

## 2021-03-22 RX ORDER — ALPRAZOLAM 0.5 MG/1
0.5 TABLET ORAL NIGHTLY PRN
Qty: 7 TABLET | Refills: 0 | Status: SHIPPED | OUTPATIENT
Start: 2021-03-22 | End: 2021-05-21 | Stop reason: SDUPTHER

## 2021-03-22 RX ORDER — TIZANIDINE 4 MG/1
4 TABLET ORAL EVERY 8 HOURS
Qty: 90 TABLET | Refills: 11 | Status: SHIPPED | OUTPATIENT
Start: 2021-03-22 | End: 2022-03-22

## 2021-03-22 RX ORDER — DICYCLOMINE HYDROCHLORIDE 20 MG/1
20 TABLET ORAL 2 TIMES DAILY
Qty: 60 TABLET | Refills: 0 | OUTPATIENT
Start: 2021-03-22

## 2021-03-30 ENCOUNTER — IMMUNIZATION (OUTPATIENT)
Dept: PRIMARY CARE CLINIC | Facility: CLINIC | Age: 61
End: 2021-03-30
Payer: COMMERCIAL

## 2021-03-30 DIAGNOSIS — Z23 NEED FOR VACCINATION: Primary | ICD-10-CM

## 2021-03-30 PROCEDURE — 91300 COVID-19, MRNA, LNP-S, PF, 30 MCG/0.3 ML DOSE VACCINE: ICD-10-PCS | Mod: S$GLB,,, | Performed by: INTERNAL MEDICINE

## 2021-03-30 PROCEDURE — 91300 COVID-19, MRNA, LNP-S, PF, 30 MCG/0.3 ML DOSE VACCINE: CPT | Mod: S$GLB,,, | Performed by: INTERNAL MEDICINE

## 2021-03-30 PROCEDURE — 0002A COVID-19, MRNA, LNP-S, PF, 30 MCG/0.3 ML DOSE VACCINE: CPT | Mod: CV19,S$GLB,, | Performed by: INTERNAL MEDICINE

## 2021-03-30 PROCEDURE — 0002A COVID-19, MRNA, LNP-S, PF, 30 MCG/0.3 ML DOSE VACCINE: ICD-10-PCS | Mod: CV19,S$GLB,, | Performed by: INTERNAL MEDICINE

## 2021-04-05 ENCOUNTER — PATIENT MESSAGE (OUTPATIENT)
Dept: ADMINISTRATIVE | Facility: HOSPITAL | Age: 61
End: 2021-04-05

## 2021-04-16 ENCOUNTER — HOSPITAL ENCOUNTER (OUTPATIENT)
Dept: RADIOLOGY | Facility: HOSPITAL | Age: 61
Discharge: HOME | End: 2021-04-16
Attending: INTERNAL MEDICINE
Payer: COMMERCIAL

## 2021-04-16 VITALS — WEIGHT: 190.94 LBS | BODY MASS INDEX: 29.97 KG/M2 | HEIGHT: 67 IN

## 2021-04-16 DIAGNOSIS — Z12.31 OTHER SCREENING MAMMOGRAM: ICD-10-CM

## 2021-04-16 PROCEDURE — 77063 BREAST TOMOSYNTHESIS BI: CPT | Mod: 26,,, | Performed by: RADIOLOGY

## 2021-04-16 PROCEDURE — 77063 MAMMO DIGITAL SCREENING BILAT WITH TOMO: ICD-10-PCS | Mod: 26,,, | Performed by: RADIOLOGY

## 2021-04-16 PROCEDURE — 77067 SCR MAMMO BI INCL CAD: CPT | Mod: TC

## 2021-04-16 PROCEDURE — 77067 MAMMO DIGITAL SCREENING BILAT WITH TOMO: ICD-10-PCS | Mod: 26,,, | Performed by: RADIOLOGY

## 2021-04-16 PROCEDURE — 77067 SCR MAMMO BI INCL CAD: CPT | Mod: 26,,, | Performed by: RADIOLOGY

## 2021-04-20 DIAGNOSIS — R10.9 FUNCTIONAL ABDOMINAL PAIN SYNDROME: ICD-10-CM

## 2021-04-21 RX ORDER — DICYCLOMINE HYDROCHLORIDE 20 MG/1
20 TABLET ORAL 2 TIMES DAILY
Qty: 60 TABLET | Refills: 0 | Status: SHIPPED | OUTPATIENT
Start: 2021-04-21 | End: 2021-05-21 | Stop reason: SDUPTHER

## 2021-05-21 ENCOUNTER — OFFICE VISIT (OUTPATIENT)
Dept: FAMILY MEDICINE | Facility: CLINIC | Age: 61
End: 2021-05-21
Payer: COMMERCIAL

## 2021-05-21 VITALS
HEIGHT: 67 IN | DIASTOLIC BLOOD PRESSURE: 78 MMHG | OXYGEN SATURATION: 97 % | HEART RATE: 57 BPM | BODY MASS INDEX: 29.97 KG/M2 | TEMPERATURE: 98 F | WEIGHT: 190.94 LBS | SYSTOLIC BLOOD PRESSURE: 122 MMHG | RESPIRATION RATE: 20 BRPM

## 2021-05-21 DIAGNOSIS — F41.9 ANXIETY: ICD-10-CM

## 2021-05-21 DIAGNOSIS — J30.9 ALLERGIC RHINITIS, UNSPECIFIED SEASONALITY, UNSPECIFIED TRIGGER: Primary | ICD-10-CM

## 2021-05-21 DIAGNOSIS — R10.9 FUNCTIONAL ABDOMINAL PAIN SYNDROME: ICD-10-CM

## 2021-05-21 PROCEDURE — 99999 PR PBB SHADOW E&M-EST. PATIENT-LVL V: ICD-10-PCS | Mod: PBBFAC,,, | Performed by: INTERNAL MEDICINE

## 2021-05-21 PROCEDURE — 3008F BODY MASS INDEX DOCD: CPT | Mod: CPTII,S$GLB,, | Performed by: INTERNAL MEDICINE

## 2021-05-21 PROCEDURE — 1125F PR PAIN SEVERITY QUANTIFIED, PAIN PRESENT: ICD-10-PCS | Mod: S$GLB,,, | Performed by: INTERNAL MEDICINE

## 2021-05-21 PROCEDURE — 1125F AMNT PAIN NOTED PAIN PRSNT: CPT | Mod: S$GLB,,, | Performed by: INTERNAL MEDICINE

## 2021-05-21 PROCEDURE — 99999 PR PBB SHADOW E&M-EST. PATIENT-LVL V: CPT | Mod: PBBFAC,,, | Performed by: INTERNAL MEDICINE

## 2021-05-21 PROCEDURE — 96372 THER/PROPH/DIAG INJ SC/IM: CPT | Mod: S$GLB,,, | Performed by: INTERNAL MEDICINE

## 2021-05-21 PROCEDURE — 3008F PR BODY MASS INDEX (BMI) DOCUMENTED: ICD-10-PCS | Mod: CPTII,S$GLB,, | Performed by: INTERNAL MEDICINE

## 2021-05-21 PROCEDURE — 99214 PR OFFICE/OUTPT VISIT, EST, LEVL IV, 30-39 MIN: ICD-10-PCS | Mod: 25,S$GLB,, | Performed by: INTERNAL MEDICINE

## 2021-05-21 PROCEDURE — 99214 OFFICE O/P EST MOD 30 MIN: CPT | Mod: 25,S$GLB,, | Performed by: INTERNAL MEDICINE

## 2021-05-21 PROCEDURE — 96372 PR INJECTION,THERAP/PROPH/DIAG2ST, IM OR SUBCUT: ICD-10-PCS | Mod: S$GLB,,, | Performed by: INTERNAL MEDICINE

## 2021-05-21 RX ORDER — ALPRAZOLAM 0.5 MG/1
0.5 TABLET ORAL NIGHTLY PRN
Qty: 7 TABLET | Refills: 0 | Status: CANCELLED | OUTPATIENT
Start: 2021-05-21 | End: 2021-06-20

## 2021-05-21 RX ORDER — PREDNISONE 20 MG/1
20 TABLET ORAL DAILY
Qty: 6 TABLET | Refills: 0 | Status: SHIPPED | OUTPATIENT
Start: 2021-05-21 | End: 2021-05-24

## 2021-05-21 RX ORDER — DICYCLOMINE HYDROCHLORIDE 20 MG/1
20 TABLET ORAL 2 TIMES DAILY
Qty: 60 TABLET | Refills: 0 | Status: SHIPPED | OUTPATIENT
Start: 2021-05-21 | End: 2021-06-20 | Stop reason: SDUPTHER

## 2021-05-21 RX ORDER — ALPRAZOLAM 0.5 MG/1
0.5 TABLET ORAL NIGHTLY PRN
Qty: 7 TABLET | Refills: 0
Start: 2021-05-21 | End: 2021-09-11 | Stop reason: SDUPTHER

## 2021-05-28 ENCOUNTER — TELEPHONE (OUTPATIENT)
Dept: PAIN MEDICINE | Facility: CLINIC | Age: 61
End: 2021-05-28

## 2021-07-15 ENCOUNTER — PATIENT OUTREACH (OUTPATIENT)
Dept: ADMINISTRATIVE | Facility: OTHER | Age: 61
End: 2021-07-15

## 2021-07-21 DIAGNOSIS — R10.9 FUNCTIONAL ABDOMINAL PAIN SYNDROME: ICD-10-CM

## 2021-07-23 RX ORDER — DICYCLOMINE HYDROCHLORIDE 20 MG/1
20 TABLET ORAL 2 TIMES DAILY
Qty: 60 TABLET | Refills: 9 | Status: SHIPPED | OUTPATIENT
Start: 2021-07-23 | End: 2022-04-21 | Stop reason: SDUPTHER

## 2021-08-01 ENCOUNTER — OFFICE VISIT (OUTPATIENT)
Dept: URGENT CARE | Facility: CLINIC | Age: 61
End: 2021-08-01
Payer: COMMERCIAL

## 2021-08-01 VITALS
HEIGHT: 67 IN | DIASTOLIC BLOOD PRESSURE: 74 MMHG | RESPIRATION RATE: 18 BRPM | WEIGHT: 194 LBS | OXYGEN SATURATION: 96 % | SYSTOLIC BLOOD PRESSURE: 132 MMHG | HEART RATE: 65 BPM | BODY MASS INDEX: 30.45 KG/M2 | TEMPERATURE: 98 F

## 2021-08-01 DIAGNOSIS — J01.90 ACUTE BACTERIAL SINUSITIS: Primary | ICD-10-CM

## 2021-08-01 DIAGNOSIS — B96.89 ACUTE BACTERIAL SINUSITIS: Primary | ICD-10-CM

## 2021-08-01 LAB
CTP QC/QA: YES
SARS-COV-2 RDRP RESP QL NAA+PROBE: NEGATIVE

## 2021-08-01 PROCEDURE — 1160F RVW MEDS BY RX/DR IN RCRD: CPT | Mod: CPTII,S$GLB,, | Performed by: PHYSICIAN ASSISTANT

## 2021-08-01 PROCEDURE — 1125F PR PAIN SEVERITY QUANTIFIED, PAIN PRESENT: ICD-10-PCS | Mod: CPTII,S$GLB,, | Performed by: PHYSICIAN ASSISTANT

## 2021-08-01 PROCEDURE — 1159F MED LIST DOCD IN RCRD: CPT | Mod: CPTII,S$GLB,, | Performed by: PHYSICIAN ASSISTANT

## 2021-08-01 PROCEDURE — U0002: ICD-10-PCS | Mod: QW,S$GLB,, | Performed by: PHYSICIAN ASSISTANT

## 2021-08-01 PROCEDURE — 3008F BODY MASS INDEX DOCD: CPT | Mod: CPTII,S$GLB,, | Performed by: PHYSICIAN ASSISTANT

## 2021-08-01 PROCEDURE — 3078F DIAST BP <80 MM HG: CPT | Mod: CPTII,S$GLB,, | Performed by: PHYSICIAN ASSISTANT

## 2021-08-01 PROCEDURE — 1125F AMNT PAIN NOTED PAIN PRSNT: CPT | Mod: CPTII,S$GLB,, | Performed by: PHYSICIAN ASSISTANT

## 2021-08-01 PROCEDURE — 99214 PR OFFICE/OUTPT VISIT, EST, LEVL IV, 30-39 MIN: ICD-10-PCS | Mod: S$GLB,CS,, | Performed by: PHYSICIAN ASSISTANT

## 2021-08-01 PROCEDURE — 1160F PR REVIEW ALL MEDS BY PRESCRIBER/CLIN PHARMACIST DOCUMENTED: ICD-10-PCS | Mod: CPTII,S$GLB,, | Performed by: PHYSICIAN ASSISTANT

## 2021-08-01 PROCEDURE — 1159F PR MEDICATION LIST DOCUMENTED IN MEDICAL RECORD: ICD-10-PCS | Mod: CPTII,S$GLB,, | Performed by: PHYSICIAN ASSISTANT

## 2021-08-01 PROCEDURE — 3075F SYST BP GE 130 - 139MM HG: CPT | Mod: CPTII,S$GLB,, | Performed by: PHYSICIAN ASSISTANT

## 2021-08-01 PROCEDURE — 3075F PR MOST RECENT SYSTOLIC BLOOD PRESS GE 130-139MM HG: ICD-10-PCS | Mod: CPTII,S$GLB,, | Performed by: PHYSICIAN ASSISTANT

## 2021-08-01 PROCEDURE — 99214 OFFICE O/P EST MOD 30 MIN: CPT | Mod: S$GLB,CS,, | Performed by: PHYSICIAN ASSISTANT

## 2021-08-01 PROCEDURE — 3008F PR BODY MASS INDEX (BMI) DOCUMENTED: ICD-10-PCS | Mod: CPTII,S$GLB,, | Performed by: PHYSICIAN ASSISTANT

## 2021-08-01 PROCEDURE — U0002 COVID-19 LAB TEST NON-CDC: HCPCS | Mod: QW,S$GLB,, | Performed by: PHYSICIAN ASSISTANT

## 2021-08-01 PROCEDURE — 3078F PR MOST RECENT DIASTOLIC BLOOD PRESSURE < 80 MM HG: ICD-10-PCS | Mod: CPTII,S$GLB,, | Performed by: PHYSICIAN ASSISTANT

## 2021-08-01 RX ORDER — DOXYCYCLINE 100 MG/1
100 CAPSULE ORAL 2 TIMES DAILY
Qty: 14 CAPSULE | Refills: 0 | Status: SHIPPED | OUTPATIENT
Start: 2021-08-01 | End: 2021-08-08

## 2021-08-01 RX ORDER — PREDNISONE 20 MG/1
20 TABLET ORAL DAILY
Qty: 3 TABLET | Refills: 0 | Status: SHIPPED | OUTPATIENT
Start: 2021-08-01 | End: 2021-08-04

## 2021-08-03 ENCOUNTER — TELEPHONE (OUTPATIENT)
Dept: PAIN MEDICINE | Facility: CLINIC | Age: 61
End: 2021-08-03

## 2021-08-03 DIAGNOSIS — B37.9 ANTIBIOTIC-INDUCED YEAST INFECTION: ICD-10-CM

## 2021-08-03 DIAGNOSIS — T36.95XA ANTIBIOTIC-INDUCED YEAST INFECTION: ICD-10-CM

## 2021-08-03 RX ORDER — FLUCONAZOLE 150 MG/1
150 TABLET ORAL DAILY
Qty: 1 TABLET | Refills: 0 | Status: SHIPPED | OUTPATIENT
Start: 2021-08-03 | End: 2021-10-25 | Stop reason: SDUPTHER

## 2021-08-04 ENCOUNTER — OFFICE VISIT (OUTPATIENT)
Dept: PAIN MEDICINE | Facility: CLINIC | Age: 61
End: 2021-08-04
Payer: COMMERCIAL

## 2021-08-04 DIAGNOSIS — M47.816 LUMBAR SPONDYLOSIS: ICD-10-CM

## 2021-08-04 DIAGNOSIS — M51.36 DDD (DEGENERATIVE DISC DISEASE), LUMBAR: Primary | ICD-10-CM

## 2021-08-04 PROCEDURE — 1160F PR REVIEW ALL MEDS BY PRESCRIBER/CLIN PHARMACIST DOCUMENTED: ICD-10-PCS | Mod: CPTII,,, | Performed by: PAIN MEDICINE

## 2021-08-04 PROCEDURE — 1159F MED LIST DOCD IN RCRD: CPT | Mod: CPTII,,, | Performed by: PAIN MEDICINE

## 2021-08-04 PROCEDURE — 1160F RVW MEDS BY RX/DR IN RCRD: CPT | Mod: CPTII,,, | Performed by: PAIN MEDICINE

## 2021-08-04 PROCEDURE — 1159F PR MEDICATION LIST DOCUMENTED IN MEDICAL RECORD: ICD-10-PCS | Mod: CPTII,,, | Performed by: PAIN MEDICINE

## 2021-08-04 PROCEDURE — 99442 PR PHYSICIAN TELEPHONE EVALUATION 11-20 MIN: ICD-10-PCS | Mod: 95,,, | Performed by: PAIN MEDICINE

## 2021-08-04 PROCEDURE — 99442 PR PHYSICIAN TELEPHONE EVALUATION 11-20 MIN: CPT | Mod: 95,,, | Performed by: PAIN MEDICINE

## 2021-08-04 RX ORDER — IBUPROFEN 600 MG/1
600 TABLET ORAL EVERY 6 HOURS PRN
Qty: 120 TABLET | Refills: 2 | Status: SHIPPED | OUTPATIENT
Start: 2021-08-04 | End: 2021-11-02

## 2021-09-11 DIAGNOSIS — K21.9 GASTROESOPHAGEAL REFLUX DISEASE WITHOUT ESOPHAGITIS: ICD-10-CM

## 2021-09-11 DIAGNOSIS — F51.01 PRIMARY INSOMNIA: ICD-10-CM

## 2021-09-11 DIAGNOSIS — F41.9 ANXIETY: ICD-10-CM

## 2021-09-14 ENCOUNTER — TELEPHONE (OUTPATIENT)
Dept: PAIN MEDICINE | Facility: CLINIC | Age: 61
End: 2021-09-14

## 2021-09-16 ENCOUNTER — TELEPHONE (OUTPATIENT)
Dept: PAIN MEDICINE | Facility: CLINIC | Age: 61
End: 2021-09-16

## 2021-09-17 RX ORDER — TRAZODONE HYDROCHLORIDE 150 MG/1
150 TABLET ORAL NIGHTLY
Qty: 90 TABLET | Refills: 1 | Status: SHIPPED | OUTPATIENT
Start: 2021-09-17 | End: 2022-06-16

## 2021-09-17 RX ORDER — ALPRAZOLAM 0.5 MG/1
0.5 TABLET ORAL NIGHTLY PRN
Qty: 7 TABLET | Refills: 0 | Status: SHIPPED | OUTPATIENT
Start: 2021-09-17 | End: 2022-03-11 | Stop reason: SDUPTHER

## 2021-09-17 RX ORDER — OMEPRAZOLE 40 MG/1
40 CAPSULE, DELAYED RELEASE ORAL
Qty: 180 CAPSULE | Refills: 3 | Status: SHIPPED | OUTPATIENT
Start: 2021-09-17 | End: 2022-06-16

## 2021-09-17 RX ORDER — HYDROCHLOROTHIAZIDE 12.5 MG/1
12.5 CAPSULE ORAL DAILY
Qty: 90 CAPSULE | Refills: 1 | Status: SHIPPED | OUTPATIENT
Start: 2021-09-17 | End: 2022-06-16

## 2021-10-15 ENCOUNTER — TELEPHONE (OUTPATIENT)
Dept: PAIN MEDICINE | Facility: CLINIC | Age: 61
End: 2021-10-15

## 2021-10-18 ENCOUNTER — TELEPHONE (OUTPATIENT)
Dept: FAMILY MEDICINE | Facility: CLINIC | Age: 61
End: 2021-10-18

## 2021-10-22 ENCOUNTER — LAB VISIT (OUTPATIENT)
Dept: LAB | Facility: HOSPITAL | Age: 61
End: 2021-10-22
Attending: STUDENT IN AN ORGANIZED HEALTH CARE EDUCATION/TRAINING PROGRAM
Payer: COMMERCIAL

## 2021-10-22 ENCOUNTER — OFFICE VISIT (OUTPATIENT)
Dept: FAMILY MEDICINE | Facility: CLINIC | Age: 61
End: 2021-10-22
Payer: COMMERCIAL

## 2021-10-22 VITALS
WEIGHT: 187.38 LBS | SYSTOLIC BLOOD PRESSURE: 120 MMHG | BODY MASS INDEX: 29.41 KG/M2 | DIASTOLIC BLOOD PRESSURE: 70 MMHG | TEMPERATURE: 97 F | HEART RATE: 75 BPM | HEIGHT: 67 IN

## 2021-10-22 DIAGNOSIS — I25.10 CORONARY ARTERY DISEASE INVOLVING NATIVE CORONARY ARTERY OF NATIVE HEART WITHOUT ANGINA PECTORIS: ICD-10-CM

## 2021-10-22 DIAGNOSIS — M54.2 CHRONIC NECK PAIN: ICD-10-CM

## 2021-10-22 DIAGNOSIS — Z00.00 ANNUAL PHYSICAL EXAM: ICD-10-CM

## 2021-10-22 DIAGNOSIS — J32.1 CHRONIC FRONTAL SINUSITIS: Primary | ICD-10-CM

## 2021-10-22 DIAGNOSIS — Z51.81 MEDICATION MONITORING ENCOUNTER: ICD-10-CM

## 2021-10-22 DIAGNOSIS — D22.9 CHANGE IN MOLE: ICD-10-CM

## 2021-10-22 DIAGNOSIS — G89.29 CHRONIC NECK PAIN: ICD-10-CM

## 2021-10-22 DIAGNOSIS — J32.1 CHRONIC FRONTAL SINUSITIS: ICD-10-CM

## 2021-10-22 DIAGNOSIS — Z23 NEED FOR INFLUENZA VACCINATION: ICD-10-CM

## 2021-10-22 PROBLEM — R00.2 HEART PALPITATIONS: Status: ACTIVE | Noted: 2017-09-14

## 2021-10-22 PROBLEM — M81.0 OSTEOPOROSIS: Status: ACTIVE | Noted: 2018-03-20

## 2021-10-22 LAB
ALBUMIN SERPL BCP-MCNC: 3.7 G/DL (ref 3.5–5.2)
ALP SERPL-CCNC: 80 U/L (ref 55–135)
ALT SERPL W/O P-5'-P-CCNC: 17 U/L (ref 10–44)
ANION GAP SERPL CALC-SCNC: 14 MMOL/L (ref 8–16)
AST SERPL-CCNC: 22 U/L (ref 10–40)
BASOPHILS # BLD AUTO: 0.02 K/UL (ref 0–0.2)
BASOPHILS NFR BLD: 0.2 % (ref 0–1.9)
BILIRUB SERPL-MCNC: 0.4 MG/DL (ref 0.1–1)
BUN SERPL-MCNC: 10 MG/DL (ref 8–23)
CALCIUM SERPL-MCNC: 9.5 MG/DL (ref 8.7–10.5)
CHLORIDE SERPL-SCNC: 103 MMOL/L (ref 95–110)
CHOLEST SERPL-MCNC: 214 MG/DL (ref 120–199)
CHOLEST/HDLC SERPL: 5 {RATIO} (ref 2–5)
CO2 SERPL-SCNC: 24 MMOL/L (ref 23–29)
CREAT SERPL-MCNC: 0.8 MG/DL (ref 0.5–1.4)
DIFFERENTIAL METHOD: NORMAL
EOSINOPHIL # BLD AUTO: 0.1 K/UL (ref 0–0.5)
EOSINOPHIL NFR BLD: 1 % (ref 0–8)
ERYTHROCYTE [DISTWIDTH] IN BLOOD BY AUTOMATED COUNT: 13.5 % (ref 11.5–14.5)
EST. GFR  (AFRICAN AMERICAN): >60 ML/MIN/1.73 M^2
EST. GFR  (NON AFRICAN AMERICAN): >60 ML/MIN/1.73 M^2
GLUCOSE SERPL-MCNC: 92 MG/DL (ref 70–110)
HCT VFR BLD AUTO: 38 % (ref 37–48.5)
HDLC SERPL-MCNC: 43 MG/DL (ref 40–75)
HDLC SERPL: 20.1 % (ref 20–50)
HGB BLD-MCNC: 12.3 G/DL (ref 12–16)
IMM GRANULOCYTES # BLD AUTO: 0.03 K/UL (ref 0–0.04)
IMM GRANULOCYTES NFR BLD AUTO: 0.4 % (ref 0–0.5)
LDLC SERPL CALC-MCNC: 150 MG/DL (ref 63–159)
LYMPHOCYTES # BLD AUTO: 2.9 K/UL (ref 1–4.8)
LYMPHOCYTES NFR BLD: 35.6 % (ref 18–48)
MCH RBC QN AUTO: 28.8 PG (ref 27–31)
MCHC RBC AUTO-ENTMCNC: 32.4 G/DL (ref 32–36)
MCV RBC AUTO: 89 FL (ref 82–98)
MONOCYTES # BLD AUTO: 0.7 K/UL (ref 0.3–1)
MONOCYTES NFR BLD: 8.5 % (ref 4–15)
NEUTROPHILS # BLD AUTO: 4.4 K/UL (ref 1.8–7.7)
NEUTROPHILS NFR BLD: 54.3 % (ref 38–73)
NONHDLC SERPL-MCNC: 171 MG/DL
NRBC BLD-RTO: 0 /100 WBC
PLATELET # BLD AUTO: 285 K/UL (ref 150–450)
PMV BLD AUTO: 10.6 FL (ref 9.2–12.9)
POTASSIUM SERPL-SCNC: 3.7 MMOL/L (ref 3.5–5.1)
PROT SERPL-MCNC: 7.1 G/DL (ref 6–8.4)
RBC # BLD AUTO: 4.27 M/UL (ref 4–5.4)
SODIUM SERPL-SCNC: 141 MMOL/L (ref 136–145)
TRIGL SERPL-MCNC: 105 MG/DL (ref 30–150)
WBC # BLD AUTO: 8.01 K/UL (ref 3.9–12.7)

## 2021-10-22 PROCEDURE — 3008F BODY MASS INDEX DOCD: CPT | Mod: CPTII,S$GLB,, | Performed by: STUDENT IN AN ORGANIZED HEALTH CARE EDUCATION/TRAINING PROGRAM

## 2021-10-22 PROCEDURE — 90471 FLU VACCINE (QUAD) GREATER THAN OR EQUAL TO 3YO PRESERVATIVE FREE IM: ICD-10-PCS | Mod: S$GLB,,, | Performed by: STUDENT IN AN ORGANIZED HEALTH CARE EDUCATION/TRAINING PROGRAM

## 2021-10-22 PROCEDURE — 3074F SYST BP LT 130 MM HG: CPT | Mod: CPTII,S$GLB,, | Performed by: STUDENT IN AN ORGANIZED HEALTH CARE EDUCATION/TRAINING PROGRAM

## 2021-10-22 PROCEDURE — 1160F RVW MEDS BY RX/DR IN RCRD: CPT | Mod: CPTII,S$GLB,, | Performed by: STUDENT IN AN ORGANIZED HEALTH CARE EDUCATION/TRAINING PROGRAM

## 2021-10-22 PROCEDURE — 90686 IIV4 VACC NO PRSV 0.5 ML IM: CPT | Mod: S$GLB,,, | Performed by: STUDENT IN AN ORGANIZED HEALTH CARE EDUCATION/TRAINING PROGRAM

## 2021-10-22 PROCEDURE — 1159F PR MEDICATION LIST DOCUMENTED IN MEDICAL RECORD: ICD-10-PCS | Mod: CPTII,S$GLB,, | Performed by: STUDENT IN AN ORGANIZED HEALTH CARE EDUCATION/TRAINING PROGRAM

## 2021-10-22 PROCEDURE — 90686 FLU VACCINE (QUAD) GREATER THAN OR EQUAL TO 3YO PRESERVATIVE FREE IM: ICD-10-PCS | Mod: S$GLB,,, | Performed by: STUDENT IN AN ORGANIZED HEALTH CARE EDUCATION/TRAINING PROGRAM

## 2021-10-22 PROCEDURE — 90471 IMMUNIZATION ADMIN: CPT | Mod: S$GLB,,, | Performed by: STUDENT IN AN ORGANIZED HEALTH CARE EDUCATION/TRAINING PROGRAM

## 2021-10-22 PROCEDURE — 36415 COLL VENOUS BLD VENIPUNCTURE: CPT | Mod: PO | Performed by: STUDENT IN AN ORGANIZED HEALTH CARE EDUCATION/TRAINING PROGRAM

## 2021-10-22 PROCEDURE — 3078F PR MOST RECENT DIASTOLIC BLOOD PRESSURE < 80 MM HG: ICD-10-PCS | Mod: CPTII,S$GLB,, | Performed by: STUDENT IN AN ORGANIZED HEALTH CARE EDUCATION/TRAINING PROGRAM

## 2021-10-22 PROCEDURE — 80053 COMPREHEN METABOLIC PANEL: CPT | Performed by: STUDENT IN AN ORGANIZED HEALTH CARE EDUCATION/TRAINING PROGRAM

## 2021-10-22 PROCEDURE — 1160F PR REVIEW ALL MEDS BY PRESCRIBER/CLIN PHARMACIST DOCUMENTED: ICD-10-PCS | Mod: CPTII,S$GLB,, | Performed by: STUDENT IN AN ORGANIZED HEALTH CARE EDUCATION/TRAINING PROGRAM

## 2021-10-22 PROCEDURE — 99214 OFFICE O/P EST MOD 30 MIN: CPT | Mod: 25,S$GLB,, | Performed by: STUDENT IN AN ORGANIZED HEALTH CARE EDUCATION/TRAINING PROGRAM

## 2021-10-22 PROCEDURE — 3074F PR MOST RECENT SYSTOLIC BLOOD PRESSURE < 130 MM HG: ICD-10-PCS | Mod: CPTII,S$GLB,, | Performed by: STUDENT IN AN ORGANIZED HEALTH CARE EDUCATION/TRAINING PROGRAM

## 2021-10-22 PROCEDURE — 1159F MED LIST DOCD IN RCRD: CPT | Mod: CPTII,S$GLB,, | Performed by: STUDENT IN AN ORGANIZED HEALTH CARE EDUCATION/TRAINING PROGRAM

## 2021-10-22 PROCEDURE — 3078F DIAST BP <80 MM HG: CPT | Mod: CPTII,S$GLB,, | Performed by: STUDENT IN AN ORGANIZED HEALTH CARE EDUCATION/TRAINING PROGRAM

## 2021-10-22 PROCEDURE — 99214 PR OFFICE/OUTPT VISIT, EST, LEVL IV, 30-39 MIN: ICD-10-PCS | Mod: 25,S$GLB,, | Performed by: STUDENT IN AN ORGANIZED HEALTH CARE EDUCATION/TRAINING PROGRAM

## 2021-10-22 PROCEDURE — 99999 PR PBB SHADOW E&M-EST. PATIENT-LVL V: CPT | Mod: PBBFAC,,, | Performed by: STUDENT IN AN ORGANIZED HEALTH CARE EDUCATION/TRAINING PROGRAM

## 2021-10-22 PROCEDURE — 99999 PR PBB SHADOW E&M-EST. PATIENT-LVL V: ICD-10-PCS | Mod: PBBFAC,,, | Performed by: STUDENT IN AN ORGANIZED HEALTH CARE EDUCATION/TRAINING PROGRAM

## 2021-10-22 PROCEDURE — 3008F PR BODY MASS INDEX (BMI) DOCUMENTED: ICD-10-PCS | Mod: CPTII,S$GLB,, | Performed by: STUDENT IN AN ORGANIZED HEALTH CARE EDUCATION/TRAINING PROGRAM

## 2021-10-22 PROCEDURE — 85025 COMPLETE CBC W/AUTO DIFF WBC: CPT | Performed by: STUDENT IN AN ORGANIZED HEALTH CARE EDUCATION/TRAINING PROGRAM

## 2021-10-22 PROCEDURE — 80061 LIPID PANEL: CPT | Performed by: STUDENT IN AN ORGANIZED HEALTH CARE EDUCATION/TRAINING PROGRAM

## 2021-10-22 RX ORDER — METHYLPREDNISOLONE 4 MG/1
TABLET ORAL
Qty: 21 EACH | Refills: 0 | Status: SHIPPED | OUTPATIENT
Start: 2021-10-22 | End: 2021-10-25 | Stop reason: SDUPTHER

## 2021-10-22 RX ORDER — GABAPENTIN 300 MG/1
CAPSULE ORAL
Qty: 360 CAPSULE | Refills: 1 | Status: SHIPPED | OUTPATIENT
Start: 2021-10-22 | End: 2022-04-21 | Stop reason: SDUPTHER

## 2021-10-22 RX ORDER — AZITHROMYCIN 250 MG/1
TABLET, FILM COATED ORAL
Qty: 6 TABLET | Refills: 0 | Status: SHIPPED | OUTPATIENT
Start: 2021-10-22 | End: 2021-10-25 | Stop reason: SDUPTHER

## 2021-10-25 ENCOUNTER — TELEPHONE (OUTPATIENT)
Dept: FAMILY MEDICINE | Facility: CLINIC | Age: 61
End: 2021-10-25
Payer: COMMERCIAL

## 2021-10-25 DIAGNOSIS — B37.9 ANTIBIOTIC-INDUCED YEAST INFECTION: ICD-10-CM

## 2021-10-25 DIAGNOSIS — T36.95XA ANTIBIOTIC-INDUCED YEAST INFECTION: ICD-10-CM

## 2021-10-25 DIAGNOSIS — E78.2 MIXED HYPERLIPIDEMIA: Primary | ICD-10-CM

## 2021-10-25 DIAGNOSIS — J32.1 CHRONIC FRONTAL SINUSITIS: ICD-10-CM

## 2021-10-25 RX ORDER — AZITHROMYCIN 250 MG/1
TABLET, FILM COATED ORAL
Qty: 6 TABLET | Refills: 0 | Status: SHIPPED | OUTPATIENT
Start: 2021-10-25 | End: 2021-10-30

## 2021-10-25 RX ORDER — METHYLPREDNISOLONE 4 MG/1
TABLET ORAL
Qty: 21 EACH | Refills: 0 | Status: SHIPPED | OUTPATIENT
Start: 2021-10-25 | End: 2021-11-15

## 2021-10-25 RX ORDER — ATORVASTATIN CALCIUM 20 MG/1
20 TABLET, FILM COATED ORAL DAILY
Qty: 90 TABLET | Refills: 3 | Status: SHIPPED | OUTPATIENT
Start: 2021-10-25 | End: 2022-06-16

## 2021-10-25 RX ORDER — FLUCONAZOLE 150 MG/1
150 TABLET ORAL DAILY
Qty: 1 TABLET | Refills: 0 | Status: SHIPPED | OUTPATIENT
Start: 2021-10-25 | End: 2022-03-14

## 2021-11-03 ENCOUNTER — OFFICE VISIT (OUTPATIENT)
Dept: DERMATOLOGY | Facility: CLINIC | Age: 61
End: 2021-11-03
Payer: COMMERCIAL

## 2021-11-03 DIAGNOSIS — L82.1 SEBORRHEIC KERATOSES: ICD-10-CM

## 2021-11-03 DIAGNOSIS — L81.4 LENTIGO: ICD-10-CM

## 2021-11-03 DIAGNOSIS — L57.0 ACTINIC KERATOSIS: ICD-10-CM

## 2021-11-03 DIAGNOSIS — Z12.83 ENCOUNTER FOR SCREENING FOR MALIGNANT NEOPLASM OF SKIN: Primary | ICD-10-CM

## 2021-11-03 PROCEDURE — 99999 PR PBB SHADOW E&M-EST. PATIENT-LVL IV: ICD-10-PCS | Mod: PBBFAC,,, | Performed by: STUDENT IN AN ORGANIZED HEALTH CARE EDUCATION/TRAINING PROGRAM

## 2021-11-03 PROCEDURE — 1160F PR REVIEW ALL MEDS BY PRESCRIBER/CLIN PHARMACIST DOCUMENTED: ICD-10-PCS | Mod: CPTII,S$GLB,, | Performed by: STUDENT IN AN ORGANIZED HEALTH CARE EDUCATION/TRAINING PROGRAM

## 2021-11-03 PROCEDURE — 17000 DESTRUCT PREMALG LESION: CPT | Mod: S$GLB,,, | Performed by: STUDENT IN AN ORGANIZED HEALTH CARE EDUCATION/TRAINING PROGRAM

## 2021-11-03 PROCEDURE — 99203 PR OFFICE/OUTPT VISIT, NEW, LEVL III, 30-44 MIN: ICD-10-PCS | Mod: 25,S$GLB,, | Performed by: STUDENT IN AN ORGANIZED HEALTH CARE EDUCATION/TRAINING PROGRAM

## 2021-11-03 PROCEDURE — 1159F PR MEDICATION LIST DOCUMENTED IN MEDICAL RECORD: ICD-10-PCS | Mod: CPTII,S$GLB,, | Performed by: STUDENT IN AN ORGANIZED HEALTH CARE EDUCATION/TRAINING PROGRAM

## 2021-11-03 PROCEDURE — 99203 OFFICE O/P NEW LOW 30 MIN: CPT | Mod: 25,S$GLB,, | Performed by: STUDENT IN AN ORGANIZED HEALTH CARE EDUCATION/TRAINING PROGRAM

## 2021-11-03 PROCEDURE — 99999 PR PBB SHADOW E&M-EST. PATIENT-LVL IV: CPT | Mod: PBBFAC,,, | Performed by: STUDENT IN AN ORGANIZED HEALTH CARE EDUCATION/TRAINING PROGRAM

## 2021-11-03 PROCEDURE — 17000 PR DESTRUCTION(LASER SURGERY,CRYOSURGERY,CHEMOSURGERY),PREMALIGNANT LESIONS,FIRST LESION: ICD-10-PCS | Mod: S$GLB,,, | Performed by: STUDENT IN AN ORGANIZED HEALTH CARE EDUCATION/TRAINING PROGRAM

## 2021-11-03 PROCEDURE — 1159F MED LIST DOCD IN RCRD: CPT | Mod: CPTII,S$GLB,, | Performed by: STUDENT IN AN ORGANIZED HEALTH CARE EDUCATION/TRAINING PROGRAM

## 2021-11-03 PROCEDURE — 1160F RVW MEDS BY RX/DR IN RCRD: CPT | Mod: CPTII,S$GLB,, | Performed by: STUDENT IN AN ORGANIZED HEALTH CARE EDUCATION/TRAINING PROGRAM

## 2021-11-29 ENCOUNTER — APPOINTMENT (OUTPATIENT)
Dept: RADIOLOGY | Facility: HOSPITAL | Age: 61
End: 2021-11-29
Attending: ORTHOPAEDIC SURGERY
Payer: COMMERCIAL

## 2021-11-29 ENCOUNTER — OFFICE VISIT (OUTPATIENT)
Dept: ORTHOPEDICS | Facility: CLINIC | Age: 61
End: 2021-11-29
Payer: COMMERCIAL

## 2021-11-29 VITALS
SYSTOLIC BLOOD PRESSURE: 126 MMHG | HEIGHT: 67 IN | OXYGEN SATURATION: 98 % | BODY MASS INDEX: 29.35 KG/M2 | HEART RATE: 67 BPM | DIASTOLIC BLOOD PRESSURE: 74 MMHG | TEMPERATURE: 98 F

## 2021-11-29 DIAGNOSIS — M25.572 LEFT ANKLE PAIN, UNSPECIFIED CHRONICITY: Primary | ICD-10-CM

## 2021-11-29 DIAGNOSIS — M25.572 LEFT ANKLE PAIN, UNSPECIFIED CHRONICITY: ICD-10-CM

## 2021-11-29 DIAGNOSIS — S82.54XA CLOSED NONDISPLACED FRACTURE OF MEDIAL MALLEOLUS OF RIGHT TIBIA, INITIAL ENCOUNTER: Primary | ICD-10-CM

## 2021-11-29 PROCEDURE — 73610 X-RAY EXAM OF ANKLE: CPT | Mod: 26,LT,, | Performed by: RADIOLOGY

## 2021-11-29 PROCEDURE — 99213 PR OFFICE/OUTPT VISIT, EST, LEVL III, 20-29 MIN: ICD-10-PCS | Mod: S$GLB,,, | Performed by: ORTHOPAEDIC SURGERY

## 2021-11-29 PROCEDURE — 99999 PR PBB SHADOW E&M-EST. PATIENT-LVL V: CPT | Mod: PBBFAC,,, | Performed by: ORTHOPAEDIC SURGERY

## 2021-11-29 PROCEDURE — 73610 XR ANKLE COMPLETE 3 VIEW LEFT: ICD-10-PCS | Mod: 26,LT,, | Performed by: RADIOLOGY

## 2021-11-29 PROCEDURE — 99999 PR PBB SHADOW E&M-EST. PATIENT-LVL V: ICD-10-PCS | Mod: PBBFAC,,, | Performed by: ORTHOPAEDIC SURGERY

## 2021-11-29 PROCEDURE — 73610 X-RAY EXAM OF ANKLE: CPT | Mod: TC,FY,PN,LT

## 2021-11-29 PROCEDURE — 99213 OFFICE O/P EST LOW 20 MIN: CPT | Mod: S$GLB,,, | Performed by: ORTHOPAEDIC SURGERY

## 2021-11-29 RX ORDER — ACETAMINOPHEN AND CODEINE PHOSPHATE 300; 30 MG/1; MG/1
TABLET ORAL
COMMUNITY
Start: 2021-11-24 | End: 2021-11-29 | Stop reason: SDUPTHER

## 2021-11-29 RX ORDER — MELOXICAM 7.5 MG/1
7.5 TABLET ORAL DAILY
Qty: 30 TABLET | Refills: 0 | Status: SHIPPED | OUTPATIENT
Start: 2021-11-29 | End: 2022-01-03 | Stop reason: SDUPTHER

## 2021-11-29 RX ORDER — ACETAMINOPHEN AND CODEINE PHOSPHATE 300; 30 MG/1; MG/1
1 TABLET ORAL EVERY 6 HOURS PRN
Qty: 10 TABLET | Refills: 0 | Status: SHIPPED | OUTPATIENT
Start: 2021-11-29 | End: 2022-06-16

## 2021-12-10 DIAGNOSIS — M25.572 LEFT ANKLE PAIN, UNSPECIFIED CHRONICITY: Primary | ICD-10-CM

## 2021-12-13 ENCOUNTER — APPOINTMENT (OUTPATIENT)
Dept: RADIOLOGY | Facility: HOSPITAL | Age: 61
End: 2021-12-13
Attending: ORTHOPAEDIC SURGERY
Payer: COMMERCIAL

## 2021-12-13 ENCOUNTER — OFFICE VISIT (OUTPATIENT)
Dept: ORTHOPEDICS | Facility: CLINIC | Age: 61
End: 2021-12-13
Payer: COMMERCIAL

## 2021-12-13 VITALS
HEIGHT: 67 IN | HEART RATE: 60 BPM | DIASTOLIC BLOOD PRESSURE: 62 MMHG | SYSTOLIC BLOOD PRESSURE: 116 MMHG | BODY MASS INDEX: 31.24 KG/M2 | OXYGEN SATURATION: 95 % | TEMPERATURE: 98 F | WEIGHT: 199.06 LBS

## 2021-12-13 DIAGNOSIS — S82.54XA CLOSED NONDISPLACED FRACTURE OF MEDIAL MALLEOLUS OF RIGHT TIBIA, INITIAL ENCOUNTER: Primary | ICD-10-CM

## 2021-12-13 DIAGNOSIS — M25.572 LEFT ANKLE PAIN, UNSPECIFIED CHRONICITY: ICD-10-CM

## 2021-12-13 PROCEDURE — 73610 X-RAY EXAM OF ANKLE: CPT | Mod: TC,FY,PN,LT

## 2021-12-13 PROCEDURE — 73610 X-RAY EXAM OF ANKLE: CPT | Mod: 26,LT,, | Performed by: RADIOLOGY

## 2021-12-13 PROCEDURE — 99213 OFFICE O/P EST LOW 20 MIN: CPT | Mod: S$GLB,,, | Performed by: ORTHOPAEDIC SURGERY

## 2021-12-13 PROCEDURE — 99999 PR PBB SHADOW E&M-EST. PATIENT-LVL V: ICD-10-PCS | Mod: PBBFAC,,, | Performed by: ORTHOPAEDIC SURGERY

## 2021-12-13 PROCEDURE — 99999 PR PBB SHADOW E&M-EST. PATIENT-LVL V: CPT | Mod: PBBFAC,,, | Performed by: ORTHOPAEDIC SURGERY

## 2021-12-13 PROCEDURE — 99213 PR OFFICE/OUTPT VISIT, EST, LEVL III, 20-29 MIN: ICD-10-PCS | Mod: S$GLB,,, | Performed by: ORTHOPAEDIC SURGERY

## 2021-12-13 PROCEDURE — 73610 XR ANKLE COMPLETE 3 VIEW LEFT: ICD-10-PCS | Mod: 26,LT,, | Performed by: RADIOLOGY

## 2021-12-14 ENCOUNTER — IMMUNIZATION (OUTPATIENT)
Dept: OBSTETRICS AND GYNECOLOGY | Facility: CLINIC | Age: 61
End: 2021-12-14
Payer: COMMERCIAL

## 2021-12-14 DIAGNOSIS — Z23 NEED FOR VACCINATION: Primary | ICD-10-CM

## 2021-12-14 PROCEDURE — 0004A COVID-19, MRNA, LNP-S, PF, 30 MCG/0.3 ML DOSE VACCINE: CPT | Mod: PBBFAC | Performed by: FAMILY MEDICINE

## 2021-12-15 NOTE — OR NURSING
--------------  CONTINUED STAY REVIEW    Payor: [employer]  Subscriber #:  [id number]  Authorization Number: [id number]    Admit date: 12/12/21  Admit time: 10:37 PM    Admitting Physician: Thu Madrigal MD  Attending Physician:  Thu Madrigal MD  Prim Sedation Start: 0857  End: 0931   98 107 109   CO2 26.0 24.0 24.0   ALKPHO 61 56 52   AST 29 29 29   ALT 34 30 34   BILT 0.6 0.4 0.4   TP 7.4 6.3* 5.8*            Problem list reviewed:  Patient Active Problem List:     COVID-19     Hypoxia     Hyponatremia     Hypokalemia                Anel Estrada, RN      dexamethasone (DECADRON) tab 6 mg     Date Action Dose Route User    12/15/2021 0830 Given 6 mg Oral Brandon Wolff RN          Vitals (last day)     Date/Time Temp Pulse Resp BP SpO2 Weight O2 Device O2 Flow Rate (L/min) Who    12/15

## 2021-12-16 ENCOUNTER — TELEPHONE (OUTPATIENT)
Dept: FAMILY MEDICINE | Facility: CLINIC | Age: 61
End: 2021-12-16
Payer: COMMERCIAL

## 2021-12-28 ENCOUNTER — PATIENT OUTREACH (OUTPATIENT)
Dept: ADMINISTRATIVE | Facility: OTHER | Age: 61
End: 2021-12-28
Payer: COMMERCIAL

## 2021-12-30 DIAGNOSIS — M25.572 LEFT ANKLE PAIN, UNSPECIFIED CHRONICITY: Primary | ICD-10-CM

## 2022-01-03 ENCOUNTER — APPOINTMENT (OUTPATIENT)
Dept: RADIOLOGY | Facility: HOSPITAL | Age: 62
End: 2022-01-03
Attending: ORTHOPAEDIC SURGERY
Payer: COMMERCIAL

## 2022-01-03 ENCOUNTER — OFFICE VISIT (OUTPATIENT)
Dept: ORTHOPEDICS | Facility: CLINIC | Age: 62
End: 2022-01-03
Payer: COMMERCIAL

## 2022-01-03 VITALS
BODY MASS INDEX: 31.18 KG/M2 | HEART RATE: 69 BPM | HEIGHT: 67 IN | DIASTOLIC BLOOD PRESSURE: 76 MMHG | OXYGEN SATURATION: 95 % | TEMPERATURE: 99 F | SYSTOLIC BLOOD PRESSURE: 134 MMHG

## 2022-01-03 DIAGNOSIS — S82.54XA CLOSED NONDISPLACED FRACTURE OF MEDIAL MALLEOLUS OF RIGHT TIBIA, INITIAL ENCOUNTER: Primary | ICD-10-CM

## 2022-01-03 DIAGNOSIS — M25.572 LEFT ANKLE PAIN, UNSPECIFIED CHRONICITY: ICD-10-CM

## 2022-01-03 PROCEDURE — 73610 X-RAY EXAM OF ANKLE: CPT | Mod: TC,FY,PN,LT

## 2022-01-03 PROCEDURE — 99999 PR PBB SHADOW E&M-EST. PATIENT-LVL IV: ICD-10-PCS | Mod: PBBFAC,,, | Performed by: ORTHOPAEDIC SURGERY

## 2022-01-03 PROCEDURE — 1160F RVW MEDS BY RX/DR IN RCRD: CPT | Mod: CPTII,S$GLB,, | Performed by: ORTHOPAEDIC SURGERY

## 2022-01-03 PROCEDURE — 3008F BODY MASS INDEX DOCD: CPT | Mod: CPTII,S$GLB,, | Performed by: ORTHOPAEDIC SURGERY

## 2022-01-03 PROCEDURE — 73610 XR ANKLE COMPLETE 3 VIEW LEFT: ICD-10-PCS | Mod: 26,LT,, | Performed by: RADIOLOGY

## 2022-01-03 PROCEDURE — 1160F PR REVIEW ALL MEDS BY PRESCRIBER/CLIN PHARMACIST DOCUMENTED: ICD-10-PCS | Mod: CPTII,S$GLB,, | Performed by: ORTHOPAEDIC SURGERY

## 2022-01-03 PROCEDURE — 3078F PR MOST RECENT DIASTOLIC BLOOD PRESSURE < 80 MM HG: ICD-10-PCS | Mod: CPTII,S$GLB,, | Performed by: ORTHOPAEDIC SURGERY

## 2022-01-03 PROCEDURE — 1159F PR MEDICATION LIST DOCUMENTED IN MEDICAL RECORD: ICD-10-PCS | Mod: CPTII,S$GLB,, | Performed by: ORTHOPAEDIC SURGERY

## 2022-01-03 PROCEDURE — 3075F PR MOST RECENT SYSTOLIC BLOOD PRESS GE 130-139MM HG: ICD-10-PCS | Mod: CPTII,S$GLB,, | Performed by: ORTHOPAEDIC SURGERY

## 2022-01-03 PROCEDURE — 3008F PR BODY MASS INDEX (BMI) DOCUMENTED: ICD-10-PCS | Mod: CPTII,S$GLB,, | Performed by: ORTHOPAEDIC SURGERY

## 2022-01-03 PROCEDURE — 99213 OFFICE O/P EST LOW 20 MIN: CPT | Mod: S$GLB,,, | Performed by: ORTHOPAEDIC SURGERY

## 2022-01-03 PROCEDURE — 99213 PR OFFICE/OUTPT VISIT, EST, LEVL III, 20-29 MIN: ICD-10-PCS | Mod: S$GLB,,, | Performed by: ORTHOPAEDIC SURGERY

## 2022-01-03 PROCEDURE — 1159F MED LIST DOCD IN RCRD: CPT | Mod: CPTII,S$GLB,, | Performed by: ORTHOPAEDIC SURGERY

## 2022-01-03 PROCEDURE — 73610 X-RAY EXAM OF ANKLE: CPT | Mod: 26,LT,, | Performed by: RADIOLOGY

## 2022-01-03 PROCEDURE — 3075F SYST BP GE 130 - 139MM HG: CPT | Mod: CPTII,S$GLB,, | Performed by: ORTHOPAEDIC SURGERY

## 2022-01-03 PROCEDURE — 99999 PR PBB SHADOW E&M-EST. PATIENT-LVL IV: CPT | Mod: PBBFAC,,, | Performed by: ORTHOPAEDIC SURGERY

## 2022-01-03 PROCEDURE — 3078F DIAST BP <80 MM HG: CPT | Mod: CPTII,S$GLB,, | Performed by: ORTHOPAEDIC SURGERY

## 2022-01-03 RX ORDER — MELOXICAM 7.5 MG/1
7.5 TABLET ORAL DAILY
Qty: 30 TABLET | Refills: 0 | Status: SHIPPED | OUTPATIENT
Start: 2022-01-03 | End: 2022-02-09

## 2022-01-03 RX ORDER — IBUPROFEN 600 MG/1
600 TABLET ORAL EVERY 6 HOURS PRN
COMMUNITY
Start: 2021-12-16 | End: 2022-06-16

## 2022-01-03 NOTE — PROGRESS NOTES
Chief Complaint   Patient presents with    Left Foot - Injury      Initial visit ( 01/03/2022 ): Alyce Zurita is a 61 y.o. female who presents today complaining of Injury of the Left Foot     DOI: 11/27/21  Fell down a few stairs while out of town over Thanksgiving   Pain almost resolved   Has been compliant with WB restrictions     This is the extent of the patient's complaints at this time.     Review of Systems   Unremarkable, no changes since last visit.     No change in medical, surgical, family or surgical history except as stated above    Review of patient's allergies indicates:   Allergen Reactions    Other omega-3s Nausea And Vomiting     chlorine    Rofecoxib Nausea And Vomiting         Current Outpatient Medications:     acetaminophen (TYLENOL) 325 MG tablet, Take 2 tablets (650 mg total) by mouth every 6 (six) hours as needed., Disp: 13 tablet, Rfl: 0    acetaminophen-codeine 300-30mg (TYLENOL #3) 300-30 mg Tab, Take 1 tablet by mouth every 6 (six) hours as needed (pain)., Disp: 10 tablet, Rfl: 0    aloe vera 25 mg Cap, Take 1 capsule by mouth once daily., Disp: 30 capsule, Rfl: 11    aspirin (ECOTRIN) 81 MG EC tablet, Take 1 tablet (81 mg total) by mouth once daily., Disp: 90 tablet, Rfl: 3    atorvastatin (LIPITOR) 20 MG tablet, Take 1 tablet (20 mg total) by mouth once daily., Disp: 90 tablet, Rfl: 3    cholecalciferol, vitamin D3, (VITAMIN D3 ORAL), Take by mouth 2 (two) times daily., Disp: , Rfl:     dextromethorphan-guaifenesin  mg (MUCINEX DM)  mg per 12 hr tablet, Take 1 tablet by mouth every 12 (twelve) hours., Disp: , Rfl:     dicyclomine (BENTYL) 20 mg tablet, Take 1 tablet (20 mg total) by mouth 2 (two) times daily., Disp: 60 tablet, Rfl: 9    fluconazole (DIFLUCAN) 150 MG Tab, Take 1 tablet (150 mg total) by mouth once daily., Disp: 1 tablet, Rfl: 0    FLUoxetine 20 MG capsule, Take 1 capsule by mouth once daily in the morning, Disp: 90 capsule, Rfl: 0     fluticasone (FLONASE) 50 mcg/actuation nasal spray, 2 sprays by Each Nare route once daily., Disp: , Rfl:     gabapentin (NEURONTIN) 300 MG capsule, TAKE 2 CAPSULES BY MOUTH IN THE MORNING AND 2 NIGHTLY, Disp: 360 capsule, Rfl: 1    glucosamine/chondr gray A sod (OSTEO BI-FLEX ORAL), Take by mouth 2 (two) times daily., Disp: , Rfl:     hydroCHLOROthiazide (MICROZIDE) 12.5 mg capsule, Take 1 capsule (12.5 mg total) by mouth once daily., Disp: 90 capsule, Rfl: 1    hydrOXYzine HCL (ATARAX) 25 MG tablet, Take 1 tablet (25 mg total) by mouth nightly., Disp: 30 tablet, Rfl: 11    ibuprofen (ADVIL,MOTRIN) 600 MG tablet, Take 600 mg by mouth every 6 (six) hours as needed., Disp: , Rfl:     meclizine (ANTIVERT) 25 mg tablet, Take 1 tablet (25 mg total) by mouth 3 (three) times daily as needed., Disp: 90 tablet, Rfl: 0    meloxicam (MOBIC) 7.5 MG tablet, Take 1 tablet (7.5 mg total) by mouth once daily. Take once a day for two weeks then as needed. Take with food, Disp: 30 tablet, Rfl: 0    metoprolol tartrate (LOPRESSOR) 25 MG tablet, Take 1 tablet by mouth once daily, Disp: 90 tablet, Rfl: 0    montelukast (SINGULAIR) 10 mg tablet, TAKE 1 TABLET BY MOUTH ONCE DAILY IN THE EVENING, Disp: 90 tablet, Rfl: 0    nitroGLYCERIN (NITROSTAT) 0.4 MG SL tablet, Place 0.4 mg under the tongue., Disp: , Rfl:     omeprazole (PRILOSEC) 40 MG capsule, Take 1 capsule (40 mg total) by mouth 2 (two) times daily before meals., Disp: 180 capsule, Rfl: 3    tiZANidine (ZANAFLEX) 4 MG tablet, Take 1 tablet (4 mg total) by mouth every 8 (eight) hours., Disp: 90 tablet, Rfl: 11    traZODone (DESYREL) 150 MG tablet, Take 1 tablet (150 mg total) by mouth every evening., Disp: 90 tablet, Rfl: 1    traZODone (DESYREL) 150 MG tablet, TAKE 1 TABLET BY MOUTH ONCE DAILY IN THE EVENING, Disp: 90 tablet, Rfl: 0    ALPRAZolam (XANAX) 0.5 MG tablet, Take 1 tablet (0.5 mg total) by mouth nightly as needed for Insomnia or Anxiety., Disp: 7 tablet,  "Rfl: 0    Physical Exam:   Vitals:    01/03/22 1310   BP: 134/76   Pulse: 69   Temp: 98.6 °F (37 °C)   TempSrc: Oral   SpO2: 95%   Height: 5' 7" (1.702 m)       General:   Body mass index is 31.18 kg/m².  Patient is alert, awake and oriented to time, place and person. Mood and affect are appropriate.  Patient does not appear to be in any distress, denies any constitutional symptoms and appears stated age.   HEENT:  Pupils are equal and round, sclera are not injected. External examination of ears and nose reveals no abnormalities. Cranial nerves II-X are grossly intact  Skin:  no rashes, abrasions or open wounds on the affected extremity   Resp:  No respiratory distress or audible wheezing   CV: 2+  pulses, all extremities warm and well perfused   Left Lower Extremity    Ecchymosis to medial and lateral ankle resolved  NTTP over medial mal, ATFL, lateral mal   Ltsi s/s/sp/dp/t  + ehl/fhl/ta/gs  2+ DP         Imaging: 3 views of the L ankle + gravity stress show a vertical medial mal fracture only visible on mortise view. Does not appear to completely exit the medial cortex of the tibia.  No instability of ankle w stress  2 views L tib fib negative for proximal fibula fx     I personally reviewed and interpreted the patient's imaging obtained today in clinic     Assessment: 61 y.o. female with L non displaced medial mal fracture    Plan:   - WBAT LLE in CAM boot   - Refilled meloxicam  - RTC 4 weeks with new XR      All questions were answered in detail. The patient is in full agreement with the treatment plan and will proceed accordingly.    A note notifying No ref. provider found of my findings was sent via the electronic medical record     This note was created by combination of typed  and M-Modal dictation. Transcription and phonetic errors may be present.  If there are any questions, please contact me.    Past Medical History:   Diagnosis Date    Allergy     Anticoagulant long-term use     Anxiety " 11/16/2018    Arthritis     Breast injury     left 10 yrs ago/ hit in chest    Carpal tunnel syndrome of left wrist 9/10/2018    GERD (gastroesophageal reflux disease)     Hyperlipemia     Hypertension, uncontrolled 8/17/2018    Interstitial cystitis     Kidney problem     Meningitis     3 months old    Osteoporosis 3/20/2018    Polyp of rectum     PONV (postoperative nausea and vomiting)     pt needs meds for PONV    Tachycardia        Active Problem List with Overview Notes    Diagnosis Date Noted    Need for influenza vaccination 10/22/2021    Annual physical exam 10/22/2021    Change in mole 10/22/2021    Lumbar spondylosis 08/04/2021    Primary osteoarthritis of both knees 01/08/2021    Foot pain, right 12/28/2020    Lumbar herniated disc 07/31/2020    DDD (degenerative disc disease), lumbar 07/31/2020    Sacroiliac joint pain 07/31/2020    Facet arthropathy, lumbar 07/31/2020    Radiculopathy of leg 07/29/2020    Dorsalgia, unspecified 07/29/2020    Right hand pain 03/06/2020    Chronic pain of both knees 11/22/2019    Cervical spondylosis 10/04/2019    DDD (degenerative disc disease), cervical 10/04/2019    Myofascial pain 10/04/2019    Chronic sinusitis 02/27/2019    Coronary artery disease involving native coronary artery of native heart without angina pectoris 02/25/2019    Abnormal nuclear stress test 02/25/2019    Anxiety 11/16/2018    Carpal tunnel syndrome of left wrist 09/10/2018    Wrist pain, chronic, left 09/10/2018    Chronic neck pain 08/17/2018    Primary insomnia 08/17/2018    Obesity (BMI 30.0-34.9) 08/17/2018    Osteoporosis 03/20/2018    Heart palpitations 09/14/2017    Atrophic vaginitis 01/29/2016    Overactive bladder 12/04/2015    Unstable angina 09/08/2014    Reflux 07/17/2012    Polyp of colon 07/17/2012    Internal hemorrhoids 07/17/2012     dx update      GERD (gastroesophageal reflux disease) 07/17/2012    Diverticular disease of  colon 07/17/2012     dx update         Past Surgical History:   Procedure Laterality Date    APPENDECTOMY      CARDIAC SURGERY      ABLATION    EPIDURAL STEROID INJECTION Left 1/8/2020    Procedure: Cervical Medial Branch Blocks;  Surgeon: Chaitanya Luna Jr., MD;  Location: [facility] ENDO;  Service: Pain Management;  Laterality: Left;  Left C4, C5, C6 MBB    Arrive @ 0945; ASA and Plavix last 12/31; No DM; NO Sedation    EPIDURAL STEROID INJECTION Left 1/22/2020    Procedure: Cervical Medial Branch Blocks;  Surgeon: Chaitanya Luna Jr., MD;  Location: [facility] ENDO;  Service: Pain Management;  Laterality: Left;  Left C4-6 MBB    Arrive @ 1015; NO Sedation; ASA/Plavix last 1/14; No DM    EPIDURAL STEROID INJECTION Left 2/12/2020    Procedure: Cervical Radiofrequency Thermocoagulation of Medial Branches;  Surgeon: Chaitanya Luna Jr., MD;  Location: [facility] ENDO;  Service: Pain Management;  Laterality: Left;  Left C4-6 RFA    Arrive @ 0730; IV Sedation; ASA/Plavix last doses 2/4/20; No DM    EPIDURAL STEROID INJECTION Bilateral 8/5/2020    Procedure: Bilateral TESI @ L4-5;  Surgeon: Chaitanya Luna Jr., MD;  Location: [facility] ENDO;  Service: Pain Management;  Laterality: Bilateral;  Bilat L5 TF DEQUAN  Arrive @ 1330; No DM; ASA held greater than a wk    Heart Procedure       HYSTERECTOMY  1993    KIDNEY SURGERY      LEFT HEART CATHETERIZATION Left 2/25/2019    Procedure: Left heart cath 12pm start, R rad access;  Surgeon: Chaitanya Angela MD;  Location: [facility] CATH LAB;  Service: Cardiology;  Laterality: Left;  RN PREOP 2/20/19  ARRIVAL TIME 10AM    OOPHORECTOMY Bilateral 1993    TEMPOROMANDIBULAR JOINT SURGERY         Family History   Problem Relation Age of Onset    Arthritis Mother     Glaucoma Mother     Breast cancer Mother     Asthma Daughter     Breast cancer Maternal Aunt     Breast cancer Maternal Grandmother     Breast cancer Paternal Grandmother     Breast cancer Maternal Aunt      Melanoma Father

## 2022-01-24 PROBLEM — Z00.00 ANNUAL PHYSICAL EXAM: Status: RESOLVED | Noted: 2021-10-22 | Resolved: 2022-01-24

## 2022-01-28 DIAGNOSIS — S82.54XA CLOSED NONDISPLACED FRACTURE OF MEDIAL MALLEOLUS OF RIGHT TIBIA, INITIAL ENCOUNTER: Primary | ICD-10-CM

## 2022-01-31 ENCOUNTER — APPOINTMENT (OUTPATIENT)
Dept: RADIOLOGY | Facility: HOSPITAL | Age: 62
End: 2022-01-31
Attending: ORTHOPAEDIC SURGERY
Payer: COMMERCIAL

## 2022-01-31 ENCOUNTER — OFFICE VISIT (OUTPATIENT)
Dept: ORTHOPEDICS | Facility: CLINIC | Age: 62
End: 2022-01-31
Payer: COMMERCIAL

## 2022-01-31 VITALS
WEIGHT: 199.75 LBS | BODY MASS INDEX: 31.35 KG/M2 | DIASTOLIC BLOOD PRESSURE: 70 MMHG | RESPIRATION RATE: 18 BRPM | OXYGEN SATURATION: 95 % | HEIGHT: 67 IN | HEART RATE: 60 BPM | SYSTOLIC BLOOD PRESSURE: 128 MMHG

## 2022-01-31 DIAGNOSIS — S82.54XA CLOSED NONDISPLACED FRACTURE OF MEDIAL MALLEOLUS OF RIGHT TIBIA, INITIAL ENCOUNTER: ICD-10-CM

## 2022-01-31 DIAGNOSIS — S93.492A SPRAIN OF ANTERIOR TALOFIBULAR LIGAMENT OF LEFT ANKLE, INITIAL ENCOUNTER: ICD-10-CM

## 2022-01-31 DIAGNOSIS — M65.321 TRIGGER INDEX FINGER OF RIGHT HAND: Primary | ICD-10-CM

## 2022-01-31 PROCEDURE — 3008F PR BODY MASS INDEX (BMI) DOCUMENTED: ICD-10-PCS | Mod: CPTII,S$GLB,, | Performed by: ORTHOPAEDIC SURGERY

## 2022-01-31 PROCEDURE — 1159F PR MEDICATION LIST DOCUMENTED IN MEDICAL RECORD: ICD-10-PCS | Mod: CPTII,S$GLB,, | Performed by: ORTHOPAEDIC SURGERY

## 2022-01-31 PROCEDURE — 73610 X-RAY EXAM OF ANKLE: CPT | Mod: 26,LT,, | Performed by: RADIOLOGY

## 2022-01-31 PROCEDURE — 3074F PR MOST RECENT SYSTOLIC BLOOD PRESSURE < 130 MM HG: ICD-10-PCS | Mod: CPTII,S$GLB,, | Performed by: ORTHOPAEDIC SURGERY

## 2022-01-31 PROCEDURE — 1159F MED LIST DOCD IN RCRD: CPT | Mod: CPTII,S$GLB,, | Performed by: ORTHOPAEDIC SURGERY

## 2022-01-31 PROCEDURE — 3074F SYST BP LT 130 MM HG: CPT | Mod: CPTII,S$GLB,, | Performed by: ORTHOPAEDIC SURGERY

## 2022-01-31 PROCEDURE — 73610 X-RAY EXAM OF ANKLE: CPT | Mod: TC,FY,PN,LT

## 2022-01-31 PROCEDURE — 99999 PR PBB SHADOW E&M-EST. PATIENT-LVL V: CPT | Mod: PBBFAC,,, | Performed by: ORTHOPAEDIC SURGERY

## 2022-01-31 PROCEDURE — 3008F BODY MASS INDEX DOCD: CPT | Mod: CPTII,S$GLB,, | Performed by: ORTHOPAEDIC SURGERY

## 2022-01-31 PROCEDURE — 99213 PR OFFICE/OUTPT VISIT, EST, LEVL III, 20-29 MIN: ICD-10-PCS | Mod: 25,S$GLB,, | Performed by: ORTHOPAEDIC SURGERY

## 2022-01-31 PROCEDURE — 3078F DIAST BP <80 MM HG: CPT | Mod: CPTII,S$GLB,, | Performed by: ORTHOPAEDIC SURGERY

## 2022-01-31 PROCEDURE — 20550 TENDON SHEATH: ICD-10-PCS | Mod: F6,S$GLB,, | Performed by: ORTHOPAEDIC SURGERY

## 2022-01-31 PROCEDURE — 99999 PR PBB SHADOW E&M-EST. PATIENT-LVL V: ICD-10-PCS | Mod: PBBFAC,,, | Performed by: ORTHOPAEDIC SURGERY

## 2022-01-31 PROCEDURE — 3078F PR MOST RECENT DIASTOLIC BLOOD PRESSURE < 80 MM HG: ICD-10-PCS | Mod: CPTII,S$GLB,, | Performed by: ORTHOPAEDIC SURGERY

## 2022-01-31 PROCEDURE — 99213 OFFICE O/P EST LOW 20 MIN: CPT | Mod: 25,S$GLB,, | Performed by: ORTHOPAEDIC SURGERY

## 2022-01-31 PROCEDURE — 20550 NJX 1 TENDON SHEATH/LIGAMENT: CPT | Mod: F6,S$GLB,, | Performed by: ORTHOPAEDIC SURGERY

## 2022-01-31 PROCEDURE — 73610 XR ANKLE COMPLETE 3 VIEW LEFT: ICD-10-PCS | Mod: 26,LT,, | Performed by: RADIOLOGY

## 2022-01-31 RX ORDER — TRIAMCINOLONE ACETONIDE 40 MG/ML
40 INJECTION, SUSPENSION INTRA-ARTICULAR; INTRAMUSCULAR
Status: DISCONTINUED | OUTPATIENT
Start: 2022-01-31 | End: 2022-01-31 | Stop reason: HOSPADM

## 2022-01-31 RX ADMIN — TRIAMCINOLONE ACETONIDE 40 MG: 40 INJECTION, SUSPENSION INTRA-ARTICULAR; INTRAMUSCULAR at 01:01

## 2022-01-31 NOTE — PROCEDURES
Tendon Sheath    Date/Time: 1/31/2022 1:30 PM  Performed by: Joyce Ross MD  Authorized by: Joyce Ross MD     Consent Done?:  Yes (Verbal)  Indications:  Pain  Timeout: prior to procedure the correct patient, procedure, and site was verified    Prep: patient was prepped and draped in usual sterile fashion      Local anesthesia used?: Yes    Local anesthetic:  Topical anesthetic  Location:  Index finger  Site:  R index flexor tendon sheath  Needle size:  25 G  Approach:  Volar  Medications:  40 mg triamcinolone acetonide 40 mg/mL  Patient tolerance:  Patient tolerated the procedure well with no immediate complications

## 2022-01-31 NOTE — LETTER
January 31, 2022    Alyce RODARTE Yudith  [address]  [place] [address]         [facility]  [address]  [place] [address]  Phone: [phone] January 31, 2022     Patient: Alyce Zurita   YOB: 1960   Date of Visit: 1/31/2022       To Whom It May Concern:    It is my medical opinion that Alyce Zurita may return to light duty immediately with the following restrictions: weight bearing as tolerated in normal shoes, limit work to desk work. Will re-evaulate in 6 weeks.    If you have any questions or concerns, please don't hesitate to call.    Sincerely,        Joyce Ross MD

## 2022-01-31 NOTE — PROGRESS NOTES
Chief Complaint   Patient presents with    Left Ankle - Injury, Pain, Swelling      Initial visit (11/29/21): Alyce Zurita is a 61 y.o. female who presents today complaining of Injury, Pain, and Swelling of the Left Ankle     DOI: 11/27/21  Fell down a few stairs while out of town over Thanksgiving     Interval history 12/13/21  Pain almost resolved   Has been compliant with WB restrictions     Interval history 1/31/22  Has mild medial pain  Now 8 weeks out   Does not have any pain walking in the boot  Has more pain lateral from ATFL   Also complaining of pain in RIF while typing     This is the extent of the patient's complaints at this time.     Review of Systems   Unremarkable, no changes since last visit.     No change in medical, surgical, family or surgical history except as stated above    Review of patient's allergies indicates:   Allergen Reactions    Other omega-3s Nausea And Vomiting     chlorine    Rofecoxib Nausea And Vomiting         Current Outpatient Medications:     acetaminophen (TYLENOL) 325 MG tablet, Take 2 tablets (650 mg total) by mouth every 6 (six) hours as needed., Disp: 13 tablet, Rfl: 0    acetaminophen-codeine 300-30mg (TYLENOL #3) 300-30 mg Tab, Take 1 tablet by mouth every 6 (six) hours as needed (pain)., Disp: 10 tablet, Rfl: 0    aloe vera 25 mg Cap, Take 1 capsule by mouth once daily., Disp: 30 capsule, Rfl: 11    aspirin (ECOTRIN) 81 MG EC tablet, Take 1 tablet (81 mg total) by mouth once daily., Disp: 90 tablet, Rfl: 3    atorvastatin (LIPITOR) 20 MG tablet, Take 1 tablet (20 mg total) by mouth once daily., Disp: 90 tablet, Rfl: 3    cholecalciferol, vitamin D3, (VITAMIN D3 ORAL), Take by mouth 2 (two) times daily., Disp: , Rfl:     dextromethorphan-guaifenesin  mg (MUCINEX DM)  mg per 12 hr tablet, Take 1 tablet by mouth every 12 (twelve) hours., Disp: , Rfl:     dicyclomine (BENTYL) 20 mg tablet, Take 1 tablet (20 mg total) by mouth 2 (two) times daily.,  Disp: 60 tablet, Rfl: 9    fluconazole (DIFLUCAN) 150 MG Tab, Take 1 tablet (150 mg total) by mouth once daily., Disp: 1 tablet, Rfl: 0    FLUoxetine 20 MG capsule, Take 1 capsule by mouth once daily in the morning, Disp: 90 capsule, Rfl: 0    fluticasone (FLONASE) 50 mcg/actuation nasal spray, 2 sprays by Each Nare route once daily., Disp: , Rfl:     gabapentin (NEURONTIN) 300 MG capsule, TAKE 2 CAPSULES BY MOUTH IN THE MORNING AND 2 NIGHTLY, Disp: 360 capsule, Rfl: 1    glucosamine/chondr gray A sod (OSTEO BI-FLEX ORAL), Take by mouth 2 (two) times daily., Disp: , Rfl:     hydroCHLOROthiazide (MICROZIDE) 12.5 mg capsule, Take 1 capsule (12.5 mg total) by mouth once daily., Disp: 90 capsule, Rfl: 1    hydrOXYzine HCL (ATARAX) 25 MG tablet, Take 1 tablet (25 mg total) by mouth nightly., Disp: 30 tablet, Rfl: 11    ibuprofen (ADVIL,MOTRIN) 600 MG tablet, Take 600 mg by mouth every 6 (six) hours as needed., Disp: , Rfl:     meclizine (ANTIVERT) 25 mg tablet, Take 1 tablet (25 mg total) by mouth 3 (three) times daily as needed., Disp: 90 tablet, Rfl: 0    meloxicam (MOBIC) 7.5 MG tablet, Take 1 tablet (7.5 mg total) by mouth once daily. Take once a day for two weeks then as needed. Take with food, Disp: 30 tablet, Rfl: 0    metoprolol tartrate (LOPRESSOR) 25 MG tablet, Take 1 tablet by mouth once daily, Disp: 90 tablet, Rfl: 0    montelukast (SINGULAIR) 10 mg tablet, TAKE 1 TABLET BY MOUTH ONCE DAILY IN THE EVENING, Disp: 90 tablet, Rfl: 0    nitroGLYCERIN (NITROSTAT) 0.4 MG SL tablet, Place 0.4 mg under the tongue., Disp: , Rfl:     omeprazole (PRILOSEC) 40 MG capsule, Take 1 capsule (40 mg total) by mouth 2 (two) times daily before meals., Disp: 180 capsule, Rfl: 3    tiZANidine (ZANAFLEX) 4 MG tablet, Take 1 tablet (4 mg total) by mouth every 8 (eight) hours., Disp: 90 tablet, Rfl: 11    traZODone (DESYREL) 150 MG tablet, Take 1 tablet (150 mg total) by mouth every evening., Disp: 90 tablet, Rfl: 1     "traZODone (DESYREL) 150 MG tablet, TAKE 1 TABLET BY MOUTH ONCE DAILY IN THE EVENING, Disp: 90 tablet, Rfl: 0    ALPRAZolam (XANAX) 0.5 MG tablet, Take 1 tablet (0.5 mg total) by mouth nightly as needed for Insomnia or Anxiety., Disp: 7 tablet, Rfl: 0    Physical Exam:   Vitals:    01/31/22 1320   BP: 128/70   Pulse: 60   Resp: 18   SpO2: 95%   Weight: 90.6 kg (199 lb 11.8 oz)   Height: 5' 7" (1.702 m)   PainSc:   4   PainLoc: Ankle       General: Weight: 90.6 kg (199 lb 11.8 oz) Body mass index is 31.28 kg/m².  Patient is alert, awake and oriented to time, place and person. Mood and affect are appropriate.  Patient does not appear to be in any distress, denies any constitutional symptoms and appears stated age.   HEENT:  Pupils are equal and round, sclera are not injected. External examination of ears and nose reveals no abnormalities. Cranial nerves II-X are grossly intact  Skin:  no rashes, abrasions or open wounds on the affected extremity   Resp:  No respiratory distress or audible wheezing   CV: 2+  pulses, all extremities warm and well perfused     R hand   Palpable painful nodule at A1 pulley of trigger finger    Left Lower Extremity    Ecchymosis to medial and lateral ankle resolved   NTTP over medial mal, lateral mal   Mildly tender over ATFL with swelling  Ltsi s/s/sp/dp/t  + ehl/fhl/ta/gs  2+ DP     Imaging: 3 views of the L ankle: healing medial mal fracture, no displacement     I personally reviewed and interpreted the patient's imaging obtained today in clinic     Assessment: 61 y.o. female with L non displaced medial mal fracture, ATFL sprain     Plan:   - Injection of the right trigger finger - index performed, please see procedure note for more details.  Prior to the injection risks and benefits of corticosteroid injection were discussed with the patient including pain, infection, bleeding, skin color changes, swelling, steroid flare. We discussed that over time injections can result in chondral " damage, acceleration of arthritis formation, damage to tendons and damage to joints.  The patient consented for the procedure.  Post-injection instructions were given to the patient in writing  - WBAT LLE, will keep her light duty at work until next visit   - PT referral for ankle sprain placed   - Refilled meloxicam  - RTC 6 weeks with new XR, will plan on releasing to full duty at work at that time    All questions were answered in detail. The patient is in full agreement with the treatment plan and will proceed accordingly.    This note was created by combination of typed  and M-Modal dictation. Transcription and phonetic errors may be present.  If there are any questions, please contact me.    Past Medical History:   Diagnosis Date    Allergy     Anticoagulant long-term use     Anxiety 11/16/2018    Arthritis     Breast injury     left 10 yrs ago/ hit in chest    Carpal tunnel syndrome of left wrist 9/10/2018    GERD (gastroesophageal reflux disease)     Hyperlipemia     Hypertension, uncontrolled 8/17/2018    Interstitial cystitis     Kidney problem     Meningitis     3 months old    Osteoporosis 3/20/2018    Polyp of rectum     PONV (postoperative nausea and vomiting)     pt needs meds for PONV    Tachycardia        Active Problem List with Overview Notes    Diagnosis Date Noted    Need for influenza vaccination 10/22/2021    Change in mole 10/22/2021    Lumbar spondylosis 08/04/2021    Primary osteoarthritis of both knees 01/08/2021    Foot pain, right 12/28/2020    Lumbar herniated disc 07/31/2020    DDD (degenerative disc disease), lumbar 07/31/2020    Sacroiliac joint pain 07/31/2020    Facet arthropathy, lumbar 07/31/2020    Radiculopathy of leg 07/29/2020    Dorsalgia, unspecified 07/29/2020    Right hand pain 03/06/2020    Chronic pain of both knees 11/22/2019    Cervical spondylosis 10/04/2019    DDD (degenerative disc disease), cervical 10/04/2019     Myofascial pain 10/04/2019    Chronic sinusitis 02/27/2019    Coronary artery disease involving native coronary artery of native heart without angina pectoris 02/25/2019    Abnormal nuclear stress test 02/25/2019    Anxiety 11/16/2018    Carpal tunnel syndrome of left wrist 09/10/2018    Wrist pain, chronic, left 09/10/2018    Chronic neck pain 08/17/2018    Primary insomnia 08/17/2018    Obesity (BMI 30.0-34.9) 08/17/2018    Osteoporosis 03/20/2018    Heart palpitations 09/14/2017    Atrophic vaginitis 01/29/2016    Overactive bladder 12/04/2015    Unstable angina 09/08/2014    Reflux 07/17/2012    Polyp of colon 07/17/2012    Internal hemorrhoids 07/17/2012     dx update      GERD (gastroesophageal reflux disease) 07/17/2012    Diverticular disease of colon 07/17/2012     dx update         Past Surgical History:   Procedure Laterality Date    APPENDECTOMY      CARDIAC SURGERY      ABLATION    EPIDURAL STEROID INJECTION Left 1/8/2020    Procedure: Cervical Medial Branch Blocks;  Surgeon: Chaiatnya Luna Jr., MD;  Location: [facility] ENDO;  Service: Pain Management;  Laterality: Left;  Left C4, C5, C6 MBB    Arrive @ 0945; ASA and Plavix last 12/31; No DM; NO Sedation    EPIDURAL STEROID INJECTION Left 1/22/2020    Procedure: Cervical Medial Branch Blocks;  Surgeon: Chaitanya Luna Jr., MD;  Location: [facility] ENDO;  Service: Pain Management;  Laterality: Left;  Left C4-6 MBB    Arrive @ 1015; NO Sedation; ASA/Plavix last 1/14; No DM    EPIDURAL STEROID INJECTION Left 2/12/2020    Procedure: Cervical Radiofrequency Thermocoagulation of Medial Branches;  Surgeon: Chaitanya Luna Jr., MD;  Location: [facility] ENDO;  Service: Pain Management;  Laterality: Left;  Left C4-6 RFA    Arrive @ 0730; IV Sedation; ASA/Plavix last doses 2/4/20; No DM    EPIDURAL STEROID INJECTION Bilateral 8/5/2020    Procedure: Bilateral TESI @ L4-5;  Surgeon: Chaitanya Luna Jr., MD;  Location: [facility] ENDO;  Service:  Pain Management;  Laterality: Bilateral;  Bilat L5 TF DEQUAN  Arrive @ 1330; No DM; ASA held greater than a wk    Heart Procedure       HYSTERECTOMY  1993    KIDNEY SURGERY      LEFT HEART CATHETERIZATION Left 2/25/2019    Procedure: Left heart cath 12pm start, R rad access;  Surgeon: Chaitanya Angela MD;  Location: [facility] CATH LAB;  Service: Cardiology;  Laterality: Left;  RN PREOP 2/20/19  ARRIVAL TIME 10AM    OOPHORECTOMY Bilateral 1993    TEMPOROMANDIBULAR JOINT SURGERY         Family History   Problem Relation Age of Onset    Arthritis Mother     Glaucoma Mother     Breast cancer Mother     Asthma Daughter     Breast cancer Maternal Aunt     Breast cancer Maternal Grandmother     Breast cancer Paternal Grandmother     Breast cancer Maternal Aunt     Melanoma Father

## 2022-02-09 RX ORDER — MELOXICAM 7.5 MG/1
TABLET ORAL
Qty: 30 TABLET | Refills: 0 | Status: SHIPPED | OUTPATIENT
Start: 2022-02-09 | End: 2022-03-11 | Stop reason: SDUPTHER

## 2022-02-09 NOTE — TELEPHONE ENCOUNTER
----- Message from Ashlie Wong sent at 2/9/2022  9:16 AM CST -----  Regarding: speak with nurse  Contact: godwin  [phone]   please call [facility] pharmacy need clarification on maloxicam medication waiting on a call back thanks.

## 2022-02-15 DIAGNOSIS — I95.9 HYPOTENSION, UNSPECIFIED HYPOTENSION TYPE: ICD-10-CM

## 2022-02-16 NOTE — TELEPHONE ENCOUNTER
Refill Routing Note   Medication(s) are not appropriate for processing by [facility] for the following reason(s):      - Drug-Disease Interaction ( metoprolol tartrate and Hypotension)    [facility] action(s):  Defer Medication-related problems identified:   Requires appointment  Drug-disease interaction     Medication Therapy Plan: Drug-Disease: metoprolol tartrate and Hypotension, unspecified hypotension type; Defer to PCP  --->Care Gap information included in message below if applicable.   Medication reconciliation completed: No   Automatic Epic Generated Protocol Data:        Requested Prescriptions   Pending Prescriptions Disp Refills    metoprolol tartrate (LOPRESSOR) 25 MG tablet [Pharmacy Med Name: Metoprolol Tartrate 25 MG Oral Tablet] 90 tablet 0     Sig: Take 1 tablet by mouth once daily       Cardiovascular:  Beta Blockers Passed - 2/15/2022  7:00 PM        Passed - Patient is at least 18 years old        Passed - Last BP in normal range within 360 days     BP Readings from Last 1 Encounters:   01/31/22 128/70               Passed - Last Heart Rate in normal range within 360 days     Pulse Readings from Last 1 Encounters:   01/31/22 60              Passed - Valid encounter within last 15 months     Recent Visits  Date Type Provider Dept   05/21/21 Office Visit Toshia Cintron MD [facility]   03/16/21 Office Visit Toshia Cintron MD [facility]   12/28/20 Office Visit Toshia Cintron MD [facility]   12/02/20 Office Visit Toshia Cintron MD [facility]   08/06/20 Office Visit Toshia Cintron MD [facility]   07/27/20 Office Visit Toshia Cintron MD [facility]   05/29/20 Office Visit Toshia Cintron MD [facility]   04/28/20 Office Visit Toshia Cintron MD [facility]   02/28/20 Office Visit Toshia Cintron MD [facility]   Showing recent visits within past 720 days and meeting all other  requirements  Future Appointments  No visits were found meeting these conditions.  Showing future appointments within next 150 days and meeting all other requirements      Future Appointments              In 3 weeks [facility] - Re[facility], [facility]    In 4 weeks ABIOLA Mcmillan - Orthopedics, [facility]                      Appointments  past 12m or future 3m with PCP    Date Provider   Last Visit   5/21/2021 Toshia Cintron MD   Next Visit   Visit date not found Toshia Cintron MD   ED visits in past 90 days: 0        Note composed:9:16 PM 02/15/2022

## 2022-02-16 NOTE — TELEPHONE ENCOUNTER
Care Due:                  Date            Visit Type   Department     Provider  --------------------------------------------------------------------------------                                EP -                              PRIMARY      [facility]  Last Visit: 05-      CARE ([facility])   MEDICINE       Toshia Cintron  Next Visit: None Scheduled  None         None Found                                                            Last  Test          Frequency    Reason                     Performed    Due Date  --------------------------------------------------------------------------------    Office Visit  12 months..  hydroCHLOROthiazide,       05- 05-                             montelukast, omeprazole,                             traZODone................    Powered by [employer]. Reference number: [id number].   2/15/2022 7:01:19 PM CST

## 2022-02-18 RX ORDER — METOPROLOL TARTRATE 25 MG/1
TABLET, FILM COATED ORAL
Qty: 90 TABLET | Refills: 0 | Status: SHIPPED | OUTPATIENT
Start: 2022-02-18 | End: 2022-06-16

## 2022-03-08 ENCOUNTER — CLINICAL SUPPORT (OUTPATIENT)
Dept: REHABILITATION | Facility: HOSPITAL | Age: 62
End: 2022-03-08
Attending: ORTHOPAEDIC SURGERY
Payer: COMMERCIAL

## 2022-03-08 DIAGNOSIS — R29.898 ANKLE WEAKNESS: ICD-10-CM

## 2022-03-08 DIAGNOSIS — S82.54XA CLOSED NONDISPLACED FRACTURE OF MEDIAL MALLEOLUS OF RIGHT TIBIA, INITIAL ENCOUNTER: ICD-10-CM

## 2022-03-08 DIAGNOSIS — S93.492A SPRAIN OF ANTERIOR TALOFIBULAR LIGAMENT OF LEFT ANKLE, INITIAL ENCOUNTER: ICD-10-CM

## 2022-03-08 DIAGNOSIS — R26.9 GAIT DIFFICULTY: ICD-10-CM

## 2022-03-08 DIAGNOSIS — M25.672 DECREASED RANGE OF MOTION OF LEFT ANKLE: ICD-10-CM

## 2022-03-08 PROBLEM — S93.499A SPRAIN OF ANTERIOR TALOFIBULAR LIGAMENT: Status: ACTIVE | Noted: 2022-03-08

## 2022-03-08 PROCEDURE — 97110 THERAPEUTIC EXERCISES: CPT | Mod: PN,97

## 2022-03-08 PROCEDURE — 97161 PT EVAL LOW COMPLEX 20 MIN: CPT | Mod: PN,97

## 2022-03-08 NOTE — PLAN OF CARE
[facility]  Physical Therapy Initial Evaluation  Date: 3/8/2022   Name: Alyce Zurita  Clinic Number: [id number]    Therapy Diagnosis:   Encounter Diagnoses   Name Primary?    Closed nondisplaced fracture of medial malleolus of right tibia, initial encounter     Sprain of anterior talofibular ligament of left ankle, initial encounter     Decreased range of motion of left ankle     Ankle weakness     Gait difficulty      Physician: Joyce Ross MD    Physician Orders: PT Eval and Treat   Medical Diagnosis from Referral:   S82.54XA (ICD-10-CM) - Closed nondisplaced fracture of medial malleolus of right tibia, initial encounter   S93.492A (ICD-10-CM) - Sprain of anterior talofibular ligament of left ankle, initial encounter       Evaluation Date: 3/8/2022  Authorization Period Expiration: 01/31/2023  Plan of Care Expiration: 6-8-22  Visit # / Visits authorized: 1/ 1   Progress Note Due:4-8-22  FOTO: 1/ 1    Precautions: Standard    Time In: 7:00 am  Time Out: 7:45 am  Total Appointment Time (timed & untimed codes): 45 minutes    Subjective   Date of onset: before thanksgiving   History of current condition - Alyce reports: that she had fall and she broke the left ankle. Pt states she has more pain in the L foot compare to R foot.  Pain is localized lateral L ankle    LAMBERTO: fall down stairs   Any falls: yes  Any pain Plantar fascia pain: no  Any pain Deltoid ligament: No  Any pain ATFL, calcaneou fibular or posterior talofibular L: yes  Any ankle bruise:no  Pain radiates: localized   Pain constant or intermittent: intermittent   Any injection: No       Current 5/10, worst 7/10, best 2/10   Location: left lateral ankle    Description: Burning and Sharp  Aggravating Factors: Sitting, Standing, Walking, Extension, Flexing and Lifting  Easing Factors: massage    Prior Therapy: no  Social History:  lives with their family  Occupation: [employer]   Prior Level of Function:  Independent   Current Level of Function:Independent     Pts goals: decrease L ankle pain to return to work pain free.       Imaging, bone scan films:   FINDINGS:  Healing nondisplaced fracture medial malleolus, clearing of adjacent edema from 11/29/2021. partial opacification, spurring at Achilles tendon insertion site posterior calcaneus.     Impression:     Healing fracture medial malleolus.    Medical History:   Past Medical History:   Diagnosis Date    Allergy     Anticoagulant long-term use     Anxiety 11/16/2018    Arthritis     Breast injury     left 10 yrs ago/ hit in chest    Carpal tunnel syndrome of left wrist 9/10/2018    GERD (gastroesophageal reflux disease)     Hyperlipemia     Hypertension, uncontrolled 8/17/2018    Interstitial cystitis     Kidney problem     Meningitis     3 months old    Osteoporosis 3/20/2018    Polyp of rectum     PONV (postoperative nausea and vomiting)     pt needs meds for PONV    Tachycardia        Surgical History:   Alyce Zurita  has a past surgical history that includes Appendectomy; Heart Procedure ; Kidney surgery; Hysterectomy (1993); Oophorectomy (Bilateral, 1993); Temporomandibular joint surgery; Cardiac surgery; Left heart catheterization (Left, 2/25/2019); Epidural steroid injection (Left, 1/8/2020); Epidural steroid injection (Left, 1/22/2020); Epidural steroid injection (Left, 2/12/2020); and Epidural steroid injection (Bilateral, 8/5/2020).    Medications:   Alyce has a current medication list which includes the following prescription(s): acetaminophen, acetaminophen-codeine 300-30mg, aloe vera, alprazolam, aspirin, atorvastatin, cholecalciferol (vitamin d3), dextromethorphan-guaifenesin  mg, dicyclomine, fluconazole, fluoxetine, fluticasone propionate, gabapentin, glucosamine/chondr gray a sod, hydrochlorothiazide, hydroxyzine hcl, ibuprofen, meclizine, meloxicam, metoprolol tartrate, montelukast, nitroglycerin, omeprazole, tizanidine,  trazodone, and trazodone.    Allergies:   Review of patient's allergies indicates:   Allergen Reactions    Other omega-3s Nausea And Vomiting     chlorine    Rofecoxib Nausea And Vomiting            Objective     Observation:       Sensation: Light touch:intact to light touch    DTR: intact to light touch    GAIT DEVIATIONS: Alyce displays antalgic gait;    Ankle Range of Motion:   Ankle Right Left   Dorsiflexion 14 deg 7 deg with pain   Plantarflexion 53 deg 50 deg with pain   Inversion 33 deg 33 deg with pain   Eversion 15 deg  5 deg with pain      Strength:   Right Ankle   Left Ankle    Dorsiflexion: 4+/5 Dorsiflexion: 4-/5 with pain   Plantarflexion:  4+/5 Plantarflexion: 4-/5 with pain   Inversion: 4+/5 Inversion: 4-/5 with pain   Eversion: 4+/5 Eversion: 4-/5 with pain          Special Tests:   Left   Posterior Drawer Test Negative    Anterior Drawer Test positive   Squeeze test negative   Johnson test negative   Subtalar Neutral Negative    Heel walking Negative    Toe walking negative   Navicular Drop negative   Windlass test Negative      Observation: increase L lateral ankle edema    Palpation: increase L ATFL pain     Flexibility: decrease gastroc flexibility         CMS Impairment/Limitation/Restriction for FOTO Lower Leg (w/o Knee) Survey  Status Limitation G-Code CMS Severity Modifier  Intake 57% 43% Current Status CK - At least 40 percent but less than 60 percent  Predicted 70% 30% Goal Status+ CJ - At least 20 percent but less than 40 percent  D/C Status CK **only report if this is a one time visit  +Based on FOTO predicted change score    TREATMENT   Treatment Time In: 7:30 am  Treatment Time Out: 7:40 am  Total Treatment time separate from Evaluation: 10 minutes    Alyce received therapeutic exercises to develop strength, endurance, ROM and flexibility for 10 minutes including:  Ankle DF YTB   Ankle PF YTB   Ankle inversion YTB  Ankle eversion YTB    Home Exercises and Patient Education  Provided    Education provided:   - Perform HEP 2 times per day     Written Home Exercises Provided: yes.  Exercises were reviewed and Alyce was able to demonstrate them prior to the end of the session.  Alyce demonstrated good  understanding of the education provided.     See EMR under Patient Instructions for exercises provided 3/8/2022.    Assessment   Alyce is a 61 y.o. female referred to outpatient Physical Therapy with a medical diagnosis of Closed nondisplaced fracture of medial malleolus of right tibia, initial encounter and  Sprain of anterior talofibular ligament of left ankle, initial encounter. Pt presents to therapy after fall last year. Pt had closed fracture of L ankle and also L ATFL sprain. Pt states most pain is localized int he L ATFL region. Pt ambulated with minor antalgic gait pattern. Pt presented to therapy with decrease L ankle AROM and muscles strength. Pt also has increase L lateral ankle edema. During palpation, pt has severe pain at L ATFL region. Pt will benefit from skilled PT services to decrease pain to return to work safe.     Pt prognosis is Good.   Pt will benefit from skilled outpatient Physical Therapy to address the deficits stated above and in the chart below, provide pt/family education, and to maximize pt's level of independence.     Plan of care discussed with patient: Yes  Pt's spiritual, cultural and educational needs considered and patient is agreeable to the plan of care and goals as stated below:     Anticipated Barriers for therapy: None    Medical Necessity is demonstrated by the following  History  Co-morbidities and personal factors that may impact the plan of care Co-morbidities:   Allergy   Anticoagulant long-term use   Anxiety   Arthritis   Breast injury   left 10 yrs ago/ hit in chest   Carpal tunnel syndrome of left wrist   GERD (gastroesophageal reflux disease)   Hyperlipemia   Hypertension, uncontrolled   Interstitial cystitis   Kidney problem   Meningitis    3 months old   Osteoporosis   Polyp of rectum   PONV (postoperative nausea and vomiting)   pt needs meds for PONV   Tachycardia       Personal Factors:   no deficits     Complexity: low   Examination  Body Structures and Functions, activity limitations and participation restrictions that may impact the plan of care Body Regions:   ankle    Body Systems:    ROM  strength  balance  gait  transfers  transitions  motor control  motor learning    Participation Restrictions:   Community ambulation and work    Activity limitations:   Learning and applying knowledge  no deficits    General Tasks and Commands  no deficits    Communication  no deficits    Mobility  walking  moving around using equipment (WC)  using transportation (bus, train, plane, car)  driving (bike, car, motorcycle)    Self care  no deficits    Domestic Life  shopping  cooking  doing house work (cleaning house, washing dishes, laundry)    Interactions/Relationships  no deficits    Life Areas  no deficits    Community and Social Life  community life         Complexity: low  See FOTO outcome assessment   Clinical Presentation stable and uncomplicated low   Decision Making/ Complexity Score: low     Goals:  GOALS: Short Term Goals:  4 weeks  1.Report decreased  in  pain at worse less than  <   / =  5  /10  to increase tolerance for improved functional actvities. On going  2. Pt to improve L ankle range of motion by 25% to allow for improved functional mobility to allow for improvement in IADLs. On going  3. Increased L ankle MMT 1/2 grade  to increase tolerance for ADL and work activities.On going  4. Pt to report able to ascending and descending stairs to improve functional mobility at work.   5. Pt to tolerate HEP to improve ROM and independence with ADL's. On going    Long Term Goals: 8 weeks  1.Report decreased  in  pain at worse  less than  <   / =  2/10  to increase tolerance for improved functional actvities. On going  2.Pt to improve L ankle range  of motion by 75% to allow for improved functional mobility to allow for improvement in IADLs. On going  3.Increased L ankle  MMT 1 grade  to increase tolerance for ADL and work activities.On going  4. Pt will score at least 30% on  FOTO outcome assessment  to increase functional activities and mobility. On going  5. Pt to be Independent with HEP to improve ROM and independence with ADL's. On going      Plan   Plan of care Certification: 3/8/2022 to 6-8-22    Outpatient Physical Therapy 3 times weekly for 12 weeks to include the following interventions: Gait Training, Manual Therapy, Moist Heat/ Ice, Neuromuscular Re-ed, Patient Education, Therapeutic Activities and Therapeutic Exercise. Dry needling.     Roly Delacruz, PT      I CERTIFY THE NEED FOR THESE SERVICES FURNISHED UNDER THIS PLAN OF TREATMENT AND WHILE UNDER MY CARE   Physician's comments:     Physician's Signature: ___________________________________________________

## 2022-03-10 DIAGNOSIS — S82.54XA CLOSED NONDISPLACED FRACTURE OF MEDIAL MALLEOLUS OF RIGHT TIBIA, INITIAL ENCOUNTER: Primary | ICD-10-CM

## 2022-03-10 DIAGNOSIS — M25.572 LEFT ANKLE PAIN, UNSPECIFIED CHRONICITY: ICD-10-CM

## 2022-03-10 DIAGNOSIS — M65.321 TRIGGER INDEX FINGER OF RIGHT HAND: ICD-10-CM

## 2022-03-11 DIAGNOSIS — F41.9 ANXIETY: ICD-10-CM

## 2022-03-11 NOTE — TELEPHONE ENCOUNTER
Last Office Visit Info:   The patient's last visit with Toshia Cintron MD was on 5/21/2021.    The patient's last visit in current department was on Visit date not found.        Last CBC Results:   Lab Results   Component Value Date    WBC 8.01 10/22/2021    HGB 12.3 10/22/2021    HCT 38.0 10/22/2021     10/22/2021       Last CMP Results  Lab Results   Component Value Date     10/22/2021    K 3.7 10/22/2021     10/22/2021    CO2 24 10/22/2021    BUN 10 10/22/2021    CREATININE 0.8 10/22/2021    CALCIUM 9.5 10/22/2021    ALBUMIN 3.7 10/22/2021    AST 22 10/22/2021    ALT 17 10/22/2021       Last Lipids  Lab Results   Component Value Date    CHOL 214 (H) 10/22/2021    TRIG 105 10/22/2021    HDL 43 10/22/2021    LDLCALC 150.0 10/22/2021       Last A1C  Lab Results   Component Value Date    HGBA1C 5.4 08/23/2018       Last TSH  Lab Results   Component Value Date    TSH 0.962 09/25/2020             Current Med Refills  Medication List with Changes/Refills   Current Medications    ACETAMINOPHEN (TYLENOL) 325 MG TABLET    Take 2 tablets (650 mg total) by mouth every 6 (six) hours as needed.       Start Date: 12/26/2020End Date: --    ACETAMINOPHEN-CODEINE 300-30MG (TYLENOL #3) 300-30 MG TAB    Take 1 tablet by mouth every 6 (six) hours as needed (pain).       Start Date: 11/29/2021End Date: --    ALOE VERA 25 MG CAP    Take 1 capsule by mouth once daily.       Start Date: 1/22/2021 End Date: --    ALPRAZOLAM (XANAX) 0.5 MG TABLET    Take 1 tablet (0.5 mg total) by mouth nightly as needed for Insomnia or Anxiety.       Start Date: 9/17/2021 End Date: 10/17/2021    ASPIRIN (ECOTRIN) 81 MG EC TABLET    Take 1 tablet (81 mg total) by mouth once daily.       Start Date: 2/4/2019  End Date: --    ATORVASTATIN (LIPITOR) 20 MG TABLET    Take 1 tablet (20 mg total) by mouth once daily.       Start Date: 10/25/2021End Date: 10/25/2022    CHOLECALCIFEROL, VITAMIN D3, (VITAMIN D3 ORAL)    Take by mouth 2 (two)  times daily.       Start Date: --        End Date: --    DEXTROMETHORPHAN-GUAIFENESIN  MG (MUCINEX DM)  MG PER 12 HR TABLET    Take 1 tablet by mouth every 12 (twelve) hours.       Start Date: --        End Date: --    DICYCLOMINE (BENTYL) 20 MG TABLET    Take 1 tablet (20 mg total) by mouth 2 (two) times daily.       Start Date: 7/23/2021 End Date: --    FLUCONAZOLE (DIFLUCAN) 150 MG TAB    Take 1 tablet (150 mg total) by mouth once daily.       Start Date: 10/25/2021End Date: --    FLUOXETINE 20 MG CAPSULE    Take 1 capsule by mouth once daily in the morning       Start Date: 2/9/2022  End Date: --    FLUTICASONE (FLONASE) 50 MCG/ACTUATION NASAL SPRAY    2 sprays by Each Nare route once daily.       Start Date: --        End Date: --    GABAPENTIN (NEURONTIN) 300 MG CAPSULE    TAKE 2 CAPSULES BY MOUTH IN THE MORNING AND 2 NIGHTLY       Start Date: 10/22/2021End Date: --    GLUCOSAMINE/CHONDR QUINTANILLA A SOD (OSTEO BI-FLEX ORAL)    Take by mouth 2 (two) times daily.       Start Date: --        End Date: --    HYDROCHLOROTHIAZIDE (MICROZIDE) 12.5 MG CAPSULE    Take 1 capsule (12.5 mg total) by mouth once daily.       Start Date: 9/17/2021 End Date: --    HYDROXYZINE HCL (ATARAX) 25 MG TABLET    Take 1 tablet (25 mg total) by mouth nightly.       Start Date: 1/22/2021 End Date: --    IBUPROFEN (ADVIL,MOTRIN) 600 MG TABLET    Take 600 mg by mouth every 6 (six) hours as needed.       Start Date: 12/16/2021End Date: --    MECLIZINE (ANTIVERT) 25 MG TABLET    Take 1 tablet (25 mg total) by mouth 3 (three) times daily as needed.       Start Date: 2/28/2020 End Date: --    MELOXICAM (MOBIC) 7.5 MG TABLET    TAKE 1 TABLET BY MOUTH ONCE DAILY TAKE  ONCE  A  DAY  FOR  2  WEEKS  THEN  AS  NEEDED  TAKE  WITH  FOOD       Start Date: 2/9/2022  End Date: --    METOPROLOL TARTRATE (LOPRESSOR) 25 MG TABLET    Take 1 tablet by mouth once daily       Start Date: 2/18/2022 End Date: --    MONTELUKAST (SINGULAIR) 10 MG TABLET     TAKE 1 TABLET BY MOUTH ONCE DAILY IN THE EVENING       Start Date: 12/13/2021End Date: --    NITROGLYCERIN (NITROSTAT) 0.4 MG SL TABLET    Place 0.4 mg under the tongue.       Start Date: 9/8/2014  End Date: --    OMEPRAZOLE (PRILOSEC) 40 MG CAPSULE    Take 1 capsule (40 mg total) by mouth 2 (two) times daily before meals.       Start Date: 9/17/2021 End Date: --    TIZANIDINE (ZANAFLEX) 4 MG TABLET    Take 1 tablet (4 mg total) by mouth every 8 (eight) hours.       Start Date: 3/22/2021 End Date: 3/22/2022    TRAZODONE (DESYREL) 150 MG TABLET    Take 1 tablet (150 mg total) by mouth every evening.       Start Date: 9/17/2021 End Date: 9/17/2022    TRAZODONE (DESYREL) 150 MG TABLET    TAKE 1 TABLET BY MOUTH ONCE DAILY IN THE EVENING       Start Date: 9/13/2021 End Date: --       Order(s) placed per written order guidelines:       Please advise.

## 2022-03-11 NOTE — TELEPHONE ENCOUNTER
No new care gaps identified.  Powered by [employer]. Reference number: [id number].   3/11/2022 4:31:05 AM CST

## 2022-03-11 NOTE — TELEPHONE ENCOUNTER
No new care gaps identified.  Powered by [email or web address]. Reference number: [id number].   3/11/2022 7:36:16 AM CST

## 2022-03-11 NOTE — TELEPHONE ENCOUNTER

## 2022-03-14 RX ORDER — ALPRAZOLAM 0.5 MG/1
0.5 TABLET ORAL NIGHTLY PRN
Qty: 7 TABLET | Refills: 0 | Status: SHIPPED | OUTPATIENT
Start: 2022-03-14 | End: 2022-06-16

## 2022-03-14 RX ORDER — ALPRAZOLAM 0.5 MG/1
TABLET ORAL
Qty: 7 TABLET | Refills: 0 | OUTPATIENT
Start: 2022-03-14

## 2022-03-16 ENCOUNTER — OFFICE VISIT (OUTPATIENT)
Dept: ORTHOPEDICS | Facility: CLINIC | Age: 62
End: 2022-03-16
Attending: PHYSICIAN ASSISTANT
Payer: COMMERCIAL

## 2022-03-16 ENCOUNTER — APPOINTMENT (OUTPATIENT)
Dept: RADIOLOGY | Facility: HOSPITAL | Age: 62
End: 2022-03-16
Attending: PHYSICIAN ASSISTANT
Payer: COMMERCIAL

## 2022-03-16 VITALS
OXYGEN SATURATION: 93 % | SYSTOLIC BLOOD PRESSURE: 128 MMHG | HEIGHT: 67 IN | WEIGHT: 205.25 LBS | RESPIRATION RATE: 18 BRPM | HEART RATE: 64 BPM | BODY MASS INDEX: 32.21 KG/M2 | DIASTOLIC BLOOD PRESSURE: 64 MMHG

## 2022-03-16 DIAGNOSIS — S82.54XA CLOSED NONDISPLACED FRACTURE OF MEDIAL MALLEOLUS OF RIGHT TIBIA, INITIAL ENCOUNTER: ICD-10-CM

## 2022-03-16 DIAGNOSIS — M17.0 PRIMARY OSTEOARTHRITIS OF BOTH KNEES: ICD-10-CM

## 2022-03-16 DIAGNOSIS — S93.492D SPRAIN OF ANTERIOR TALOFIBULAR LIGAMENT OF LEFT ANKLE, SUBSEQUENT ENCOUNTER: ICD-10-CM

## 2022-03-16 DIAGNOSIS — M25.572 LEFT ANKLE PAIN, UNSPECIFIED CHRONICITY: ICD-10-CM

## 2022-03-16 DIAGNOSIS — S82.54XD CLOSED NONDISPLACED FRACTURE OF MEDIAL MALLEOLUS OF RIGHT TIBIA WITH ROUTINE HEALING, SUBSEQUENT ENCOUNTER: Primary | ICD-10-CM

## 2022-03-16 PROCEDURE — 73610 X-RAY EXAM OF ANKLE: CPT | Mod: 26,LT,, | Performed by: RADIOLOGY

## 2022-03-16 PROCEDURE — 3078F DIAST BP <80 MM HG: CPT | Mod: CPTII,S$GLB,, | Performed by: PHYSICIAN ASSISTANT

## 2022-03-16 PROCEDURE — 3008F PR BODY MASS INDEX (BMI) DOCUMENTED: ICD-10-PCS | Mod: CPTII,S$GLB,, | Performed by: PHYSICIAN ASSISTANT

## 2022-03-16 PROCEDURE — 99999 PR PBB SHADOW E&M-EST. PATIENT-LVL V: ICD-10-PCS | Mod: PBBFAC,,, | Performed by: PHYSICIAN ASSISTANT

## 2022-03-16 PROCEDURE — 1160F PR REVIEW ALL MEDS BY PRESCRIBER/CLIN PHARMACIST DOCUMENTED: ICD-10-PCS | Mod: CPTII,S$GLB,, | Performed by: PHYSICIAN ASSISTANT

## 2022-03-16 PROCEDURE — 20610 LARGE JOINT ASPIRATION/INJECTION: BILATERAL KNEE: ICD-10-PCS | Mod: 50,S$GLB,, | Performed by: PHYSICIAN ASSISTANT

## 2022-03-16 PROCEDURE — 20610 DRAIN/INJ JOINT/BURSA W/O US: CPT | Mod: 50,S$GLB,, | Performed by: PHYSICIAN ASSISTANT

## 2022-03-16 PROCEDURE — 1160F RVW MEDS BY RX/DR IN RCRD: CPT | Mod: CPTII,S$GLB,, | Performed by: PHYSICIAN ASSISTANT

## 2022-03-16 PROCEDURE — 99999 PR PBB SHADOW E&M-EST. PATIENT-LVL V: CPT | Mod: PBBFAC,,, | Performed by: PHYSICIAN ASSISTANT

## 2022-03-16 PROCEDURE — 1159F PR MEDICATION LIST DOCUMENTED IN MEDICAL RECORD: ICD-10-PCS | Mod: CPTII,S$GLB,, | Performed by: PHYSICIAN ASSISTANT

## 2022-03-16 PROCEDURE — 1159F MED LIST DOCD IN RCRD: CPT | Mod: CPTII,S$GLB,, | Performed by: PHYSICIAN ASSISTANT

## 2022-03-16 PROCEDURE — 3078F PR MOST RECENT DIASTOLIC BLOOD PRESSURE < 80 MM HG: ICD-10-PCS | Mod: CPTII,S$GLB,, | Performed by: PHYSICIAN ASSISTANT

## 2022-03-16 PROCEDURE — 3008F BODY MASS INDEX DOCD: CPT | Mod: CPTII,S$GLB,, | Performed by: PHYSICIAN ASSISTANT

## 2022-03-16 PROCEDURE — 99214 OFFICE O/P EST MOD 30 MIN: CPT | Mod: 25,S$GLB,, | Performed by: PHYSICIAN ASSISTANT

## 2022-03-16 PROCEDURE — 3074F PR MOST RECENT SYSTOLIC BLOOD PRESSURE < 130 MM HG: ICD-10-PCS | Mod: CPTII,S$GLB,, | Performed by: PHYSICIAN ASSISTANT

## 2022-03-16 PROCEDURE — 99214 PR OFFICE/OUTPT VISIT, EST, LEVL IV, 30-39 MIN: ICD-10-PCS | Mod: 25,S$GLB,, | Performed by: PHYSICIAN ASSISTANT

## 2022-03-16 PROCEDURE — 3074F SYST BP LT 130 MM HG: CPT | Mod: CPTII,S$GLB,, | Performed by: PHYSICIAN ASSISTANT

## 2022-03-16 PROCEDURE — 73610 XR ANKLE COMPLETE 3 VIEW LEFT: ICD-10-PCS | Mod: 26,LT,, | Performed by: RADIOLOGY

## 2022-03-16 PROCEDURE — 73610 X-RAY EXAM OF ANKLE: CPT | Mod: TC,FY,PN,LT

## 2022-03-16 RX ORDER — TRIAMCINOLONE ACETONIDE 40 MG/ML
40 INJECTION, SUSPENSION INTRA-ARTICULAR; INTRAMUSCULAR
Status: DISCONTINUED | OUTPATIENT
Start: 2022-03-16 | End: 2022-03-16 | Stop reason: HOSPADM

## 2022-03-16 RX ADMIN — TRIAMCINOLONE ACETONIDE 40 MG: 40 INJECTION, SUSPENSION INTRA-ARTICULAR; INTRAMUSCULAR at 08:03

## 2022-03-16 NOTE — PROCEDURES
Large Joint Aspiration/Injection: bilateral knee    Date/Time: 3/16/2022 8:30 AM  Performed by: Violeta Serrano PA-C  Authorized by: Violeta Serrano PA-C     Consent Done?:  Yes (Verbal)  Indications:  Pain  Prep: patient was prepped and draped in usual sterile fashion    Local anesthetic:  Topical anesthetic    Details:  Needle Size:  22 G  Approach:  Anterolateral  Location:  Knee  Laterality:  Bilateral  Site:  Bilateral knee  Medications (Right):  40 mg triamcinolone acetonide 40 mg/mL  Medications (Left):  40 mg triamcinolone acetonide 40 mg/mL  Patient tolerance:  Patient tolerated the procedure well with no immediate complications

## 2022-03-16 NOTE — PROGRESS NOTES
"Patient ID: Alyce Zurita is a 61 y.o. female.    Chief Complaint: Pain of the Left Ankle      Initial visit (11/29/21): Alyce Zurita is a 61 y.o. female who presents today complaining of Injury, Pain, and Swelling of the Left Ankle     DOI: 11/27/21  Fell down a few stairs while out of town over Thanksgiving      Interval history 12/13/21  Pain almost resolved   Has been compliant with WB restrictions      Interval history 1/31/22  Has mild medial pain  Now 8 weeks out   Does not have any pain walking in the boot  Has more pain lateral from ATFL   Also complaining of pain in RIF while typing     Interval history 3/16/22  She feels she is doing better but still has soreness with increased walking and activity.  She has only been to PT once- last week.  Mostly painful along lateral ankle.  Notices difficulty with bending and squatting.  She also complains of bilateral knee pain today- was seeing Dr. Luna in the past, has had good relief with injections.  Last injections were about one year ago.      PMH/PSH/FamHx/SocHx:    Unchanged from prior visit.    ROS:  Constitution: Negative for chills, fever and weakness.   Respiratory: Negative for cough and shortness of breath.   Musculoskeletal: Positive for bilateral knee, left ankle  Psychiatric/Behavioral: The patient is not nervous/anxious.       PHYSICAL EXAM:   /64 (BP Location: Left arm, Patient Position: Sitting, BP Method: Large (Manual))   Pulse 64   Resp 18   Ht 5' 7" (1.702 m)   Wt 93.1 kg (205 lb 4 oz)   LMP  (LMP Unknown)   SpO2 (!) 93%   BMI 32.15 kg/m²   Left ankle  Skin intact  No swelling or warmth  FROM  TTP along ATFL  Minimal tenderness at medial mal  NVI    Bilateral knee  Skin intact  No effusion  ROM 0-130  5/5 quad, 5/5 hamstring      IMAGING: Previous knee x-rays reviewed- Mild DJD bilaterally    X-rays of the left ankle, personally reviewed by me, demonstrate healed medial malleolus fracture.  No fracture or " dislocation.    ASSESSMENT/PLAN:    Alyce was seen today for pain.    Diagnoses and all orders for this visit:    Closed nondisplaced fracture of medial malleolus of right tibia with routine healing, subsequent encounter    Sprain of anterior talofibular ligament of left ankle, subsequent encounter    Primary osteoarthritis of both knees    - Recommend continue PT  - Light duty at work until she is able to complete therapy- follow up 6 weeks.  Anticipate release at that time  - Bilateral knee CSI today- recommend rest, ice, activity modification

## 2022-03-28 DIAGNOSIS — J30.89 NON-SEASONAL ALLERGIC RHINITIS DUE TO OTHER ALLERGIC TRIGGER: ICD-10-CM

## 2022-03-29 RX ORDER — MONTELUKAST SODIUM 10 MG/1
TABLET ORAL
Qty: 90 TABLET | Refills: 0 | Status: SHIPPED | OUTPATIENT
Start: 2022-03-29 | End: 2022-06-16

## 2022-03-29 NOTE — TELEPHONE ENCOUNTER
No new care gaps identified.  Powered by [employer] by [employer]. Reference number: [id number].   3/28/2022 7:59:53 PM CDT

## 2022-03-30 DIAGNOSIS — J30.89 NON-SEASONAL ALLERGIC RHINITIS DUE TO OTHER ALLERGIC TRIGGER: ICD-10-CM

## 2022-03-30 RX ORDER — MONTELUKAST SODIUM 10 MG/1
10 TABLET ORAL NIGHTLY
Qty: 90 TABLET | Refills: 0 | Status: CANCELLED | OUTPATIENT
Start: 2022-03-30

## 2022-03-30 NOTE — TELEPHONE ENCOUNTER
Refill Authorization Note   Alyce Zurita  is requesting a refill authorization.  Brief Assessment and Rationale for Refill:  Approve     Medication Therapy Plan:       Medication Reconciliation Completed: No   Comments:   --->Care Gap information included below if applicable.       Requested Prescriptions   Pending Prescriptions Disp Refills    montelukast (SINGULAIR) 10 mg tablet [Pharmacy Med Name: Montelukast Sodium 10 MG Oral Tablet] 90 tablet 0     Sig: TAKE 1 TABLET BY MOUTH ONCE DAILY IN THE EVENING       Pulmonology:  Leukotriene Inhibitors Passed - 3/29/2022  7:33 PM        Passed - Patient is at least 18 years old        Passed - Valid encounter within last 15 months     Recent Visits  Date Type Provider Dept   05/21/21 Office Visit Toshia Cintron MD [facility]   03/16/21 Office Visit Toshia Cintron MD [facility]   12/28/20 Office Visit Toshia Cintron MD [facility]   12/02/20 Office Visit Toshia Cintron MD [facility]   08/06/20 Office Visit Toshia Cintron MD [facility]   07/27/20 Office Visit Toshia Cintron MD [facility]   05/29/20 Office Visit Toshia Cintron MD [facility]   04/28/20 Office Visit Toshia Cintron MD [facility]   Showing recent visits within past 720 days and meeting all other requirements  Future Appointments  No visits were found meeting these conditions.  Showing future appointments within next 150 days and meeting all other requirements      Future Appointments              In 2 days Roly Delacruz, PT [facility] [place] [facility]    In 1 week Rolyleander Delacruz, PT [facility] [place] [facility]    In 2 weeks Rolydennise Delacruz, PT [facility] [place] [facility]    In 4 weeks ABIOLA [facility] [place] [facility]                Passed - Matches previous order       Previous Authorizing Provider: Toshia Cintron MD (montelukast (SINGULAIR) 10 mg  tablet)  [id number] - [place], [place] - [address]            Passed - No ED/Hospital visits since last PCP visit     Last PCP Visit: 5/21/2021 Last Admission: 8/5/2020 Last ED Visit: 12/26/2020              Appointments  past 12m or future 3m with PCP    Date Provider   Last Visit   5/21/2021 Toshia Cintron MD   Next Visit   Visit date not found Toshia Cintron MD   ED visits in past 90 days: 0     Note composed:7:34 PM 03/29/2022

## 2022-03-30 NOTE — PROGRESS NOTES
[facility]   Physical Therapy Treatment Note     Name: Alyce RODARTE [facility]  Clinic Number: [id number]    Therapy Diagnosis:   Encounter Diagnoses   Name Primary?    Sprain of anterior talofibular ligament of left ankle, initial encounter Yes    Decreased range of motion of left ankle     Ankle weakness     Gait difficulty      Physician: Joyce Ross MD    Visit Date: 3/31/2022    Physician Orders: PT Eval and Treat   Medical Diagnosis from Referral:   S82.54XA (ICD-10-CM) - Closed nondisplaced fracture of medial malleolus of right tibia, initial encounter   S93.492A (ICD-10-CM) - Sprain of anterior talofibular ligament of left ankle, initial encounter         Evaluation Date: 3/8/2022  Authorization Period Expiration: 12/31/2022  Plan of Care Expiration: 6-8-22  Visit # / Visits authorized: 1/ 20   Progress Note Due:4-8-22  FOTO: 1/ 1     Precautions: Standard        Time In: 8:30 am  Time Out: 9:25 am  Total Billable Time: 45 minutes      SUBJECTIVE     Pt reports: that she cont with L lateral ankle pain. Pt states pain is about 4/10. She is experience aching pain.   She was compliant with home exercise program.  Response to previous treatment: None  Functional change: No change     Pain: 4/10  Location: left lateral ankle      OBJECTIVE     Objective Measures updated at progress report unless specified.       TREATMENT     Alyce received the treatments listed below:      received therapeutic exercises to develop strength and ROM for 35  minutes including:    Nustep 6 min  Long sitting calf stretch 3x30   Ankle DF YTB 3x10  Ankle PF YTB  3x10  Ankle inversion YTB 3x10  Ankle eversion YTB 3x10   SL ankle eversion 20x  SL and inversion 20x   Seated ankle circles 20x   Seated ankle windshields wiper 20x       received the following manual therapy techniques: Soft tissue Mobilization were applied to the: [place] ankle for 10  minutes, including:  L ATFL STM    PATIENT EDUCATION AND HOME  EXERCISES     Home Exercises and Patient Education Provided     Education provided:   - Perform HEP 2 times per day      Written Home Exercises Provided: yes.  Exercises were reviewed and Alyce was able to demonstrate them prior to the end of the session.  Alyce demonstrated good  understanding of the education provided.      See EMR under Patient Instructions for exercises provided 3/8/2022.    ASSESSMENT     Pt responded to first tx well. Pt ambulated with minor antalgic gait today. She is experience pain at L ATFL region and behind L malleolus. Pt required mod v/c's to perform exercises today.  HEP was given today. STM was performed with increase pain and tenderness at L ATFL region and behind malleolus. Pain subsided in the end of PT session. Pt will not come back to therapy due to insurance reasons. Plan to be d/c today.     Alyce Is progressing well towards her goals.   Pt prognosis is Good.     Pt will continue to benefit from skilled outpatient physical therapy to address the deficits listed in the problem list box on initial evaluation, provide pt/family education and to maximize pt's level of independence in the home and community environment.     Pt's spiritual, cultural and educational needs considered and pt agreeable to plan of care and goals.     Anticipated barriers to physical therapy: none    Goals:  GOALS: Short Term Goals:  4 weeks  1.Report decreased  in  pain at worse less than  <   / =  5  /10  to increase tolerance for improved functional actvities. Goal not met 3-31-22  2. Pt to improve L ankle range of motion by 25% to allow for improved functional mobility to allow for improvement in IADLs. Goal not met 3-31-22  3. Increased L ankle MMT 1/2 grade  to increase tolerance for ADL and work activities.Goal not met 3-31-22  4. Pt to report able to ascending and descending stairs to improve functional mobility at work. Goal not met 3-31-22  5. Pt to tolerate HEP to improve ROM and independence  with ADL's. Goal met 3-31-22     Long Term Goals: 8 weeks  1.Report decreased  in  pain at worse  less than  <   / =  2/10  to increase tolerance for improved functional actvities. Goal not met 3-31-22  2.Pt to improve L ankle range of motion by 75% to allow for improved functional mobility to allow for improvement in IADLs. Goal not met 3-31-22  3.Increased L ankle  MMT 1 grade  to increase tolerance for ADL and work activities.Goal not met 3-31-22  4. Pt will score at least 30% on  FOTO outcome assessment  to increase functional activities and mobility. Goal not met 3-31-22  5. Pt to be Independent with HEP to improve ROM and independence with ADL's. Goal  met 3-31-22    PLAN      Pt will not come back to therapy due to insurance reasons. Plan to be d/c today.     Roly Delacruz, PT

## 2022-03-31 ENCOUNTER — CLINICAL SUPPORT (OUTPATIENT)
Dept: REHABILITATION | Facility: HOSPITAL | Age: 62
End: 2022-03-31
Attending: ORTHOPAEDIC SURGERY
Payer: COMMERCIAL

## 2022-03-31 DIAGNOSIS — R29.898 ANKLE WEAKNESS: ICD-10-CM

## 2022-03-31 DIAGNOSIS — R26.9 GAIT DIFFICULTY: ICD-10-CM

## 2022-03-31 DIAGNOSIS — M25.672 DECREASED RANGE OF MOTION OF LEFT ANKLE: ICD-10-CM

## 2022-03-31 DIAGNOSIS — S93.492A SPRAIN OF ANTERIOR TALOFIBULAR LIGAMENT OF LEFT ANKLE, INITIAL ENCOUNTER: Primary | ICD-10-CM

## 2022-03-31 PROCEDURE — 97140 MANUAL THERAPY 1/> REGIONS: CPT | Mod: PN

## 2022-03-31 PROCEDURE — 97110 THERAPEUTIC EXERCISES: CPT | Mod: PN,97

## 2022-03-31 NOTE — TELEPHONE ENCOUNTER
No new care gaps identified.  Powered by [employer]. Reference number: [id number].   3/30/2022 8:10:10 PM CDT

## 2022-04-19 DIAGNOSIS — F41.9 ANXIETY: ICD-10-CM

## 2022-04-19 DIAGNOSIS — F51.01 PRIMARY INSOMNIA: ICD-10-CM

## 2022-04-20 RX ORDER — ALPRAZOLAM 0.5 MG/1
0.5 TABLET ORAL NIGHTLY PRN
Qty: 7 TABLET | Refills: 0 | OUTPATIENT
Start: 2022-04-20 | End: 2022-05-20

## 2022-04-20 RX ORDER — TRAZODONE HYDROCHLORIDE 150 MG/1
150 TABLET ORAL NIGHTLY
Qty: 90 TABLET | Refills: 0 | Status: SHIPPED | OUTPATIENT
Start: 2022-04-20 | End: 2022-06-16

## 2022-04-20 NOTE — TELEPHONE ENCOUNTER
No new care gaps identified.  Powered by [employer]. Reference number: [id number].   4/19/2022 8:27:11 PM CDT

## 2022-04-20 NOTE — TELEPHONE ENCOUNTER
Care Due:                  Date            Visit Type   Department     Provider  --------------------------------------------------------------------------------                                EP -                              PRIMARY      [facility]  Last Visit: 05-      CARE ([facility])   MEDICINE       Toshia Cintron  Next Visit: None Scheduled  None         None Found                                                            Last  Test          Frequency    Reason                     Performed    Due Date  --------------------------------------------------------------------------------    Office Visit  12 months..  hydroCHLOROthiazide,       05- 05-                             montelukast, omeprazole,                             traZODone................    Powered by [employer]. Reference number: [id number].   4/19/2022 8:24:42 PM CDT

## 2022-04-20 NOTE — TELEPHONE ENCOUNTER
Last Office Visit Info:   The patient's last visit with Toshia Cintron MD was on 5/21/2021.    The patient's last visit in current department was on 10/22/2021.        Last CBC Results:   Lab Results   Component Value Date    WBC 8.01 10/22/2021    HGB 12.3 10/22/2021    HCT 38.0 10/22/2021     10/22/2021       Last CMP Results  Lab Results   Component Value Date     10/22/2021    K 3.7 10/22/2021     10/22/2021    CO2 24 10/22/2021    BUN 10 10/22/2021    CREATININE 0.8 10/22/2021    CALCIUM 9.5 10/22/2021    ALBUMIN 3.7 10/22/2021    AST 22 10/22/2021    ALT 17 10/22/2021       Last Lipids  Lab Results   Component Value Date    CHOL 214 (H) 10/22/2021    TRIG 105 10/22/2021    HDL 43 10/22/2021    LDLCALC 150.0 10/22/2021       Last A1C  Lab Results   Component Value Date    HGBA1C 5.4 08/23/2018       Last TSH  Lab Results   Component Value Date    TSH 0.962 09/25/2020             Current Med Refills  Medication List with Changes/Refills   Current Medications    ACETAMINOPHEN (TYLENOL) 325 MG TABLET    Take 2 tablets (650 mg total) by mouth every 6 (six) hours as needed.       Start Date: 12/26/2020End Date: --    ACETAMINOPHEN-CODEINE 300-30MG (TYLENOL #3) 300-30 MG TAB    Take 1 tablet by mouth every 6 (six) hours as needed (pain).       Start Date: 11/29/2021End Date: --    ALOE VERA 25 MG CAP    Take 1 capsule by mouth once daily.       Start Date: 1/22/2021 End Date: --    ALPRAZOLAM (XANAX) 0.5 MG TABLET    Take 1 tablet (0.5 mg total) by mouth nightly as needed for Insomnia or Anxiety.       Start Date: 3/14/2022 End Date: 4/13/2022    ASPIRIN (ECOTRIN) 81 MG EC TABLET    Take 1 tablet (81 mg total) by mouth once daily.       Start Date: 2/4/2019  End Date: --    ATORVASTATIN (LIPITOR) 20 MG TABLET    Take 1 tablet (20 mg total) by mouth once daily.       Start Date: 10/25/2021End Date: 10/25/2022    CHOLECALCIFEROL, VITAMIN D3, (VITAMIN D3 ORAL)    Take by mouth 2 (two) times  daily.       Start Date: --        End Date: --    DEXTROMETHORPHAN-GUAIFENESIN  MG (MUCINEX DM)  MG PER 12 HR TABLET    Take 1 tablet by mouth every 12 (twelve) hours.       Start Date: --        End Date: --    DICYCLOMINE (BENTYL) 20 MG TABLET    Take 1 tablet (20 mg total) by mouth 2 (two) times daily.       Start Date: 7/23/2021 End Date: --    FLUOXETINE 20 MG CAPSULE    Take 1 capsule by mouth once daily in the morning       Start Date: 2/9/2022  End Date: --    FLUTICASONE (FLONASE) 50 MCG/ACTUATION NASAL SPRAY    2 sprays by Each Nare route once daily.       Start Date: --        End Date: --    GABAPENTIN (NEURONTIN) 300 MG CAPSULE    TAKE 2 CAPSULES BY MOUTH IN THE MORNING AND 2 NIGHTLY       Start Date: 10/22/2021End Date: --    GLUCOSAMINE/CHONDR QUINTANILLA A SOD (OSTEO BI-FLEX ORAL)    Take by mouth 2 (two) times daily.       Start Date: --        End Date: --    HYDROCHLOROTHIAZIDE (MICROZIDE) 12.5 MG CAPSULE    Take 1 capsule (12.5 mg total) by mouth once daily.       Start Date: 9/17/2021 End Date: --    HYDROXYZINE HCL (ATARAX) 25 MG TABLET    Take 1 tablet (25 mg total) by mouth nightly.       Start Date: 1/22/2021 End Date: --    IBUPROFEN (ADVIL,MOTRIN) 600 MG TABLET    Take 600 mg by mouth every 6 (six) hours as needed.       Start Date: 12/16/2021End Date: --    MECLIZINE (ANTIVERT) 25 MG TABLET    Take 1 tablet (25 mg total) by mouth 3 (three) times daily as needed.       Start Date: 2/28/2020 End Date: --    MELOXICAM (MOBIC) 7.5 MG TABLET    Take 1 tablet (7.5 mg total) by mouth once daily.       Start Date: 3/11/2022 End Date: --    METOPROLOL TARTRATE (LOPRESSOR) 25 MG TABLET    Take 1 tablet by mouth once daily       Start Date: 2/18/2022 End Date: --    MONTELUKAST (SINGULAIR) 10 MG TABLET    TAKE 1 TABLET BY MOUTH ONCE DAILY IN THE EVENING       Start Date: 3/29/2022 End Date: --    NITROGLYCERIN (NITROSTAT) 0.4 MG SL TABLET    Place 0.4 mg under the tongue.       Start Date:  9/8/2014  End Date: --    OMEPRAZOLE (PRILOSEC) 40 MG CAPSULE    Take 1 capsule (40 mg total) by mouth 2 (two) times daily before meals.       Start Date: 9/17/2021 End Date: --    TRAZODONE (DESYREL) 150 MG TABLET    Take 1 tablet (150 mg total) by mouth every evening.       Start Date: 9/17/2021 End Date: 9/17/2022    TRAZODONE (DESYREL) 150 MG TABLET    TAKE 1 TABLET BY MOUTH ONCE DAILY IN THE EVENING       Start Date: 9/13/2021 End Date: --       Order(s) placed per written order guidelines:     Please advise.

## 2022-04-20 NOTE — TELEPHONE ENCOUNTER
Last Office Visit Info:   The patient's last visit with Toshia Cintron MD was on 5/21/2021.    The patient's last visit in current department was on Visit date not found.        Last CBC Results:   Lab Results   Component Value Date    WBC 8.01 10/22/2021    HGB 12.3 10/22/2021    HCT 38.0 10/22/2021     10/22/2021       Last CMP Results  Lab Results   Component Value Date     10/22/2021    K 3.7 10/22/2021     10/22/2021    CO2 24 10/22/2021    BUN 10 10/22/2021    CREATININE 0.8 10/22/2021    CALCIUM 9.5 10/22/2021    ALBUMIN 3.7 10/22/2021    AST 22 10/22/2021    ALT 17 10/22/2021       Last Lipids  Lab Results   Component Value Date    CHOL 214 (H) 10/22/2021    TRIG 105 10/22/2021    HDL 43 10/22/2021    LDLCALC 150.0 10/22/2021       Last A1C  Lab Results   Component Value Date    HGBA1C 5.4 08/23/2018       Last TSH  Lab Results   Component Value Date    TSH 0.962 09/25/2020             Current Med Refills  Medication List with Changes/Refills   Current Medications    ACETAMINOPHEN (TYLENOL) 325 MG TABLET    Take 2 tablets (650 mg total) by mouth every 6 (six) hours as needed.       Start Date: 12/26/2020End Date: --    ACETAMINOPHEN-CODEINE 300-30MG (TYLENOL #3) 300-30 MG TAB    Take 1 tablet by mouth every 6 (six) hours as needed (pain).       Start Date: 11/29/2021End Date: --    ALOE VERA 25 MG CAP    Take 1 capsule by mouth once daily.       Start Date: 1/22/2021 End Date: --    ALPRAZOLAM (XANAX) 0.5 MG TABLET    Take 1 tablet (0.5 mg total) by mouth nightly as needed for Insomnia or Anxiety.       Start Date: 3/14/2022 End Date: 4/13/2022    ASPIRIN (ECOTRIN) 81 MG EC TABLET    Take 1 tablet (81 mg total) by mouth once daily.       Start Date: 2/4/2019  End Date: --    ATORVASTATIN (LIPITOR) 20 MG TABLET    Take 1 tablet (20 mg total) by mouth once daily.       Start Date: 10/25/2021End Date: 10/25/2022    CHOLECALCIFEROL, VITAMIN D3, (VITAMIN D3 ORAL)    Take by mouth 2 (two)  times daily.       Start Date: --        End Date: --    DEXTROMETHORPHAN-GUAIFENESIN  MG (MUCINEX DM)  MG PER 12 HR TABLET    Take 1 tablet by mouth every 12 (twelve) hours.       Start Date: --        End Date: --    DICYCLOMINE (BENTYL) 20 MG TABLET    Take 1 tablet (20 mg total) by mouth 2 (two) times daily.       Start Date: 7/23/2021 End Date: --    FLUOXETINE 20 MG CAPSULE    Take 1 capsule by mouth once daily in the morning       Start Date: 2/9/2022  End Date: --    FLUTICASONE (FLONASE) 50 MCG/ACTUATION NASAL SPRAY    2 sprays by Each Nare route once daily.       Start Date: --        End Date: --    GABAPENTIN (NEURONTIN) 300 MG CAPSULE    TAKE 2 CAPSULES BY MOUTH IN THE MORNING AND 2 NIGHTLY       Start Date: 10/22/2021End Date: --    GLUCOSAMINE/CHONDR QUINTANILLA A SOD (OSTEO BI-FLEX ORAL)    Take by mouth 2 (two) times daily.       Start Date: --        End Date: --    HYDROCHLOROTHIAZIDE (MICROZIDE) 12.5 MG CAPSULE    Take 1 capsule (12.5 mg total) by mouth once daily.       Start Date: 9/17/2021 End Date: --    HYDROXYZINE HCL (ATARAX) 25 MG TABLET    Take 1 tablet (25 mg total) by mouth nightly.       Start Date: 1/22/2021 End Date: --    IBUPROFEN (ADVIL,MOTRIN) 600 MG TABLET    Take 600 mg by mouth every 6 (six) hours as needed.       Start Date: 12/16/2021End Date: --    MECLIZINE (ANTIVERT) 25 MG TABLET    Take 1 tablet (25 mg total) by mouth 3 (three) times daily as needed.       Start Date: 2/28/2020 End Date: --    MELOXICAM (MOBIC) 7.5 MG TABLET    Take 1 tablet (7.5 mg total) by mouth once daily.       Start Date: 3/11/2022 End Date: --    METOPROLOL TARTRATE (LOPRESSOR) 25 MG TABLET    Take 1 tablet by mouth once daily       Start Date: 2/18/2022 End Date: --    MONTELUKAST (SINGULAIR) 10 MG TABLET    TAKE 1 TABLET BY MOUTH ONCE DAILY IN THE EVENING       Start Date: 3/29/2022 End Date: --    NITROGLYCERIN (NITROSTAT) 0.4 MG SL TABLET    Place 0.4 mg under the tongue.       Start Date:  9/8/2014  End Date: --    OMEPRAZOLE (PRILOSEC) 40 MG CAPSULE    Take 1 capsule (40 mg total) by mouth 2 (two) times daily before meals.       Start Date: 9/17/2021 End Date: --    TRAZODONE (DESYREL) 150 MG TABLET    Take 1 tablet (150 mg total) by mouth every evening.       Start Date: 9/17/2021 End Date: 9/17/2022    TRAZODONE (DESYREL) 150 MG TABLET    TAKE 1 TABLET BY MOUTH ONCE DAILY IN THE EVENING       Start Date: 9/13/2021 End Date: --       Order(s) placed per written order guidelines:     Please advise.

## 2022-04-21 DIAGNOSIS — M54.2 CHRONIC NECK PAIN: ICD-10-CM

## 2022-04-21 DIAGNOSIS — G89.29 CHRONIC NECK PAIN: ICD-10-CM

## 2022-04-21 DIAGNOSIS — R10.9 FUNCTIONAL ABDOMINAL PAIN SYNDROME: ICD-10-CM

## 2022-04-21 RX ORDER — GABAPENTIN 300 MG/1
CAPSULE ORAL
Qty: 360 CAPSULE | Refills: 1 | Status: SHIPPED | OUTPATIENT
Start: 2022-04-21 | End: 2022-06-16

## 2022-04-21 RX ORDER — DICYCLOMINE HYDROCHLORIDE 20 MG/1
20 TABLET ORAL 2 TIMES DAILY
Qty: 60 TABLET | Refills: 9 | Status: SHIPPED | OUTPATIENT
Start: 2022-04-21 | End: 2022-06-16

## 2022-04-21 NOTE — TELEPHONE ENCOUNTER
Last Office Visit Info:   The patient's last visit with Toshia Cintron MD was on 10/22/2021.    The patient's last visit in current department was on Visit date not found.        Last CBC Results:   Lab Results   Component Value Date    WBC 8.01 10/22/2021    HGB 12.3 10/22/2021    HCT 38.0 10/22/2021     10/22/2021       Last CMP Results  Lab Results   Component Value Date     10/22/2021    K 3.7 10/22/2021     10/22/2021    CO2 24 10/22/2021    BUN 10 10/22/2021    CREATININE 0.8 10/22/2021    CALCIUM 9.5 10/22/2021    ALBUMIN 3.7 10/22/2021    AST 22 10/22/2021    ALT 17 10/22/2021       Last Lipids  Lab Results   Component Value Date    CHOL 214 (H) 10/22/2021    TRIG 105 10/22/2021    HDL 43 10/22/2021    LDLCALC 150.0 10/22/2021       Last A1C  Lab Results   Component Value Date    HGBA1C 5.4 08/23/2018       Last TSH  Lab Results   Component Value Date    TSH 0.962 09/25/2020             Current Med Refills  Medication List with Changes/Refills   Current Medications    ACETAMINOPHEN (TYLENOL) 325 MG TABLET    Take 2 tablets (650 mg total) by mouth every 6 (six) hours as needed.       Start Date: 12/26/2020End Date: --    ACETAMINOPHEN-CODEINE 300-30MG (TYLENOL #3) 300-30 MG TAB    Take 1 tablet by mouth every 6 (six) hours as needed (pain).       Start Date: 11/29/2021End Date: --    ALOE VERA 25 MG CAP    Take 1 capsule by mouth once daily.       Start Date: 1/22/2021 End Date: --    ALPRAZOLAM (XANAX) 0.5 MG TABLET    Take 1 tablet (0.5 mg total) by mouth nightly as needed for Insomnia or Anxiety.       Start Date: 3/14/2022 End Date: 4/13/2022    ASPIRIN (ECOTRIN) 81 MG EC TABLET    Take 1 tablet (81 mg total) by mouth once daily.       Start Date: 2/4/2019  End Date: --    ATORVASTATIN (LIPITOR) 20 MG TABLET    Take 1 tablet (20 mg total) by mouth once daily.       Start Date: 10/25/2021End Date: 10/25/2022    CHOLECALCIFEROL, VITAMIN D3, (VITAMIN D3 ORAL)    Take by mouth 2 (two)  times daily.       Start Date: --        End Date: --    DEXTROMETHORPHAN-GUAIFENESIN  MG (MUCINEX DM)  MG PER 12 HR TABLET    Take 1 tablet by mouth every 12 (twelve) hours.       Start Date: --        End Date: --    DICYCLOMINE (BENTYL) 20 MG TABLET    Take 1 tablet (20 mg total) by mouth 2 (two) times daily.       Start Date: 7/23/2021 End Date: --    FLUOXETINE 20 MG CAPSULE    Take 1 capsule by mouth once daily in the morning       Start Date: 2/9/2022  End Date: --    FLUTICASONE (FLONASE) 50 MCG/ACTUATION NASAL SPRAY    2 sprays by Each Nare route once daily.       Start Date: --        End Date: --    GABAPENTIN (NEURONTIN) 300 MG CAPSULE    TAKE 2 CAPSULES BY MOUTH IN THE MORNING AND 2 NIGHTLY       Start Date: 10/22/2021End Date: --    GLUCOSAMINE/CHONDR QUINTANILLA A SOD (OSTEO BI-FLEX ORAL)    Take by mouth 2 (two) times daily.       Start Date: --        End Date: --    HYDROCHLOROTHIAZIDE (MICROZIDE) 12.5 MG CAPSULE    Take 1 capsule (12.5 mg total) by mouth once daily.       Start Date: 9/17/2021 End Date: --    HYDROXYZINE HCL (ATARAX) 25 MG TABLET    Take 1 tablet (25 mg total) by mouth nightly.       Start Date: 1/22/2021 End Date: --    IBUPROFEN (ADVIL,MOTRIN) 600 MG TABLET    Take 600 mg by mouth every 6 (six) hours as needed.       Start Date: 12/16/2021End Date: --    MECLIZINE (ANTIVERT) 25 MG TABLET    Take 1 tablet (25 mg total) by mouth 3 (three) times daily as needed.       Start Date: 2/28/2020 End Date: --    MELOXICAM (MOBIC) 7.5 MG TABLET    Take 1 tablet (7.5 mg total) by mouth once daily.       Start Date: 3/11/2022 End Date: --    METOPROLOL TARTRATE (LOPRESSOR) 25 MG TABLET    Take 1 tablet by mouth once daily       Start Date: 2/18/2022 End Date: --    MONTELUKAST (SINGULAIR) 10 MG TABLET    TAKE 1 TABLET BY MOUTH ONCE DAILY IN THE EVENING       Start Date: 3/29/2022 End Date: --    NITROGLYCERIN (NITROSTAT) 0.4 MG SL TABLET    Place 0.4 mg under the tongue.       Start Date:  9/8/2014  End Date: --    OMEPRAZOLE (PRILOSEC) 40 MG CAPSULE    Take 1 capsule (40 mg total) by mouth 2 (two) times daily before meals.       Start Date: 9/17/2021 End Date: --    TRAZODONE (DESYREL) 150 MG TABLET    Take 1 tablet (150 mg total) by mouth every evening.       Start Date: 9/17/2021 End Date: 9/17/2022    TRAZODONE (DESYREL) 150 MG TABLET    Take 1 tablet (150 mg total) by mouth every evening.       Start Date: 4/20/2022 End Date: --       Order(s) placed per written order guidelines:     Please advise.

## 2022-04-22 ENCOUNTER — PATIENT MESSAGE (OUTPATIENT)
Dept: ORTHOPEDICS | Facility: CLINIC | Age: 62
End: 2022-04-22
Payer: COMMERCIAL

## 2022-04-22 RX ORDER — MELOXICAM 7.5 MG/1
7.5 TABLET ORAL DAILY
Qty: 30 TABLET | Refills: 0 | Status: SHIPPED | OUTPATIENT
Start: 2022-04-22 | End: 2022-06-16

## 2022-04-22 RX ORDER — MELOXICAM 7.5 MG/1
7.5 TABLET ORAL DAILY
Qty: 30 TABLET | Refills: 0 | OUTPATIENT
Start: 2022-04-22

## 2022-04-22 RX ORDER — TIZANIDINE 4 MG/1
4 TABLET ORAL EVERY 8 HOURS
Qty: 270 TABLET | Refills: 3 | Status: SHIPPED | OUTPATIENT
Start: 2022-04-22 | End: 2022-06-16

## 2022-05-11 DIAGNOSIS — Z12.31 OTHER SCREENING MAMMOGRAM: ICD-10-CM

## 2022-05-30 ENCOUNTER — PATIENT MESSAGE (OUTPATIENT)
Dept: ADMINISTRATIVE | Facility: HOSPITAL | Age: 62
End: 2022-05-30
Payer: COMMERCIAL

## 2022-06-15 ENCOUNTER — TELEPHONE (OUTPATIENT)
Dept: PAIN MEDICINE | Facility: CLINIC | Age: 62
End: 2022-06-15
Payer: COMMERCIAL

## 2022-06-15 NOTE — TELEPHONE ENCOUNTER
[place] [facility] 10[place]00 am and patient accepted. Gave patient the address of this location, explained where we are located, which building we are in, floor and suite we are in as well. Patient verbalized understanding, nothing further discussed.

## 2022-06-15 NOTE — TELEPHONE ENCOUNTER
----- Message from Naima Nova sent at 6/15/2022  2:54 PM CDT -----  Contact: Patient [phone]  .Name of Caller Patient    Reason for Visit/Symptoms knee & lower back pain  Best Contact Number or Confirm if Mychart Preferred [phone]  Preferred Date/Time of Appointment As soon as she can get in   Interested in Virtual Visit (yes/no)  Additional Information Patient states that she can barely walk. States it's horrible. Tried scheduling appointment. Next available is 08-10-22. Patient is in [place], [place] and will need 4 hrs notice. Please call

## 2022-06-16 ENCOUNTER — OFFICE VISIT (OUTPATIENT)
Dept: PAIN MEDICINE | Facility: CLINIC | Age: 62
End: 2022-06-16
Payer: COMMERCIAL

## 2022-06-16 VITALS
BODY MASS INDEX: 32.79 KG/M2 | HEIGHT: 67 IN | HEART RATE: 70 BPM | WEIGHT: 208.88 LBS | DIASTOLIC BLOOD PRESSURE: 81 MMHG | SYSTOLIC BLOOD PRESSURE: 134 MMHG | OXYGEN SATURATION: 95 %

## 2022-06-16 DIAGNOSIS — M47.816 LUMBAR SPONDYLOSIS: Primary | ICD-10-CM

## 2022-06-16 DIAGNOSIS — M54.16 LUMBAR RADICULOPATHY: ICD-10-CM

## 2022-06-16 DIAGNOSIS — M54.16 LUMBAR RADICULOPATHY, CHRONIC: ICD-10-CM

## 2022-06-16 DIAGNOSIS — M51.36 DDD (DEGENERATIVE DISC DISEASE), LUMBAR: ICD-10-CM

## 2022-06-16 PROCEDURE — 3075F SYST BP GE 130 - 139MM HG: CPT | Mod: CPTII,S$GLB,, | Performed by: PAIN MEDICINE

## 2022-06-16 PROCEDURE — 99999 PR PBB SHADOW E&M-EST. PATIENT-LVL V: CPT | Mod: PBBFAC,,, | Performed by: PAIN MEDICINE

## 2022-06-16 PROCEDURE — 1160F PR REVIEW ALL MEDS BY PRESCRIBER/CLIN PHARMACIST DOCUMENTED: ICD-10-PCS | Mod: CPTII,S$GLB,, | Performed by: PAIN MEDICINE

## 2022-06-16 PROCEDURE — 3008F PR BODY MASS INDEX (BMI) DOCUMENTED: ICD-10-PCS | Mod: CPTII,S$GLB,, | Performed by: PAIN MEDICINE

## 2022-06-16 PROCEDURE — 1159F MED LIST DOCD IN RCRD: CPT | Mod: CPTII,S$GLB,, | Performed by: PAIN MEDICINE

## 2022-06-16 PROCEDURE — 3079F PR MOST RECENT DIASTOLIC BLOOD PRESSURE 80-89 MM HG: ICD-10-PCS | Mod: CPTII,S$GLB,, | Performed by: PAIN MEDICINE

## 2022-06-16 PROCEDURE — 1159F PR MEDICATION LIST DOCUMENTED IN MEDICAL RECORD: ICD-10-PCS | Mod: CPTII,S$GLB,, | Performed by: PAIN MEDICINE

## 2022-06-16 PROCEDURE — 1160F RVW MEDS BY RX/DR IN RCRD: CPT | Mod: CPTII,S$GLB,, | Performed by: PAIN MEDICINE

## 2022-06-16 PROCEDURE — 99214 PR OFFICE/OUTPT VISIT, EST, LEVL IV, 30-39 MIN: ICD-10-PCS | Mod: S$GLB,,, | Performed by: PAIN MEDICINE

## 2022-06-16 PROCEDURE — 3008F BODY MASS INDEX DOCD: CPT | Mod: CPTII,S$GLB,, | Performed by: PAIN MEDICINE

## 2022-06-16 PROCEDURE — 3075F PR MOST RECENT SYSTOLIC BLOOD PRESS GE 130-139MM HG: ICD-10-PCS | Mod: CPTII,S$GLB,, | Performed by: PAIN MEDICINE

## 2022-06-16 PROCEDURE — 99214 OFFICE O/P EST MOD 30 MIN: CPT | Mod: S$GLB,,, | Performed by: PAIN MEDICINE

## 2022-06-16 PROCEDURE — 3079F DIAST BP 80-89 MM HG: CPT | Mod: CPTII,S$GLB,, | Performed by: PAIN MEDICINE

## 2022-06-16 PROCEDURE — 99999 PR PBB SHADOW E&M-EST. PATIENT-LVL V: ICD-10-PCS | Mod: PBBFAC,,, | Performed by: PAIN MEDICINE

## 2022-06-16 RX ORDER — HYDROCHLOROTHIAZIDE 12.5 MG/1
CAPSULE ORAL
COMMUNITY

## 2022-06-16 RX ORDER — TIZANIDINE 4 MG/1
TABLET ORAL
COMMUNITY

## 2022-06-16 RX ORDER — MONTELUKAST SODIUM 10 MG/1
TABLET ORAL
COMMUNITY

## 2022-06-16 RX ORDER — TRAZODONE HYDROCHLORIDE 150 MG/1
TABLET ORAL
COMMUNITY

## 2022-06-16 RX ORDER — METOPROLOL TARTRATE 25 MG/1
TABLET, FILM COATED ORAL
COMMUNITY
End: 2022-08-18

## 2022-06-16 RX ORDER — OMEPRAZOLE 40 MG/1
CAPSULE, DELAYED RELEASE ORAL
COMMUNITY
End: 2022-08-29

## 2022-06-16 RX ORDER — CHOLECALCIFEROL (VITAMIN D3) 25 MCG
TABLET ORAL
COMMUNITY

## 2022-06-16 RX ORDER — DICYCLOMINE HYDROCHLORIDE 20 MG/1
TABLET ORAL
COMMUNITY

## 2022-06-16 RX ORDER — FLUCONAZOLE 150 MG/1
150 TABLET ORAL DAILY
COMMUNITY
Start: 2022-05-13 | End: 2022-06-16

## 2022-06-16 RX ORDER — ATORVASTATIN CALCIUM 20 MG/1
TABLET, FILM COATED ORAL
COMMUNITY
End: 2022-11-02

## 2022-06-16 RX ORDER — MELOXICAM 7.5 MG/1
TABLET ORAL
COMMUNITY
End: 2022-06-28 | Stop reason: SDUPTHER

## 2022-06-16 RX ORDER — GABAPENTIN 300 MG/1
CAPSULE ORAL
COMMUNITY
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
## 2022-06-28 DIAGNOSIS — M47.816 LUMBAR SPONDYLOSIS: Primary | ICD-10-CM

## 2022-06-28 DIAGNOSIS — M54.16 LUMBAR RADICULOPATHY: ICD-10-CM

## 2022-06-28 DIAGNOSIS — M51.36 DDD (DEGENERATIVE DISC DISEASE), LUMBAR: ICD-10-CM

## 2022-06-28 DIAGNOSIS — M54.10 RADICULOPATHY OF LEG: ICD-10-CM

## 2022-06-28 RX ORDER — MELOXICAM 7.5 MG/1
7.5 TABLET ORAL DAILY
Qty: 30 TABLET | Refills: 0 | Status: SHIPPED | OUTPATIENT
Start: 2022-06-28 | End: 2022-07-28

## 2022-07-13 ENCOUNTER — TELEPHONE (OUTPATIENT)
Dept: PAIN MEDICINE | Facility: CLINIC | Age: 62
End: 2022-07-13
Payer: COMMERCIAL

## 2022-07-13 NOTE — TELEPHONE ENCOUNTER
----- Message from Nely Valencia sent at 7/13/2022  2:38 PM CDT -----  .Type: Patient Call Back    Who called: self    What is the request in detail: has the MRI approval    Can the clinic reply by [facility]?no    Would the patient rather a call back or a response via [facility]? call    Best call back number: .[phone] (home)

## 2022-07-13 NOTE — TELEPHONE ENCOUNTER
Spoke with patient about scheduling her MRI. She was made aware that the schedule was limited for openings at this location and asked if she were open to other locations in the immediate area. Patient agreed. Appointment was scheduled at the [facility] location for date/time selected by the patient. A follow up visit with the provider was established. No further issues discussed.

## 2022-07-25 ENCOUNTER — PATIENT MESSAGE (OUTPATIENT)
Dept: ADMINISTRATIVE | Facility: HOSPITAL | Age: 62
End: 2022-07-25
Payer: COMMERCIAL

## 2022-07-28 ENCOUNTER — HOSPITAL ENCOUNTER (OUTPATIENT)
Dept: RADIOLOGY | Facility: OTHER | Age: 62
Discharge: HOME | End: 2022-07-28
Attending: PAIN MEDICINE
Payer: COMMERCIAL

## 2022-07-28 DIAGNOSIS — M54.16 LUMBAR RADICULOPATHY, CHRONIC: ICD-10-CM

## 2022-07-28 DIAGNOSIS — M51.36 DDD (DEGENERATIVE DISC DISEASE), LUMBAR: ICD-10-CM

## 2022-07-28 DIAGNOSIS — M47.816 LUMBAR SPONDYLOSIS: ICD-10-CM

## 2022-07-28 DIAGNOSIS — M54.16 LUMBAR RADICULOPATHY: ICD-10-CM

## 2022-07-28 PROCEDURE — 72148 MRI LUMBAR SPINE WITHOUT CONTRAST: ICD-10-PCS | Mod: 26,,, | Performed by: INTERNAL MEDICINE

## 2022-07-28 PROCEDURE — 72148 MRI LUMBAR SPINE W/O DYE: CPT | Mod: TC

## 2022-07-28 PROCEDURE — 72148 MRI LUMBAR SPINE W/O DYE: CPT | Mod: 26,,, | Performed by: INTERNAL MEDICINE

## 2022-08-02 ENCOUNTER — OFFICE VISIT (OUTPATIENT)
Dept: PAIN MEDICINE | Facility: CLINIC | Age: 62
End: 2022-08-02
Payer: COMMERCIAL

## 2022-08-02 VITALS — HEIGHT: 67 IN | WEIGHT: 215.63 LBS | BODY MASS INDEX: 33.84 KG/M2 | OXYGEN SATURATION: 96 % | HEART RATE: 56 BPM

## 2022-08-02 DIAGNOSIS — M54.16 LUMBAR RADICULOPATHY: ICD-10-CM

## 2022-08-02 DIAGNOSIS — M51.36 DDD (DEGENERATIVE DISC DISEASE), LUMBAR: ICD-10-CM

## 2022-08-02 DIAGNOSIS — M47.816 LUMBAR SPONDYLOSIS: Primary | ICD-10-CM

## 2022-08-02 PROCEDURE — 1160F RVW MEDS BY RX/DR IN RCRD: CPT | Mod: CPTII,S$GLB,, | Performed by: PAIN MEDICINE

## 2022-08-02 PROCEDURE — 1159F PR MEDICATION LIST DOCUMENTED IN MEDICAL RECORD: ICD-10-PCS | Mod: CPTII,S$GLB,, | Performed by: PAIN MEDICINE

## 2022-08-02 PROCEDURE — 99214 PR OFFICE/OUTPT VISIT, EST, LEVL IV, 30-39 MIN: ICD-10-PCS | Mod: S$GLB,,, | Performed by: PAIN MEDICINE

## 2022-08-02 PROCEDURE — 99214 OFFICE O/P EST MOD 30 MIN: CPT | Mod: S$GLB,,, | Performed by: PAIN MEDICINE

## 2022-08-02 PROCEDURE — 3008F PR BODY MASS INDEX (BMI) DOCUMENTED: ICD-10-PCS | Mod: CPTII,S$GLB,, | Performed by: PAIN MEDICINE

## 2022-08-02 PROCEDURE — 1159F MED LIST DOCD IN RCRD: CPT | Mod: CPTII,S$GLB,, | Performed by: PAIN MEDICINE

## 2022-08-02 PROCEDURE — 3008F BODY MASS INDEX DOCD: CPT | Mod: CPTII,S$GLB,, | Performed by: PAIN MEDICINE

## 2022-08-02 PROCEDURE — 99999 PR PBB SHADOW E&M-EST. PATIENT-LVL IV: ICD-10-PCS | Mod: PBBFAC,,, | Performed by: PAIN MEDICINE

## 2022-08-02 PROCEDURE — 1160F PR REVIEW ALL MEDS BY PRESCRIBER/CLIN PHARMACIST DOCUMENTED: ICD-10-PCS | Mod: CPTII,S$GLB,, | Performed by: PAIN MEDICINE

## 2022-08-02 PROCEDURE — 99999 PR PBB SHADOW E&M-EST. PATIENT-LVL IV: CPT | Mod: PBBFAC,,, | Performed by: PAIN MEDICINE

## 2022-08-02 RX ORDER — MELOXICAM 7.5 MG/1
7.5 TABLET ORAL DAILY
COMMUNITY
Start: 2022-07-28

## 2022-08-02 RX ORDER — IBUPROFEN 600 MG/1
600 TABLET ORAL EVERY 6 HOURS PRN
COMMUNITY
Start: 2022-07-01
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
## 2022-08-02 NOTE — PATIENT INSTRUCTIONS
Reviewed pre op instructions with patient:    Please leave jewelry and valuables at home or give them to your escort for safe keeping.  You will be required to have an adult to escort you after the procedure is over. Although we will not be sedating you for your procedure we ask for an escort for safety after the procedure.  Do not take over the counter blood thinning medications beginning 7 days before the procedure (aspirin, Motrin, ibuprofen, Advil, Aleve, naproxen). If you are taking prescribed thinners (Coumadin, warfarin, apixaban, Eliquis, Plavix, clopidogrel, Pletal, etc) we will obtain cardiac clearance from your cardiologist or provider that is monitoring your medications and levels to hold the medications if appropriate.  Cleanse your skin the evening before with an antibacterial soap (Dial or Hibiclens) and then repeat it again the morning of the procedure.  Do not apply lotions, creams, deodorants, perfumes, or colognes the day of the procedure.  You will need to have your COVID testing done on the Monday before the scheduled procedure if you have not been vaccinated.  You will be contacted the day before the procedure to be given the time to arrive the day of the procedure. If you are not contacted by the afternoon before the procedure you should contact [phone].  Please do not eat or drink after midnight before the procedure.    Patient verbalized understanding of the instructions provided to them and clinic contact number was provided for any further questions they may have.

## 2022-08-15 ENCOUNTER — TELEPHONE (OUTPATIENT)
Dept: PAIN MEDICINE | Facility: CLINIC | Age: 62
End: 2022-08-15
Payer: COMMERCIAL

## 2022-08-15 NOTE — TELEPHONE ENCOUNTER
----- Message from Portillo Ibarra sent at 8/15/2022 10:12 AM CDT -----  Regarding: Pt Advice  Contact: PHIL LOUIS [[id number]]  Name of Who is Calling: PHIL LOUIS [[id number]]      What is the request in detail: Would like to speak with staff in regards to knowing if she should go to her PT appointment tomorrow. Please advise      Can the clinic reply by [facility]: no      What Number to Call Back if not [facility]: [phone]

## 2022-08-16 ENCOUNTER — CLINICAL SUPPORT (OUTPATIENT)
Dept: REHABILITATION | Facility: HOSPITAL | Age: 62
End: 2022-08-16
Attending: PAIN MEDICINE
Payer: COMMERCIAL

## 2022-08-16 DIAGNOSIS — G89.29 CHRONIC BILATERAL LOW BACK PAIN WITHOUT SCIATICA: ICD-10-CM

## 2022-08-16 DIAGNOSIS — M54.50 CHRONIC BILATERAL LOW BACK PAIN WITHOUT SCIATICA: ICD-10-CM

## 2022-08-16 DIAGNOSIS — M51.36 DDD (DEGENERATIVE DISC DISEASE), LUMBAR: ICD-10-CM

## 2022-08-16 DIAGNOSIS — M54.16 LUMBAR RADICULOPATHY: ICD-10-CM

## 2022-08-16 DIAGNOSIS — M53.86 DECREASED RANGE OF MOTION OF LUMBAR SPINE: ICD-10-CM

## 2022-08-16 DIAGNOSIS — M62.81 WEAKNESS OF TRUNK MUSCULATURE: ICD-10-CM

## 2022-08-16 DIAGNOSIS — M47.816 LUMBAR SPONDYLOSIS: ICD-10-CM

## 2022-08-16 PROBLEM — R26.9 GAIT DIFFICULTY: Status: RESOLVED | Noted: 2022-03-08 | Resolved: 2022-08-16

## 2022-08-16 PROBLEM — M25.672 DECREASED RANGE OF MOTION OF LEFT ANKLE: Status: RESOLVED | Noted: 2022-03-08 | Resolved: 2022-08-16

## 2022-08-16 PROBLEM — R29.898 ANKLE WEAKNESS: Status: RESOLVED | Noted: 2022-03-08 | Resolved: 2022-08-16

## 2022-08-16 PROBLEM — S93.499A SPRAIN OF ANTERIOR TALOFIBULAR LIGAMENT: Status: RESOLVED | Noted: 2022-03-08 | Resolved: 2022-08-16

## 2022-08-16 PROCEDURE — 97110 THERAPEUTIC EXERCISES: CPT | Mod: PN

## 2022-08-16 PROCEDURE — 97161 PT EVAL LOW COMPLEX 20 MIN: CPT | Mod: PN

## 2022-08-16 NOTE — PLAN OF CARE
[facility]  Physical Therapy Initial Evaluation  Date: 8/16/2022   Name: Alyce Zurita  Clinic Number: [id number]    Therapy Diagnosis:   Encounter Diagnoses   Name Primary?    Lumbar spondylosis     DDD (degenerative disc disease), lumbar     Lumbar radiculopathy     Chronic bilateral low back pain without sciatica     Weakness of trunk musculature     Decreased range of motion of lumbar spine      Physician: Chaitanya Luna Jr*    Physician Orders: PT Eval and Treat   Medical Diagnosis from Referral: M47.816 (ICD-10-CM) - Lumbar spondylosis M51.36 (ICD-10-CM) - DDD (degenerative disc disease), lumbar M54.16 (ICD-10-CM) - Lumbar radiculopathy   Evaluation Date: 8/16/2022  Authorization Period Expiration: 07/06/2023  Plan of Care Expiration: 11-16-22  Visit # / Visits authorized: 1/ 20   Progress Note Due: 9-16-22  FOTO: 1/ 1    Precautions: Standard    Time In: 8:30 am  Time Out: 9:15 am  Total Appointment Time (timed & untimed codes): 45  minutes    Subjective   Date of onset: April 2022.   History of current condition - Alyce reports:that her back went out. She states she had pain radiating towards L LE. Pt states she has MRI and injection on lower back. Pt states after the injection (8-4-22), she felt a lot better. Pt states she has minor issues around her knee. Pt denies any pain. Pt denies pain radiating towards L LE. Pt also complain of L posterior knee pain.      M.D note:   Chief Complaint: Low-back Pain (Review MRI and discuss pain mgmt for LBP / Patient fell this morning, had sharp pain in lower back and from hip all the way down leg on left side is swollen. )        Referred by: No ref. provider found        HPI:     Interval History (8/2/22):  And she returns today for follow up and imaging review.  She reports that continues at left sided low back and lower extremity pain as before.  Pain is now extending all the way to her toes.  Otherwise, she denies any  changes in her symptoms.          Interval History (6/16/22):  She returns today for follow up.  She reports that she continues to have left-sided low back and lower extremity pain.  Pain originates in the left lower lumbar/lumbosacral region radiates his left lower extremity all the way to her lateral ankle.  She reports associated numbness and tingling.  She states that the leg will give out on her at times.  She denies any bowel bladder dysfunction.     LAMBERTO: None  Any Bowel and Bladder movement issues: None  Any falls:  No recently fall   Any dizziness: No  Any Headache:  No  Any injection in lower back: steroid or epidural: yes  Pain radiates:    Pain constant or intermitting: intermittent     Pain:  Current 3/10, worst 8/10, best 3/10   Location: bilateral back   Description: Burning  Aggravating Factors: Sitting, Standing, Walking, Extension, Flexing and Lifting  Easing Factors: hot bath    Prior Therapy: no. Lower back  Social History:  lives with their family  Occupation: No work  Prior Level of Function: Independent   Current Level of Function: Independent     Pts goals: decrease lower back pain     Imaging, MRI studies:   SIGNIFICANT FINDINGS BY LEVEL:     T12-L1: Unremarkable.     L1-2: Disc bulge.  Mild facet arthropathy.  No canal or foraminal stenosis.     L2-3: Disc bulge.  Mild facet arthropathy.  No canal or foraminal stenosis.     L3-4: Disc bulge.  Bilateral facet arthropathy.  No canal or foraminal stenosis.     L4-5: Disc bulge with small central annular fissure.  Bilateral facet arthropathy and ligamentum flavum thickening.  Small bilateral facet joint effusions/synovitis.  No canal stenosis.  Mild bilateral foraminal stenosis.     L5-S1: Unremarkable.       Medical History:   Past Medical History:   Diagnosis Date    Allergy     Anticoagulant long-term use     Anxiety 11/16/2018    Arthritis     Breast injury     left 10 yrs ago/ hit in chest    Carpal tunnel syndrome of left wrist  9/10/2018    GERD (gastroesophageal reflux disease)     Hyperlipemia     Hypertension, uncontrolled 8/17/2018    Interstitial cystitis     Kidney problem     Meningitis     3 months old    Osteoporosis 3/20/2018    Polyp of rectum     PONV (postoperative nausea and vomiting)     pt needs meds for PONV    Tachycardia        Surgical History:   Alyce Zurita  has a past surgical history that includes Appendectomy; Heart Procedure ; Kidney surgery; Hysterectomy (1993); Oophorectomy (Bilateral, 1993); Temporomandibular joint surgery; Cardiac surgery; Left heart catheterization (Left, 2/25/2019); Epidural steroid injection (Left, 1/8/2020); Epidural steroid injection (Left, 1/22/2020); Epidural steroid injection (Left, 2/12/2020); Epidural steroid injection (Bilateral, 8/5/2020); and Transforaminal epidural injection of steroid (Left, 8/11/2022).    Medications:   Alyce has a current medication list which includes the following prescription(s): acetaminophen, aloe vera, aspirin, atorvastatin, dextromethorphan-guaifenesin  mg, dicyclomine, fluoxetine, fluticasone propionate, gabapentin, glucosamine/chondr gray a sod, hydrochlorothiazide, hydroxyzine hcl, ibuprofen, meclizine, meloxicam, metoprolol tartrate, montelukast, nitroglycerin, omeprazole, tizanidine, trazodone, and vitamin d.    Allergies:   Review of patient's allergies indicates:   Allergen Reactions    Other omega-3s Nausea And Vomiting     chlorine    Rofecoxib Nausea And Vomiting and Nausea Only    Nsaids (non-steroidal anti-inflammatory drug) Nausea Only          Objective       Observation:    Posture Alignment: slouched posture;Rounded shoulders     Sensation: intact to light touch    DTR:: intact to light touch    GAIT DEVIATIONS: Alyce displays antalgic gait    Lumbar Range of Motion:    %   Flexion 60 deg with minor pain    Extension 14 deg with pain      Left Side Bending 23 deg with pain   Right Side Bending 20 deg with no pain   Left  rotation   Min loss with pain   Right Rotation   WFL no pain     *= pain    Passive hip ROM in degrees:     Left Right   IR WFL WFL   ER WFL WFL      Lower Extremity Strength    Right LE  Left LE    Hip flexion: 4/5 Hip flexion: 4/5   Knee extension: 4+/5 Knee extension: 4+/5   Knee flexion: 4+/5 Knee flexion: 4+/5   Hip IR: 4/5 Hip IR: 4/5   Hip ER: 4/5 Hip ER: 4/5   Hip extension:  4/5 Hip extension: 4/5   Hip abduction: 4/5 Hip abduction: 4/5   Hip adduction: 4/5 Hip adduction 4/5   Ankle dorsiflexion: 4+/5 Ankle dorsiflexion: 4+/5   Ankle plantarflexion: 4+/5 Ankle plantarflexion: 4+/5     Special Tests:  -Quadrant testing: negative  -GEOVANNI: negative  -Scour: negative  -Repeated Flexion: positive  -Repeated Ext:positive  -Bridge Test: positive  -Heel walking: negative  -Toe walking: negative  -Long sitting test:negative  -Trendelenburg test :negative        Neuro Dynamic Testing:    Sciatic nerve:      SLR: negative    }   Neural Tension:     Slump test: negative      Palpation: increase pain at L2 to L5  Pt has increase posterior L knee pain.     Flexibility:    Ely's test: R = negative ; L = negative    Popliteal Angle: R = lacking 30 deg degrees ; L = lacking 39  degrees      PT Evaluation Completed? Yes  Discussed Plan of Care with patient: Yes        CMS Impairment/Limitation/Restriction for FOTO Lumbar Spine Survey  Status Limitation G-Code CMS Severity Modifier  Intake 47% 53% Current Status CK - At least 40 percent but less than 60 percent  Predicted 60% 40% Goal Status+ CK - At least 40 percent but less than 60 percent  D/C Status CK **only report if this is a one time visit  +Based on FOTO predicted change score    TREATMENT   Treatment Time In: 9:00 am  Treatment Time Out: 9:10 am  Total Treatment time separate from Evaluation: 10 minutes    Alyce received therapeutic exercises to develop strength, endurance, ROM, flexibility, posture and core stabilization for 10 minutes including:  LTR  Pelvic  tilt  Hamstring stretch     Home Exercises and Patient Education Provided    Education provided:   - Perform HEP 2 times per day    Written Home Exercises Provided: Patient instructed to cont prior HEP.  Exercises were reviewed and Alyce was able to demonstrate them prior to the end of the session.  Alyce demonstrated good  understanding of the education provided.     See EMR under Patient Instructions for exercises provided 8/16/2022.    Assessment   Alyce is a 62 y.o. female referred to outpatient Physical Therapy with a medical diagnosis of Lumbar spondylosis. Pt presents to therapy with chronic lower back pain. Pt ambulated with slouched posture and rounded shoulders. Pt was negative for SLR and slump test today indicating no sciatica involvement. Pt recently had lower back injection with good results. Pt has decrease lower back AROM, decrease hip muscles strength, and decrease trunk extensors muscles strength. During palpation, increase pain at L2 to L5. Pt also demonstrated increase pain at posterior L knee. Pt will benefit from skilled PT services to decrease pain to return to PLOF.     Pt prognosis is Good.   Pt will benefit from skilled outpatient Physical Therapy to address the deficits stated above and in the chart below, provide pt/family education, and to maximize pt's level of independence.     Plan of care discussed with patient: Yes  Pt's spiritual, cultural and educational needs considered and patient is agreeable to the plan of care and goals as stated below:     Anticipated Barriers for therapy: None     Medical Necessity is demonstrated by the following  History  Co-morbidities and personal factors that may impact the plan of care Co-morbidities:   Allergy   Anticoagulant long-term use   Anxiety   Arthritis   Breast injury   left 10 yrs ago/ hit in chest   Carpal tunnel syndrome of left wrist   GERD (gastroesophageal reflux disease)   Hyperlipemia   Hypertension, uncontrolled   Interstitial  cystitis   Kidney problem   Meningitis   3 months old   Osteoporosis   Polyp of rectum   PONV (postoperative nausea and vomiting)   pt needs meds for PONV   Tachycardia       Personal Factors:   no deficits     low   Examination  Body Structures and Functions, activity limitations and participation restrictions that may impact the plan of care Body Regions:   back    Body Systems:    ROM  strength  balance  gait  transfers  transitions  motor control  motor learning    Participation Restrictions:   Community activities.     Activity limitations:   Learning and applying knowledge  no deficits    General Tasks and Commands  no deficits    Communication  no deficits    Mobility  walking  moving around using equipment (WC)  using transportation (bus, train, plane, car)  driving (bike, car, motorcycle)    Self care  no deficits    Domestic Life  shopping  cooking  doing house work (cleaning house, washing dishes, laundry)    Interactions/Relationships  no deficits    Life Areas  no deficits    Community and Social Life  community life         Complexity: low  See FOTO outcome assessment   Clinical Presentation stable and uncomplicated low   Decision Making/ Complexity Score: low     GOALS: Short Term Goals:  4 weeks  1. Report decreased in pain at worse less than  <   / =  5  /10  to increase tolerance for functional activities.On going  2. Pt to increase B popliteal angle by greater than > or = 5 degrees in order to improve flexibility and posture. On going  3. Increased B hip MMT 1/2  to increase tolerance for ADL and work activities.On going  4. Pt to tolerate HEP to improve ROM and independence with ADL's.On going  5. Pt to improve range of motion by 25% to allow for improved functional mobility to allow for improvement in IADLs. On going  6. Pt report decrease L posterior knee pain by 50% to improve functional mobility    Long Term Goals: 8 weeks  1. Report decreased in pain at worse less than  <   / =  2  /10  to  increase tolerance for functional mobility.  On going  2 .Pt to increase B popliteal angle by greater than > or = 10 degrees in order to improve flexibility and posture. On going  3. Increased B hip MMT 1 grade to increase tolerance for ADL and work activities.On going  4. Pt will report at 40% or less limitation on FOTO Lumbar spine survey  to demonstrate decrease in disability and improvement in back pain.On going  5. Pt to be Independent with HEP to improve ROM and independence with ADL's. On going  6. Pt to increase B Ely's Test by greater than > or = 10 degrees in order to improve flexibility and posture. On going  7. Pt to demonstrate negative Bridge Test in order to show improved core strength for lumbar stabilization. On going  8. Pt to improve range of motion by 75% to allow for improved functional mobility to allow for improvement in IADLs. On going  9.Pt report decrease L posterior knee pain by 80% to improve functional mobility    Plan   Plan of care Certification: 8/16/2022 to 11-16-22    Outpatient Physical Therapy 2 times weekly for 12 weeks to include the following interventions: Cervical/Lumbar Traction, Gait Training, Manual Therapy, Moist Heat/ Ice, Neuromuscular Re-ed, Patient Education, Therapeutic Activities and Therapeutic Exercise. Dry needdemetra Delacruz, PT      I CERTIFY THE NEED FOR THESE SERVICES FURNISHED UNDER THIS PLAN OF TREATMENT AND WHILE UNDER MY CARE   Physician's comments:     Physician's Signature: ___________________________________________________

## 2022-08-23 ENCOUNTER — HOSPITAL ENCOUNTER (OUTPATIENT)
Dept: RADIOLOGY | Facility: HOSPITAL | Age: 62
Discharge: HOME | End: 2022-08-23
Attending: NURSE PRACTITIONER
Payer: COMMERCIAL

## 2022-08-23 ENCOUNTER — OFFICE VISIT (OUTPATIENT)
Dept: PAIN MEDICINE | Facility: CLINIC | Age: 62
End: 2022-08-23
Payer: COMMERCIAL

## 2022-08-23 ENCOUNTER — OFFICE VISIT (OUTPATIENT)
Dept: CARDIOLOGY | Facility: CLINIC | Age: 62
End: 2022-08-23
Payer: COMMERCIAL

## 2022-08-23 ENCOUNTER — DOCUMENTATION ONLY (OUTPATIENT)
Dept: PAIN MEDICINE | Facility: CLINIC | Age: 62
End: 2022-08-23

## 2022-08-23 VITALS
HEART RATE: 73 BPM | DIASTOLIC BLOOD PRESSURE: 70 MMHG | BODY MASS INDEX: 33.69 KG/M2 | HEIGHT: 67 IN | WEIGHT: 214.63 LBS | RESPIRATION RATE: 18 BRPM | OXYGEN SATURATION: 94 % | SYSTOLIC BLOOD PRESSURE: 130 MMHG

## 2022-08-23 VITALS — HEART RATE: 73 BPM | DIASTOLIC BLOOD PRESSURE: 77 MMHG | SYSTOLIC BLOOD PRESSURE: 141 MMHG | OXYGEN SATURATION: 95 %

## 2022-08-23 DIAGNOSIS — M25.562 CHRONIC PAIN OF LEFT KNEE: Primary | ICD-10-CM

## 2022-08-23 DIAGNOSIS — R94.39 ABNORMAL NUCLEAR STRESS TEST: ICD-10-CM

## 2022-08-23 DIAGNOSIS — M25.562 CHRONIC PAIN OF LEFT KNEE: ICD-10-CM

## 2022-08-23 DIAGNOSIS — R06.09 DOE (DYSPNEA ON EXERTION): ICD-10-CM

## 2022-08-23 DIAGNOSIS — I25.10 CORONARY ARTERY DISEASE INVOLVING NATIVE CORONARY ARTERY OF NATIVE HEART WITHOUT ANGINA PECTORIS: Primary | ICD-10-CM

## 2022-08-23 DIAGNOSIS — M47.816 LUMBAR ARTHROPATHY: ICD-10-CM

## 2022-08-23 DIAGNOSIS — G89.29 CHRONIC PAIN OF LEFT KNEE: Primary | ICD-10-CM

## 2022-08-23 DIAGNOSIS — R07.89 CHEST PAIN, ATYPICAL: ICD-10-CM

## 2022-08-23 DIAGNOSIS — G89.29 CHRONIC PAIN OF LEFT KNEE: ICD-10-CM

## 2022-08-23 DIAGNOSIS — R00.2 HEART PALPITATIONS: ICD-10-CM

## 2022-08-23 PROCEDURE — 3078F DIAST BP <80 MM HG: CPT | Mod: CPTII,S$GLB,, | Performed by: NURSE PRACTITIONER

## 2022-08-23 PROCEDURE — 99214 OFFICE O/P EST MOD 30 MIN: CPT | Mod: S$GLB,,, | Performed by: INTERNAL MEDICINE

## 2022-08-23 PROCEDURE — 73562 X-RAY EXAM OF KNEE 3: CPT | Mod: TC,FY,LT

## 2022-08-23 PROCEDURE — 73562 XR KNEE 3 VIEW LEFT: ICD-10-PCS | Mod: 26,LT,, | Performed by: RADIOLOGY

## 2022-08-23 PROCEDURE — 99999 PR PBB SHADOW E&M-EST. PATIENT-LVL V: ICD-10-PCS | Mod: PBBFAC,,, | Performed by: INTERNAL MEDICINE

## 2022-08-23 PROCEDURE — 3078F PR MOST RECENT DIASTOLIC BLOOD PRESSURE < 80 MM HG: ICD-10-PCS | Mod: CPTII,S$GLB,, | Performed by: INTERNAL MEDICINE

## 2022-08-23 PROCEDURE — 20610 PR DRAIN/INJECT LARGE JOINT/BURSA: ICD-10-PCS | Mod: LT,S$GLB,, | Performed by: NURSE PRACTITIONER

## 2022-08-23 PROCEDURE — 1160F RVW MEDS BY RX/DR IN RCRD: CPT | Mod: CPTII,S$GLB,, | Performed by: NURSE PRACTITIONER

## 2022-08-23 PROCEDURE — 3008F PR BODY MASS INDEX (BMI) DOCUMENTED: ICD-10-PCS | Mod: CPTII,S$GLB,, | Performed by: INTERNAL MEDICINE

## 2022-08-23 PROCEDURE — 20610 DRAIN/INJ JOINT/BURSA W/O US: CPT | Mod: LT,S$GLB,, | Performed by: NURSE PRACTITIONER

## 2022-08-23 PROCEDURE — 93000 ELECTROCARDIOGRAM COMPLETE: CPT | Mod: S$GLB,,, | Performed by: INTERNAL MEDICINE

## 2022-08-23 PROCEDURE — 1159F MED LIST DOCD IN RCRD: CPT | Mod: CPTII,S$GLB,, | Performed by: NURSE PRACTITIONER

## 2022-08-23 PROCEDURE — 3077F SYST BP >= 140 MM HG: CPT | Mod: CPTII,S$GLB,, | Performed by: NURSE PRACTITIONER

## 2022-08-23 PROCEDURE — 73562 X-RAY EXAM OF KNEE 3: CPT | Mod: 26,LT,, | Performed by: RADIOLOGY

## 2022-08-23 PROCEDURE — 3075F PR MOST RECENT SYSTOLIC BLOOD PRESS GE 130-139MM HG: ICD-10-PCS | Mod: CPTII,S$GLB,, | Performed by: INTERNAL MEDICINE

## 2022-08-23 PROCEDURE — 1159F PR MEDICATION LIST DOCUMENTED IN MEDICAL RECORD: ICD-10-PCS | Mod: CPTII,S$GLB,, | Performed by: NURSE PRACTITIONER

## 2022-08-23 PROCEDURE — 3077F PR MOST RECENT SYSTOLIC BLOOD PRESSURE >= 140 MM HG: ICD-10-PCS | Mod: CPTII,S$GLB,, | Performed by: NURSE PRACTITIONER

## 2022-08-23 PROCEDURE — 3075F SYST BP GE 130 - 139MM HG: CPT | Mod: CPTII,S$GLB,, | Performed by: INTERNAL MEDICINE

## 2022-08-23 PROCEDURE — 99214 PR OFFICE/OUTPT VISIT, EST, LEVL IV, 30-39 MIN: ICD-10-PCS | Mod: 25,S$GLB,, | Performed by: NURSE PRACTITIONER

## 2022-08-23 PROCEDURE — 99214 PR OFFICE/OUTPT VISIT, EST, LEVL IV, 30-39 MIN: ICD-10-PCS | Mod: S$GLB,,, | Performed by: INTERNAL MEDICINE

## 2022-08-23 PROCEDURE — 1159F PR MEDICATION LIST DOCUMENTED IN MEDICAL RECORD: ICD-10-PCS | Mod: CPTII,S$GLB,, | Performed by: INTERNAL MEDICINE

## 2022-08-23 PROCEDURE — 3008F BODY MASS INDEX DOCD: CPT | Mod: CPTII,S$GLB,, | Performed by: INTERNAL MEDICINE

## 2022-08-23 PROCEDURE — 3078F PR MOST RECENT DIASTOLIC BLOOD PRESSURE < 80 MM HG: ICD-10-PCS | Mod: CPTII,S$GLB,, | Performed by: NURSE PRACTITIONER

## 2022-08-23 PROCEDURE — 1160F PR REVIEW ALL MEDS BY PRESCRIBER/CLIN PHARMACIST DOCUMENTED: ICD-10-PCS | Mod: CPTII,S$GLB,, | Performed by: NURSE PRACTITIONER

## 2022-08-23 PROCEDURE — 99999 PR PBB SHADOW E&M-EST. PATIENT-LVL V: CPT | Mod: PBBFAC,,, | Performed by: NURSE PRACTITIONER

## 2022-08-23 PROCEDURE — 99999 PR PBB SHADOW E&M-EST. PATIENT-LVL V: CPT | Mod: PBBFAC,,, | Performed by: INTERNAL MEDICINE

## 2022-08-23 PROCEDURE — 3078F DIAST BP <80 MM HG: CPT | Mod: CPTII,S$GLB,, | Performed by: INTERNAL MEDICINE

## 2022-08-23 PROCEDURE — 99214 OFFICE O/P EST MOD 30 MIN: CPT | Mod: 25,S$GLB,, | Performed by: NURSE PRACTITIONER

## 2022-08-23 PROCEDURE — 1159F MED LIST DOCD IN RCRD: CPT | Mod: CPTII,S$GLB,, | Performed by: INTERNAL MEDICINE

## 2022-08-23 PROCEDURE — 99999 PR PBB SHADOW E&M-EST. PATIENT-LVL V: ICD-10-PCS | Mod: PBBFAC,,, | Performed by: NURSE PRACTITIONER

## 2022-08-23 PROCEDURE — 93000 EKG 12-LEAD: ICD-10-PCS | Mod: S$GLB,,, | Performed by: INTERNAL MEDICINE

## 2022-08-23 RX ORDER — BUPIVACAINE HYDROCHLORIDE 5 MG/ML
2 INJECTION, SOLUTION EPIDURAL; INTRACAUDAL
Status: COMPLETED | OUTPATIENT
Start: 2022-08-23 | End: 2022-08-23

## 2022-08-23 RX ORDER — TRIAMCINOLONE ACETONIDE 40 MG/ML
40 INJECTION, SUSPENSION INTRA-ARTICULAR; INTRAMUSCULAR
Status: COMPLETED | OUTPATIENT
Start: 2022-08-23 | End: 2022-08-23

## 2022-08-23 RX ADMIN — BUPIVACAINE HYDROCHLORIDE 10 MG: 5 INJECTION, SOLUTION EPIDURAL; INTRACAUDAL at 08:08

## 2022-08-23 RX ADMIN — TRIAMCINOLONE ACETONIDE 40 MG: 40 INJECTION, SUSPENSION INTRA-ARTICULAR; INTRAMUSCULAR at 08:08

## 2022-08-23 NOTE — PROGRESS NOTES
Subjective:    Patient ID:  Alyce Zurita is a 62 y.o. female who presents for follow-up of No chief complaint on file.      HPI      is my patient    Previously saw Dr Angela  # MOORE/CP, todays description of throat tightness more concerning for angina.  MPI 2/2019 with anterior ischemia.  Cath 2/2019 with essentially normal cors.  # abnl EKG  # HTN, uncontrolled  # palps, Holter 2/2019 neg.  # hx AVNRT s/p ablation  2/1/17 ([facility])  # BMI 34, up 1 unit vs last OV    Cath 2/25/19  LVEDP: 9mmHg  LVEF: 65% with normal wall motion by echo  Dominance: Right  LM: normal  LAD: normal, mid systolic bridge, med caliber vessel  Ramus: normal  LCx: normal  RCA: normal, dom  Hemostasis:  R Radial band  Impression:  CP with abnl MPI  Essentially normal cors    Holter 2/11/19  Sinus rhythm with occ PACs and PVCs.  Diary not returned.     Ex MPI 2/1/19   There is a mild, ischemia defect(s) in the anteroapical wall(s),  An ejection fraction of 67 % at rest  The EKG portion of this study is positive for myocardial ischemia. (Fer 7:39, 9 METS, 92% MPHR, -CP, +EKG (1mm downsloping ST depression))     Echo 2/1/19  Normal left ventricular systolic function. The estimated ejection fraction is 65%  Concentric left ventricular hypertrophy.  Normal LV diastolic function.  Normal right ventricular systolic function.  Mild pulmonic regurgitation.     8/23/22 Reports recent worsening MOORE with occasional chest tightness  EKG NSR old IMI PRWP - similar to previous  BP controlled    Review of Systems   Constitutional: Negative for decreased appetite.   HENT: Negative for ear discharge.    Eyes: Negative for blurred vision.   Respiratory: Negative for hemoptysis.    Endocrine: Negative for polyphagia.   Hematologic/Lymphatic: Negative for adenopathy.   Skin: Negative for color change.   Musculoskeletal: Negative for joint swelling.   Genitourinary: Negative for bladder incontinence.   Neurological: Negative for brief paralysis.    Psychiatric/Behavioral: Negative for hallucinations.   Allergic/Immunologic: Negative for hives.        Objective:    Physical Exam  Constitutional:       Appearance: She is well-developed.   HENT:      Head: Normocephalic and atraumatic.   Eyes:      Conjunctiva/sclera: Conjunctivae normal.      Pupils: Pupils are equal, round, and reactive to light.   Cardiovascular:      Rate and Rhythm: Normal rate.      Pulses: Intact distal pulses.      Heart sounds: Normal heart sounds.   Pulmonary:      Effort: Pulmonary effort is normal.      Breath sounds: Normal breath sounds.   Abdominal:      General: Bowel sounds are normal.      Palpations: Abdomen is soft.   Musculoskeletal:         General: Normal range of motion.      Cervical back: Normal range of motion and neck supple.   Skin:     General: Skin is warm and dry.   Neurological:      Mental Status: She is alert and oriented to person, place, and time.           Assessment:       1. Coronary artery disease involving native coronary artery of native heart without angina pectoris    2. Heart palpitations    3. Abnormal nuclear stress test    4. MOORE (dyspnea on exertion)    5. Chest pain, atypical         Plan:       Echo and PET stress for CP and MOORE - cannot walk treadmill with knee and back issues  Continue Rx for HTN, HLD, CAD
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
## 2022-08-24 ENCOUNTER — PATIENT MESSAGE (OUTPATIENT)
Dept: FAMILY MEDICINE | Facility: CLINIC | Age: 62
End: 2022-08-24
Payer: COMMERCIAL

## 2022-09-19 ENCOUNTER — TELEPHONE (OUTPATIENT)
Dept: CARDIOLOGY | Facility: HOSPITAL | Age: 62
End: 2022-09-19
Payer: COMMERCIAL

## 2022-09-21 ENCOUNTER — HOSPITAL ENCOUNTER (OUTPATIENT)
Dept: CARDIOLOGY | Facility: HOSPITAL | Age: 62
Discharge: HOME | End: 2022-09-21
Attending: INTERNAL MEDICINE
Payer: COMMERCIAL

## 2022-09-21 DIAGNOSIS — R07.89 CHEST PAIN, ATYPICAL: ICD-10-CM

## 2022-09-21 DIAGNOSIS — R06.09 DOE (DYSPNEA ON EXERTION): ICD-10-CM

## 2022-09-21 DIAGNOSIS — R94.39 ABNORMAL NUCLEAR STRESS TEST: ICD-10-CM

## 2022-09-21 DIAGNOSIS — R00.2 HEART PALPITATIONS: ICD-10-CM

## 2022-09-21 DIAGNOSIS — I25.10 CORONARY ARTERY DISEASE INVOLVING NATIVE CORONARY ARTERY OF NATIVE HEART WITHOUT ANGINA PECTORIS: ICD-10-CM

## 2022-09-21 LAB
ASCENDING AORTA: 3.53 CM
AV INDEX (PROSTH): 0.76
AV MEAN GRADIENT: 4 MMHG
AV PEAK GRADIENT: 6 MMHG
AV VALVE AREA: 2.66 CM2
AV VELOCITY RATIO: 0.75
CV ECHO LV RWT: 0.52 CM
DOP CALC AO PEAK VEL: 1.24 M/S
DOP CALC AO VTI: 30.3 CM
DOP CALC LVOT AREA: 3.5 CM2
DOP CALC LVOT DIAMETER: 2.11 CM
DOP CALC LVOT PEAK VEL: 0.93 M/S
DOP CALC LVOT STROKE VOLUME: 80.73 CM3
DOP CALCLVOT PEAK VEL VTI: 23.1 CM
E WAVE DECELERATION TIME: 211.78 MSEC
E/A RATIO: 1.05
E/E' RATIO: 12 M/S
ECHO LV POSTERIOR WALL: 1.23 CM (ref 0.6–1.1)
EJECTION FRACTION: 55 %
FRACTIONAL SHORTENING: 30 % (ref 28–44)
INTERVENTRICULAR SEPTUM: 1.33 CM (ref 0.6–1.1)
IVC DIAMETER: 1.51 CM
IVRT: 111.32 MSEC
LA MAJOR: 5.13 CM
LA MINOR: 5.42 CM
LA WIDTH: 3.9 CM
LEFT ATRIUM SIZE: 4.08 CM
LEFT ATRIUM VOLUME: 71.29 CM3
LEFT INTERNAL DIMENSION IN SYSTOLE: 3.29 CM (ref 2.1–4)
LEFT VENTRICLE DIASTOLIC VOLUME: 102.52 ML
LEFT VENTRICLE SYSTOLIC VOLUME: 43.81 ML
LEFT VENTRICULAR INTERNAL DIMENSION IN DIASTOLE: 4.7 CM (ref 3.5–6)
LEFT VENTRICULAR MASS: 232.59 G
LV LATERAL E/E' RATIO: 9.75 M/S
LV SEPTAL E/E' RATIO: 15.6 M/S
LVOT MG: 2.28 MMHG
LVOT MV: 0.72 CM/S
MV PEAK A VEL: 0.74 M/S
MV PEAK E VEL: 0.78 M/S
MV STENOSIS PRESSURE HALF TIME: 61.42 MS
MV VALVE AREA P 1/2 METHOD: 3.58 CM2
PISA TR MAX VEL: 2.02 M/S
PULM VEIN S/D RATIO: 1.36
PV PEAK D VEL: 0.42 M/S
PV PEAK S VEL: 0.57 M/S
PV PEAK VELOCITY: 0.86 CM/S
RA MAJOR: 4.65 CM
RA PRESSURE: 3 MMHG
RIGHT VENTRICULAR END-DIASTOLIC DIMENSION: 3.07 CM
RV TISSUE DOPPLER FREE WALL SYSTOLIC VELOCITY 1 (APICAL 4 CHAMBER VIEW): 0.01 CM/S
SINUS: 3.65 CM
STJ: 2.79 CM
TDI LATERAL: 0.08 M/S
TDI SEPTAL: 0.05 M/S
TDI: 0.07 M/S
TR MAX PG: 16 MMHG
TRICUSPID ANNULAR PLANE SYSTOLIC EXCURSION: 1.98 CM
TV PEAK GRADIENT: 1.65 MMHG
TV REST PULMONARY ARTERY PRESSURE: 19 MMHG

## 2022-09-21 PROCEDURE — 93306 ECHO (CUPID ONLY): ICD-10-PCS | Mod: 26,,, | Performed by: INTERNAL MEDICINE

## 2022-09-21 PROCEDURE — 93306 TTE W/DOPPLER COMPLETE: CPT

## 2022-09-21 PROCEDURE — 93306 TTE W/DOPPLER COMPLETE: CPT | Mod: 26,,, | Performed by: INTERNAL MEDICINE

## 2022-09-23 ENCOUNTER — DOCUMENTATION ONLY (OUTPATIENT)
Dept: PAIN MEDICINE | Facility: CLINIC | Age: 62
End: 2022-09-23
Payer: COMMERCIAL

## 2022-09-23 DIAGNOSIS — M17.0 PRIMARY OSTEOARTHRITIS OF BOTH KNEES: Primary | ICD-10-CM

## 2022-09-23 NOTE — PROGRESS NOTES
Spoke with pt and she states PT isn't helping her knee or back pain. she said she hasn't completed PT she only completed 2 sessions of PT and she needs to if she needs to a specialist.   Lucy Tavares Staff  Caller: Unspecified (Today,  9:36 AM)  Type: Patient Call Back     Who called: self     What is the request in detail: Requesting to speak with someone regarding on going back and knee pain. States she's been to physical therapy and doesn't feel it's any better, would claudia to know if she needs to see a specialist.     Can the clinic reply by [facility]? No     Would the patient rather a call back or a response via [facility]? Call back     Best call back number: [phone]

## 2022-09-29 ENCOUNTER — OFFICE VISIT (OUTPATIENT)
Dept: ORTHOPEDICS | Facility: CLINIC | Age: 62
End: 2022-09-29
Payer: COMMERCIAL

## 2022-09-29 VITALS
SYSTOLIC BLOOD PRESSURE: 139 MMHG | OXYGEN SATURATION: 99 % | HEIGHT: 67 IN | DIASTOLIC BLOOD PRESSURE: 82 MMHG | WEIGHT: 214 LBS | HEART RATE: 79 BPM | BODY MASS INDEX: 33.59 KG/M2 | RESPIRATION RATE: 18 BRPM

## 2022-09-29 DIAGNOSIS — M70.50 PES ANSERINE BURSITIS: ICD-10-CM

## 2022-09-29 DIAGNOSIS — M17.0 PRIMARY OSTEOARTHRITIS OF BOTH KNEES: ICD-10-CM

## 2022-09-29 DIAGNOSIS — M60.9 MYOSITIS OF LEFT LOWER EXTREMITY, UNSPECIFIED MYOSITIS TYPE: Primary | ICD-10-CM

## 2022-09-29 PROCEDURE — 1159F MED LIST DOCD IN RCRD: CPT | Mod: CPTII,S$GLB,, | Performed by: ORTHOPAEDIC SURGERY

## 2022-09-29 PROCEDURE — 99999 PR PBB SHADOW E&M-EST. PATIENT-LVL V: ICD-10-PCS | Mod: PBBFAC,,, | Performed by: ORTHOPAEDIC SURGERY

## 2022-09-29 PROCEDURE — 99999 PR PBB SHADOW E&M-EST. PATIENT-LVL V: CPT | Mod: PBBFAC,,, | Performed by: ORTHOPAEDIC SURGERY

## 2022-09-29 PROCEDURE — 3008F BODY MASS INDEX DOCD: CPT | Mod: CPTII,S$GLB,, | Performed by: ORTHOPAEDIC SURGERY

## 2022-09-29 PROCEDURE — 3079F PR MOST RECENT DIASTOLIC BLOOD PRESSURE 80-89 MM HG: ICD-10-PCS | Mod: CPTII,S$GLB,, | Performed by: ORTHOPAEDIC SURGERY

## 2022-09-29 PROCEDURE — 1159F PR MEDICATION LIST DOCUMENTED IN MEDICAL RECORD: ICD-10-PCS | Mod: CPTII,S$GLB,, | Performed by: ORTHOPAEDIC SURGERY

## 2022-09-29 PROCEDURE — 3008F PR BODY MASS INDEX (BMI) DOCUMENTED: ICD-10-PCS | Mod: CPTII,S$GLB,, | Performed by: ORTHOPAEDIC SURGERY

## 2022-09-29 PROCEDURE — 99213 OFFICE O/P EST LOW 20 MIN: CPT | Mod: S$GLB,,, | Performed by: ORTHOPAEDIC SURGERY

## 2022-09-29 PROCEDURE — 3075F SYST BP GE 130 - 139MM HG: CPT | Mod: CPTII,S$GLB,, | Performed by: ORTHOPAEDIC SURGERY

## 2022-09-29 PROCEDURE — 3079F DIAST BP 80-89 MM HG: CPT | Mod: CPTII,S$GLB,, | Performed by: ORTHOPAEDIC SURGERY

## 2022-09-29 PROCEDURE — 3075F PR MOST RECENT SYSTOLIC BLOOD PRESS GE 130-139MM HG: ICD-10-PCS | Mod: CPTII,S$GLB,, | Performed by: ORTHOPAEDIC SURGERY

## 2022-09-29 PROCEDURE — 99213 PR OFFICE/OUTPT VISIT, EST, LEVL III, 20-29 MIN: ICD-10-PCS | Mod: S$GLB,,, | Performed by: ORTHOPAEDIC SURGERY

## 2022-09-29 RX ORDER — METHYLPREDNISOLONE 4 MG/1
TABLET ORAL
Qty: 1 EACH | Refills: 0 | Status: SHIPPED | OUTPATIENT
Start: 2022-09-29

## 2022-09-29 NOTE — PROGRESS NOTES
NEW PATIENT ORTHOPAEDIC: Knee    PRIMARY CARE PHYSICIAN: Toshia Cintron MD   REFERRING PROVIDER: Chaitanya Luna Jr., MD  [address] [facility] [address]  [address]  [place] [address]     ASSESSMENT & PLAN:    Impression:  Left Knee Mild Osteoarthritis, Primary    Left Knee Pes Bursitis  Left Knee Myositis    Follow Up Plan: 1 month     Non operative care:    Alyce Zurita has physical exam evidence of above and wishes to pursue an non-operative care. I am recommending the following:  Patient comes in with a greater than 6 month history of left-sided knee pain.  She has seen other orthopedic in pain providers for both lumbar and knee related pain.  With regard to her knee she received intra-articular injection of steroid which did not provide significant relief to her.  Her pain is relatively diffuse she has tenderness down the anterior medial aspect of her tibia at the pes bursa at her medial joint line and extends proximally into her VMO and quadriceps muscle.  She does report some mechanical symptoms such as popping which are painful to her.  Radiographs show some loose bodies as well as mild arthritis.  At this time it is hard to pinpoint this specific etiology or maximal point tenderness of her pain.  As a result I would like to try with something more general like a Medrol Dosepak to see if I can get some of the inflammation down in terms of her myositis or her pes bursa that may suggest more of intra-articular pathology.  I could consider MRI of her knee but she is not can on surgical intervention.  She does not tolerate anti-inflammatories by mouth well.  She is involved in physical therapy.  See her back in a month and see how the Medrol Dosepak helped.  This may continue to be an ongoing process of elimination and could consider pes bursa injection versus Visco supplement injections depending on next clinical check.    The patient has been ordered:  Pain Prescription    CONSULTS:   None    ACTIVE  PROBLEM LIST  Patient Active Problem List   Diagnosis    Chronic neck pain    Primary insomnia    Obesity (BMI 30.0-34.9)    Carpal tunnel syndrome of left wrist    Wrist pain, chronic, left    Anxiety    Coronary artery disease involving native coronary artery of native heart without angina pectoris    Abnormal nuclear stress test    Chronic sinusitis    Cervical spondylosis    DDD (degenerative disc disease), cervical    Myofascial pain    Chronic pain of both knees    Right hand pain    Radiculopathy of leg    Dorsalgia, unspecified    Lumbar herniated disc    DDD (degenerative disc disease), lumbar    Sacroiliac joint pain    Facet arthropathy, lumbar    Foot pain, right    Primary osteoarthritis of both knees    Lumbar spondylosis    Unstable angina    Reflux    Polyp of colon    Overactive bladder    Osteoporosis    Internal hemorrhoids    Heart palpitations    GERD (gastroesophageal reflux disease)    Diverticular disease of colon    Atrophic vaginitis    Need for influenza vaccination    Change in mole    Lumbar radiculopathy    Chronic bilateral low back pain without sciatica    Weakness of trunk musculature    Decreased range of motion of lumbar spine    MOORE (dyspnea on exertion)    Chest pain, atypical           SUBJECTIVE    CHIEF COMPLAINT: Knee Pain    HPI:   Alyce Zurita is a 62 y.o. female here for evaluation and management of left knee pain. There is not a specific incident that brought about this pain. she has had progressive problems with the knee(s) starting 1 years ago but is now progressing to interfere with activities which include: walking, functional household ADL's, participating in family activities, and enjoying hobbies    Currently the pain in the joint is rated at moderate with activity. The pain is constant and is located in the knee, at level of joint line, located medially, located laterally, located anterior, and located posterior. The pain is described as aching, burning,  radiating, severe, stiffness, and throbbing. Relieving factors include rest, ice or heat, over the counter medication , and repositioning.     There is associated Catching.     Alyce Zurita has no additional complaints.     PROGRESSIVE SYMPTOMS:  Pain impacting work  Pain worsened by weight bearing  Pain effecting living situation    FUNCTIONAL STATUS:   Climb a flight of stairs or walk up a hill     PREVIOUS TREATMENTS:  Medical: Intolerant of NSAIDS and Steroid Injections  Physical Therapy: Activities Modified   Previous Orthopaedic Surgery: None    REVIEW OF SYSTEMS:  PAIN ASSESSMENT:  See HPI.  MUSCULOSKELETAL: See HPI.  OTHER 10 point review of systems is negative except as stated in HPI above    PAST MEDICAL HISTORY   has a past medical history of Allergy, Anticoagulant long-term use, Anxiety (11/16/2018), Arthritis, Breast injury, Carpal tunnel syndrome of left wrist (9/10/2018), GERD (gastroesophageal reflux disease), Hyperlipemia, Hypertension, uncontrolled (8/17/2018), Interstitial cystitis, Kidney problem, Meningitis, Osteoporosis (3/20/2018), Polyp of rectum, PONV (postoperative nausea and vomiting), and Tachycardia.     PAST SURGICAL HISTORY   has a past surgical history that includes Appendectomy; Heart Procedure ; Kidney surgery; Hysterectomy (1993); Oophorectomy (Bilateral, 1993); Temporomandibular joint surgery; Cardiac surgery; Left heart catheterization (Left, 2/25/2019); Epidural steroid injection (Left, 1/8/2020); Epidural steroid injection (Left, 1/22/2020); Epidural steroid injection (Left, 2/12/2020); Epidural steroid injection (Bilateral, 8/5/2020); and Transforaminal epidural injection of steroid (Left, 8/11/2022).     FAMILY HISTORY  family history includes Arthritis in her mother; Asthma in her daughter; Breast cancer in her maternal aunt, maternal aunt, maternal grandmother, mother, and paternal grandmother; Glaucoma in her mother; Melanoma in her father.     SOCIAL HISTORY   reports  that she has never smoked. She has never used smokeless tobacco. She reports that she does not drink alcohol and does not use drugs.     ALLERGIES   Review of patient's allergies indicates:   Allergen Reactions    Other omega-3s Nausea And Vomiting     chlorine    Rofecoxib Nausea And Vomiting and Nausea Only    Nsaids (non-steroidal anti-inflammatory drug) Nausea Only        MEDICATIONS  Current Outpatient Medications on File Prior to Visit   Medication Sig Dispense Refill    acetaminophen (TYLENOL) 325 MG tablet Take 2 tablets (650 mg total) by mouth every 6 (six) hours as needed. 13 tablet 0    aloe vera 25 mg Cap Take 1 capsule by mouth once daily. 30 capsule 11    aspirin (ECOTRIN) 81 MG EC tablet Take 1 tablet (81 mg total) by mouth once daily. 90 tablet 3    atorvastatin (LIPITOR) 20 MG tablet once a day      dextromethorphan-guaifenesin  mg (MUCINEX DM)  mg per 12 hr tablet Take 1 tablet by mouth every 12 (twelve) hours.      dicyclomine (BENTYL) 20 mg tablet Take by mouth.      FLUoxetine 20 MG capsule Take 1 capsule by mouth once daily in the morning 90 capsule 0    fluticasone (FLONASE) 50 mcg/actuation nasal spray 2 sprays by Each Nare route once daily.      gabapentin (NEURONTIN) 300 MG capsule twice a day      glucosamine/chondr gray A sod (OSTEO BI-FLEX ORAL) Take by mouth 2 (two) times daily.      hydroCHLOROthiazide (MICROZIDE) 12.5 mg capsule Take by mouth.      hydrOXYzine HCL (ATARAX) 25 MG tablet Take 1 tablet (25 mg total) by mouth nightly. 30 tablet 11    ibuprofen (ADVIL,MOTRIN) 600 MG tablet Take 600 mg by mouth every 6 (six) hours as needed.      meclizine (ANTIVERT) 25 mg tablet Take 1 tablet (25 mg total) by mouth 3 (three) times daily as needed. 90 tablet 0    meloxicam (MOBIC) 7.5 MG tablet Take 7.5 mg by mouth once daily.      montelukast (SINGULAIR) 10 mg tablet Take by mouth.      nitroGLYCERIN (NITROSTAT) 0.4 MG SL tablet Place 0.4 mg under the tongue.      tiZANidine  "(ZANAFLEX) 4 MG tablet Take by mouth.      traZODone (DESYREL) 150 MG tablet Take by mouth.      vitamin D (VITAMIN D3) 1000 units Tab Take by mouth.      metoprolol tartrate (LOPRESSOR) 25 MG tablet Take by mouth.      omeprazole (PRILOSEC) 40 MG capsule Take by mouth.       No current facility-administered medications on file prior to visit.          PHYSICAL EXAM   height is 5' 7" (1.702 m) and weight is 97.1 kg (214 lb). Her blood pressure is 139/82 and her pulse is 79. Her respiration is 18 and oxygen saturation is 99%.   Body mass index is 33.52 kg/m².      All other systems deferred.  GENERAL:  No acute distress  HABITUS: Obese  GAIT: Antalgic  SKIN: Normal  and No erythema, warmth, fluctuance     KNEE EXAM:    left:   Effusion: Minimal joint effusion  TTP: yes over Medial Joint Line, Lateral Joint Line, IT Band, and Pes Bursa also quad and TA  no crepitus with passive knee ROM  Passive ROM: Extension 0, Flexion 130  No pain with manipulation of patella  Stable to varus/valgus stress. No increased laxity to anterior/posterior drawer testing  positive Elier's test  No pain with IR/ER rotation of the hip  5/5 strength in knee flexion and extension, ankle plantarflexion and dorsiflexion  Neurovascular Status: Sensation intact to light touch in Sural, Saphenous, SPN, DPN, Tibial nerve distribution  2+ pulse DP/PT, normal capillary refill, foot has normal warmth    DATA:  Diagnostic tests reviewed for today's visit:     4v of the knee reveal Mild degenerative changes of the Medial compartment. There is evidence of advanced osteoarthritis changes with Osteophyte formation and Joint space narrowing. The limb is in netural alignment. The patella is tracking midline.  "

## 2022-10-10 ENCOUNTER — PATIENT MESSAGE (OUTPATIENT)
Dept: ADMINISTRATIVE | Facility: HOSPITAL | Age: 62
End: 2022-10-10
Payer: COMMERCIAL

## 2022-10-11 ENCOUNTER — TELEPHONE (OUTPATIENT)
Dept: PAIN MEDICINE | Facility: CLINIC | Age: 62
End: 2022-10-11
Payer: COMMERCIAL

## 2022-10-27 ENCOUNTER — OFFICE VISIT (OUTPATIENT)
Dept: ORTHOPEDICS | Facility: CLINIC | Age: 62
End: 2022-10-27
Payer: COMMERCIAL

## 2022-10-27 VITALS — WEIGHT: 205 LBS | BODY MASS INDEX: 32.18 KG/M2 | HEIGHT: 67 IN

## 2022-10-27 DIAGNOSIS — M70.50 PES ANSERINE BURSITIS: ICD-10-CM

## 2022-10-27 DIAGNOSIS — M17.0 PRIMARY OSTEOARTHRITIS OF BOTH KNEES: Primary | ICD-10-CM

## 2022-10-27 DIAGNOSIS — M60.9 MYOSITIS OF LEFT LOWER EXTREMITY, UNSPECIFIED MYOSITIS TYPE: ICD-10-CM

## 2022-10-27 PROCEDURE — 99999 PR PBB SHADOW E&M-EST. PATIENT-LVL IV: CPT | Mod: PBBFAC,,, | Performed by: ORTHOPAEDIC SURGERY

## 2022-10-27 PROCEDURE — 1159F MED LIST DOCD IN RCRD: CPT | Mod: CPTII,S$GLB,, | Performed by: ORTHOPAEDIC SURGERY

## 2022-10-27 PROCEDURE — 99999 PR PBB SHADOW E&M-EST. PATIENT-LVL IV: ICD-10-PCS | Mod: PBBFAC,,, | Performed by: ORTHOPAEDIC SURGERY

## 2022-10-27 PROCEDURE — 20610 DRAIN/INJ JOINT/BURSA W/O US: CPT | Mod: RT,S$GLB,, | Performed by: ORTHOPAEDIC SURGERY

## 2022-10-27 PROCEDURE — 20610 LARGE JOINT ASPIRATION/INJECTION: R KNEE: ICD-10-PCS | Mod: RT,S$GLB,, | Performed by: ORTHOPAEDIC SURGERY

## 2022-10-27 PROCEDURE — 99213 OFFICE O/P EST LOW 20 MIN: CPT | Mod: 25,S$GLB,, | Performed by: ORTHOPAEDIC SURGERY

## 2022-10-27 PROCEDURE — 99213 PR OFFICE/OUTPT VISIT, EST, LEVL III, 20-29 MIN: ICD-10-PCS | Mod: 25,S$GLB,, | Performed by: ORTHOPAEDIC SURGERY

## 2022-10-27 PROCEDURE — 1159F PR MEDICATION LIST DOCUMENTED IN MEDICAL RECORD: ICD-10-PCS | Mod: CPTII,S$GLB,, | Performed by: ORTHOPAEDIC SURGERY

## 2022-10-27 RX ORDER — TRIAMCINOLONE ACETONIDE 40 MG/ML
40 INJECTION, SUSPENSION INTRA-ARTICULAR; INTRAMUSCULAR
Status: DISCONTINUED | OUTPATIENT
Start: 2022-10-27 | End: 2022-10-27 | Stop reason: HOSPADM

## 2022-10-27 RX ADMIN — TRIAMCINOLONE ACETONIDE 40 MG: 40 INJECTION, SUSPENSION INTRA-ARTICULAR; INTRAMUSCULAR at 09:10

## 2022-10-27 NOTE — PROGRESS NOTES
Follow Up PATIENT ORTHOPAEDIC: Knee    PRIMARY CARE PHYSICIAN: Toshia Cintron MD   REFERRING PROVIDER: No referring provider defined for this encounter.     ASSESSMENT & PLAN:    Impression:  Left Knee Mild Osteoarthritis, Primary    Left Knee Pes Bursitis  Left Knee Myositis  Left Knee possible ACL tear    Follow Up Plan: PRN    Non operative care:    Alyce Zurita has physical exam evidence of above and wishes to pursue an non-operative care. I am recommending the following: intraarticular steroid injection.     To review: Patient comes in with a greater than 6 month history of left-sided knee pain.  She has seen other orthopedic in pain providers for both lumbar and knee related pain.  With regard to her knee she received intra-articular injection of steroid which did not provide significant relief to her.  Her pain is relatively diffuse she has tenderness down the anterior medial aspect of her tibia at the pes bursa at her medial joint line and extends proximally into her VMO and quadriceps muscle.  She does report some mechanical symptoms such as popping which are painful to her.  Some intermittent instability. Radiographs show some loose bodies as well as mild arthritis.  At this time it is hard to pinpoint this specific etiology or maximal point tenderness of her pain. I tried a medrol dose pack which did not provide significant relief. She continues with inflammation down in terms of her myositis, pes bursa and joint line pain. I would typically attempt MRI and consideration of visco injections or need for arthroscopsic intervention, however, she states he is unable to afford [facility] and these treatment modalities.  She does not tolerate anti-inflammatories by mouth well.  She is involved in physical therapy.  She is asking for more local providers to her outside of the [facility] system, I am recommending [facility].     The patient has been ordered:  Injection     CONSULTS:   None    ACTIVE  PROBLEM LIST  Patient Active Problem List   Diagnosis    Chronic neck pain    Primary insomnia    Obesity (BMI 30.0-34.9)    Carpal tunnel syndrome of left wrist    Wrist pain, chronic, left    Anxiety    Coronary artery disease involving native coronary artery of native heart without angina pectoris    Abnormal nuclear stress test    Chronic sinusitis    Cervical spondylosis    DDD (degenerative disc disease), cervical    Myofascial pain    Chronic pain of both knees    Right hand pain    Radiculopathy of leg    Dorsalgia, unspecified    Lumbar herniated disc    DDD (degenerative disc disease), lumbar    Sacroiliac joint pain    Facet arthropathy, lumbar    Foot pain, right    Primary osteoarthritis of both knees    Lumbar spondylosis    Unstable angina    Reflux    Polyp of colon    Overactive bladder    Osteoporosis    Internal hemorrhoids    Heart palpitations    GERD (gastroesophageal reflux disease)    Diverticular disease of colon    Atrophic vaginitis    Need for influenza vaccination    Change in mole    Lumbar radiculopathy    Chronic bilateral low back pain without sciatica    Weakness of trunk musculature    Decreased range of motion of lumbar spine    MOORE (dyspnea on exertion)    Chest pain, atypical           SUBJECTIVE    CHIEF COMPLAINT: Knee Pain    HPI:   Alyce Zurita is a 62 y.o. female here for evaluation and management of left knee pain. There is not a specific incident that brought about this pain. she has had progressive problems with the knee(s) starting 1 years ago but is now progressing to interfere with activities which include: walking, functional household ADL's, participating in family activities, and enjoying hobbies    Currently the pain in the joint is rated at moderate with activity. The pain is constant and is located in the knee, at level of joint line, located medially, located laterally, located anterior, and located posterior. The pain is described as aching, burning,  radiating, severe, stiffness, and throbbing. Relieving factors include rest, ice or heat, over the counter medication , and repositioning.     There is associated Catching.     Alyce Zurita has no additional complaints.     10/27/22: Here for follow up, no changes in pain. Medol Dosepack did not provide relief of bursitis or myositis pains. Now reporting feelings of instability.     PROGRESSIVE SYMPTOMS:  Pain impacting work  Pain worsened by weight bearing  Pain effecting living situation    FUNCTIONAL STATUS:   Climb a flight of stairs or walk up a hill     PREVIOUS TREATMENTS:  Medical: Intolerant of NSAIDS and Steroid Injections  Physical Therapy: Activities Modified   Previous Orthopaedic Surgery: None    REVIEW OF SYSTEMS:  PAIN ASSESSMENT:  See HPI.  MUSCULOSKELETAL: See HPI.  OTHER 10 point review of systems is negative except as stated in HPI above    PAST MEDICAL HISTORY   has a past medical history of Allergy, Anticoagulant long-term use, Anxiety (11/16/2018), Arthritis, Breast injury, Carpal tunnel syndrome of left wrist (9/10/2018), GERD (gastroesophageal reflux disease), Hyperlipemia, Hypertension, uncontrolled (8/17/2018), Interstitial cystitis, Kidney problem, Meningitis, Osteoporosis (3/20/2018), Polyp of rectum, PONV (postoperative nausea and vomiting), and Tachycardia.     PAST SURGICAL HISTORY   has a past surgical history that includes Appendectomy; Heart Procedure ; Kidney surgery; Hysterectomy (1993); Oophorectomy (Bilateral, 1993); Temporomandibular joint surgery; Cardiac surgery; Left heart catheterization (Left, 2/25/2019); Epidural steroid injection (Left, 1/8/2020); Epidural steroid injection (Left, 1/22/2020); Epidural steroid injection (Left, 2/12/2020); Epidural steroid injection (Bilateral, 8/5/2020); and Transforaminal epidural injection of steroid (Left, 8/11/2022).     FAMILY HISTORY  family history includes Arthritis in her mother; Asthma in her daughter; Breast cancer in her  maternal aunt, maternal aunt, maternal grandmother, mother, and paternal grandmother; Glaucoma in her mother; Melanoma in her father.     SOCIAL HISTORY   reports that she has never smoked. She has never used smokeless tobacco. She reports that she does not drink alcohol and does not use drugs.     ALLERGIES   Review of patient's allergies indicates:   Allergen Reactions    Other omega-3s Nausea And Vomiting     chlorine    Rofecoxib Nausea And Vomiting and Nausea Only    Nsaids (non-steroidal anti-inflammatory drug) Nausea Only        MEDICATIONS  Current Outpatient Medications on File Prior to Visit   Medication Sig Dispense Refill    acetaminophen (TYLENOL) 325 MG tablet Take 2 tablets (650 mg total) by mouth every 6 (six) hours as needed. 13 tablet 0    aloe vera 25 mg Cap Take 1 capsule by mouth once daily. 30 capsule 11    aspirin (ECOTRIN) 81 MG EC tablet Take 1 tablet (81 mg total) by mouth once daily. 90 tablet 3    atorvastatin (LIPITOR) 20 MG tablet once a day      dextromethorphan-guaifenesin  mg (MUCINEX DM)  mg per 12 hr tablet Take 1 tablet by mouth every 12 (twelve) hours.      dicyclomine (BENTYL) 20 mg tablet Take by mouth.      FLUoxetine 20 MG capsule Take 1 capsule by mouth once daily in the morning 90 capsule 0    fluticasone (FLONASE) 50 mcg/actuation nasal spray 2 sprays by Each Nare route once daily.      gabapentin (NEURONTIN) 300 MG capsule twice a day      glucosamine/chondr gray A sod (OSTEO BI-FLEX ORAL) Take by mouth 2 (two) times daily.      hydroCHLOROthiazide (MICROZIDE) 12.5 mg capsule Take by mouth.      hydrOXYzine HCL (ATARAX) 25 MG tablet Take 1 tablet (25 mg total) by mouth nightly. 30 tablet 11    ibuprofen (ADVIL,MOTRIN) 600 MG tablet Take 600 mg by mouth every 6 (six) hours as needed.      meclizine (ANTIVERT) 25 mg tablet Take 1 tablet (25 mg total) by mouth 3 (three) times daily as needed. 90 tablet 0    meloxicam (MOBIC) 7.5 MG tablet Take 7.5 mg by mouth once  "daily.      methylPREDNISolone (MEDROL DOSEPACK) 4 mg tablet use as directed 1 each 0    montelukast (SINGULAIR) 10 mg tablet Take by mouth.      nitroGLYCERIN (NITROSTAT) 0.4 MG SL tablet Place 0.4 mg under the tongue.      tiZANidine (ZANAFLEX) 4 MG tablet Take by mouth.      traZODone (DESYREL) 150 MG tablet Take by mouth.      vitamin D (VITAMIN D3) 1000 units Tab Take by mouth.      metoprolol tartrate (LOPRESSOR) 25 MG tablet Take by mouth.      omeprazole (PRILOSEC) 40 MG capsule Take by mouth.       No current facility-administered medications on file prior to visit.          PHYSICAL EXAM   height is 5' 7" (1.702 m) and weight is 93 kg (205 lb).   Body mass index is 32.11 kg/m².      All other systems deferred.  GENERAL:  No acute distress  HABITUS: Obese  GAIT: Antalgic  SKIN: Normal  and No erythema, warmth, fluctuance     KNEE EXAM:    left:   Effusion: Minimal joint effusion  TTP: yes over Medial Joint Line, Lateral Joint Line, IT Band, and Pes Bursa also quad and TA  no crepitus with passive knee ROM  Passive ROM: Extension 0, Flexion 130  No pain with manipulation of patella  Stable to varus/valgus stress. Subtle increased laxity to anterior drawer testing  positive Elier's test  No pain with IR/ER rotation of the hip  5/5 strength in knee flexion and extension, ankle plantarflexion and dorsiflexion  Neurovascular Status: Sensation intact to light touch in Sural, Saphenous, SPN, DPN, Tibial nerve distribution  2+ pulse DP/PT, normal capillary refill, foot has normal warmth    DATA:  Diagnostic tests reviewed for today's visit:     4v of the knee reveal Mild degenerative changes of the Medial compartment. There is evidence of advanced osteoarthritis changes with Osteophyte formation and Joint space narrowing. The limb is in netural alignment. The patella is tracking midline.    "

## 2022-10-27 NOTE — PROCEDURES
Large Joint Aspiration/Injection: R knee    Date/Time: 10/27/2022 9:40 AM  Performed by: Bk Pemberton MD  Authorized by: Bk Pemberton MD     Consent Done?:  Yes (Verbal)  Indications:  Arthritis and pain  Site marked: the procedure site was marked    Timeout: prior to procedure the correct patient, procedure, and site was verified    Prep: patient was prepped and draped in usual sterile fashion      Local anesthesia used?: Yes    Anesthesia:  Local infiltration  Local anesthetic:  Topical anesthetic and lidocaine 1% without epinephrine    Details:  Needle Size:  22 G  Approach:  Anterolateral  Location:  Knee  Site:  R knee  Medications:  40 mg triamcinolone acetonide 40 mg/mL  Patient tolerance:  Patient tolerated the procedure well with no immediate complications

## 2022-10-28 ENCOUNTER — TELEPHONE (OUTPATIENT)
Dept: CARDIOLOGY | Facility: HOSPITAL | Age: 62
End: 2022-10-28
Payer: COMMERCIAL

## 2022-10-28 DIAGNOSIS — I25.10 CORONARY ARTERY DISEASE INVOLVING NATIVE CORONARY ARTERY OF NATIVE HEART WITHOUT ANGINA PECTORIS: Primary | ICD-10-CM

## 2022-10-28 DIAGNOSIS — R07.89 CHEST PAIN, ATYPICAL: ICD-10-CM

## 2022-10-31 ENCOUNTER — TELEPHONE (OUTPATIENT)
Dept: CARDIOLOGY | Facility: HOSPITAL | Age: 62
End: 2022-10-31
Payer: COMMERCIAL

## 2023-01-09 ENCOUNTER — PATIENT MESSAGE (OUTPATIENT)
Dept: ADMINISTRATIVE | Facility: HOSPITAL | Age: 63
End: 2023-01-09
Payer: COMMERCIAL

## 2023-02-14 ENCOUNTER — PATIENT MESSAGE (OUTPATIENT)
Dept: ADMINISTRATIVE | Facility: HOSPITAL | Age: 63
End: 2023-02-14
Payer: COMMERCIAL

## 2023-02-14 ENCOUNTER — PATIENT OUTREACH (OUTPATIENT)
Dept: ADMINISTRATIVE | Facility: HOSPITAL | Age: 63
End: 2023-02-14
Payer: COMMERCIAL

## 2023-02-14 NOTE — PROGRESS NOTES
HM and immunization's reviewed and updated. Due for office visit.  No answer. Unable to LM. Sent [facility] message.

## 2023-08-10 NOTE — TELEPHONE ENCOUNTER
I called pt to scheduled caitlyn. Pt requests to cancel all upcoming appt for Dr. Armando as she cannot afford it. She declined having a caitlyn done.    Negative

## 2023-12-18 ENCOUNTER — TELEPHONE (OUTPATIENT)
Dept: RESEARCH | Facility: OTHER | Age: 63
End: 2023-12-18
Payer: COMMERCIAL

## 2023-12-18 NOTE — TELEPHONE ENCOUNTER
Study Title: Transcending COVID-19 barriers to pain care in [place]: Pragmatic comparative effectiveness trial of evidence-based, on-demand, digital behavioral treatments for chronic pain.  Sponsor:  [facility]   Study: [facility] Digital Pain Treatment Study - Transcending COVID-19 barriers to pain care in [place]: Pragmatic comparative effectiveness trial of evidence-based, on-demand, digital behavioral treatments for chronic pain.   IRB/Protocol #: 2021.177 - Phase 2?  Study#([facility]):  [id number]  Principle Investigator - Wil Hoffman   Sub-Investigator - Keven Ontiveros   Sponsor:  [facility] Screening Interest in Study Call    Attempt #: 1, 2, 3+: 1      1. Contact Made: []Yes [x]No   1A. If yes, contact date:   1B. If no, date of final contact attempt:   1C. If no, reason(s) contact not made:  []Wrong number []No response [x]Other   1D. If other, please specify: unable to reach subject by phone    2. Verbal commitment: []Yes  [x]No  2A. If yes, verbal commitment date:   2B. If no, reason: []Exclusion Criteria Met  []Desire not to participate []No reason provided [x]Other  2B1. If Type of Exclusion Criteria Met or other reason, specify: unable to reach subject by phone    I tried calling subject's number - it says that the call could not be completed. Please try your call again later. I sent  [employer] message to the subject. This is my first attempt to reach the subject.

## 2023-12-27 ENCOUNTER — TELEPHONE (OUTPATIENT)
Dept: RESEARCH | Facility: OTHER | Age: 63
End: 2023-12-27
Payer: COMMERCIAL

## 2023-12-27 NOTE — TELEPHONE ENCOUNTER
"Study Title: Transcending COVID-19 barriers to pain care in [place]: Pragmatic comparative effectiveness trial of evidence-based, on-demand, digital behavioral treatments for chronic pain.  Sponsor:  [facility]   Study: [facility] Digital Pain Treatment Study - Transcending COVID-19 barriers to pain care in [place]: Pragmatic comparative effectiveness trial of evidence-based, on-demand, digital behavioral treatments for chronic pain.   IRB/Protocol #: 2021.177 - Phase 2?  Study#([facility]):  [id number]  Principle Investigator - Wil Hoffman   Sub-Investigator - Keven Ontiveros   Sponsor[facility]  [facility] Scre[facility] Interest in Study Call    Attempt #: 1, 2, 3+: 2      1. Contact Made: []Yes [x]No   1A. If yes, contact date:   1B. If no, date of final contact attempt:   1C. If no, reason(s) contact not made:  []Wrong number []No response [x]Other   1D. If other, please specify: call could not be completed    2. Verbal commitment: []Yes  [x]No  2A. If yes, verbal commitment date:   2B. If no, reason: []Exclusion Criteria Met  []Desire not to participate []No reason provided [x]Other  2B1. If Type of Exclusion Criteria Met or other reason, specify: call could not be completed     I attempted to call the subject. The phone just rings & then it says, "Call could not be completed. Please hang up & try your call again later." [employer] message has not been checked. Last sign into [employer] was 2022. This is my second attempt to reach this subject.               "

## 2024-01-02 ENCOUNTER — TELEPHONE (OUTPATIENT)
Dept: RESEARCH | Facility: OTHER | Age: 64
End: 2024-01-02

## 2024-01-02 NOTE — TELEPHONE ENCOUNTER
"Study Title: Transcending COVID-19 barriers to pain care in rural [place]: Pragmatic comparative effectiveness trial of evidence-based, on-demand, digital behavioral treatments for chronic pain.  Sponsor:  [facility]   Study: [facility] Digital Pain Treatment Study - Transcending COVID-19 barriers to pain care in [place]: Pragmatic comparative effectiveness trial of evidence-based, on-demand, digital behavioral treatments for chronic pain.   IRB/Protocol #: 2021.177 - Phase 2?  Study#([facility]):  [id number]  Principle Investigator - Wil Hoffman   Sub-Investigator - Keven Ontiveros   Sponsor:  [facility] Screening Interest in Study Call    Attempt #: 1, 2, 3+: 3+      1. Contact Made: []Yes [x]No   1A. If yes, contact date:   1B. If no, date of final contact attempt: [id number]  1C. If no, reason(s) contact not made:  []Wrong number [x]No response [x]Other   1D. If other, please specify: Call could not be completed    2. Verbal commitment: []Yes  [x]No  2A. If yes, verbal commitment date:   2B. If no, reason: []Exclusion Criteria Met  []Desire not to participate []No reason provided [x]Other  2B1. If Type of Exclusion Criteria Met or other reason, specify: Call could not be completed    I attempted to call this subject. The line just rings & then it says, "Your call can not be completed. Please hang up & try your call again later." Subject has not read the [employer] messages that had been sent. This was my thord & final call to this subject.              "

## 2025-05-17 NOTE — TELEPHONE ENCOUNTER
Canceled    Please try to make your counseling appointments weekly, if possible.  Discussed your new medication with your therapist and primary care provider.  Because most therapist cannot prescribe medications, we have placed a referral for you to psychiatry, since they can be of additional help, and can prescribe or adjust medications if necessary.  Please use the contact information provided here to schedule a visit.

## (undated) DEVICE — WIRE GUIDE SAFE-T-J .035 260CM

## (undated) DEVICE — CATH JACKY RADIAL 3.5 110CM

## (undated) DEVICE — KIT SYR REUSABLE

## (undated) DEVICE — ELECTRODE EDGE SYSTEM RTS

## (undated) DEVICE — OMNIPAQUE 350MG 150ML VIAL

## (undated) DEVICE — KIT HAND CONTROL HIGH PRESSUR

## (undated) DEVICE — KIT MANIFOLD LOW PRESS TUBING

## (undated) DEVICE — HEMOSTAT VASC BAND REG 24CM

## (undated) DEVICE — KIT GLIDESHEATH SLEND 6FR 10CM

## (undated) DEVICE — PACK CATH LAB